# Patient Record
Sex: FEMALE | Race: WHITE | NOT HISPANIC OR LATINO | Employment: UNEMPLOYED | URBAN - NONMETROPOLITAN AREA
[De-identification: names, ages, dates, MRNs, and addresses within clinical notes are randomized per-mention and may not be internally consistent; named-entity substitution may affect disease eponyms.]

---

## 2021-02-03 ENCOUNTER — APPOINTMENT (EMERGENCY)
Dept: CT IMAGING | Facility: HOSPITAL | Age: 51
DRG: 872 | End: 2021-02-03
Payer: COMMERCIAL

## 2021-02-03 ENCOUNTER — HOSPITAL ENCOUNTER (OUTPATIENT)
Facility: HOSPITAL | Age: 51
Setting detail: OBSERVATION
Discharge: LEFT BEFORE DISCHARGE | DRG: 872 | End: 2021-02-04
Attending: EMERGENCY MEDICINE | Admitting: FAMILY MEDICINE
Payer: COMMERCIAL

## 2021-02-03 DIAGNOSIS — Z72.0 TOBACCO ABUSE: ICD-10-CM

## 2021-02-03 DIAGNOSIS — R52 INTRACTABLE PAIN: ICD-10-CM

## 2021-02-03 DIAGNOSIS — R11.0 NAUSEA: ICD-10-CM

## 2021-02-03 DIAGNOSIS — R10.9 LEFT FLANK PAIN: Primary | ICD-10-CM

## 2021-02-03 DIAGNOSIS — D72.829 LEUKOCYTOSIS: ICD-10-CM

## 2021-02-03 DIAGNOSIS — N20.0 NEPHROLITHIASIS: ICD-10-CM

## 2021-02-03 PROBLEM — R65.10 SIRS (SYSTEMIC INFLAMMATORY RESPONSE SYNDROME) (HCC): Status: ACTIVE | Noted: 2021-02-03

## 2021-02-03 PROBLEM — E87.2 INCREASED ANION GAP METABOLIC ACIDOSIS: Status: ACTIVE | Noted: 2021-02-03

## 2021-02-03 PROBLEM — E87.29 INCREASED ANION GAP METABOLIC ACIDOSIS: Status: ACTIVE | Noted: 2021-02-03

## 2021-02-03 PROBLEM — R73.9 HYPERGLYCEMIA: Status: ACTIVE | Noted: 2021-02-03

## 2021-02-03 LAB
ALBUMIN SERPL BCP-MCNC: 3.5 G/DL (ref 3.5–5)
ALP SERPL-CCNC: 116 U/L (ref 46–116)
ALT SERPL W P-5'-P-CCNC: 35 U/L (ref 12–78)
ANION GAP SERPL CALCULATED.3IONS-SCNC: 14 MMOL/L (ref 4–13)
AST SERPL W P-5'-P-CCNC: 18 U/L (ref 5–45)
BACTERIA UR QL AUTO: NORMAL /HPF
BASOPHILS # BLD AUTO: 0.08 THOUSANDS/ΜL (ref 0–0.1)
BASOPHILS NFR BLD AUTO: 0 % (ref 0–1)
BILIRUB SERPL-MCNC: 0.94 MG/DL (ref 0.2–1)
BILIRUB UR QL STRIP: NEGATIVE
BUN SERPL-MCNC: 9 MG/DL (ref 5–25)
CALCIUM SERPL-MCNC: 9 MG/DL (ref 8.3–10.1)
CHLORIDE SERPL-SCNC: 100 MMOL/L (ref 100–108)
CHOLEST SERPL-MCNC: 248 MG/DL (ref 50–200)
CK SERPL-CCNC: 58 U/L (ref 26–192)
CLARITY UR: CLEAR
CO2 SERPL-SCNC: 21 MMOL/L (ref 21–32)
COLOR UR: YELLOW
CREAT SERPL-MCNC: 0.98 MG/DL (ref 0.6–1.3)
EOSINOPHIL # BLD AUTO: 0.01 THOUSAND/ΜL (ref 0–0.61)
EOSINOPHIL NFR BLD AUTO: 0 % (ref 0–6)
ERYTHROCYTE [DISTWIDTH] IN BLOOD BY AUTOMATED COUNT: 12.6 % (ref 11.6–15.1)
EST. AVERAGE GLUCOSE BLD GHB EST-MCNC: 174 MG/DL
EXT PREG TEST URINE: NEGATIVE
EXT. CONTROL ED NAV: NORMAL
GFR SERPL CREATININE-BSD FRML MDRD: 68 ML/MIN/1.73SQ M
GLUCOSE SERPL-MCNC: 240 MG/DL (ref 65–140)
GLUCOSE SERPL-MCNC: 264 MG/DL (ref 65–140)
GLUCOSE SERPL-MCNC: 267 MG/DL (ref 65–140)
GLUCOSE UR STRIP-MCNC: ABNORMAL MG/DL
HBA1C MFR BLD: 7.7 %
HCT VFR BLD AUTO: 47.7 % (ref 34.8–46.1)
HDLC SERPL-MCNC: 34 MG/DL
HGB BLD-MCNC: 16.7 G/DL (ref 11.5–15.4)
HGB UR QL STRIP.AUTO: ABNORMAL
IMM GRANULOCYTES # BLD AUTO: 0.09 THOUSAND/UL (ref 0–0.2)
IMM GRANULOCYTES NFR BLD AUTO: 1 % (ref 0–2)
INR PPP: 0.97 (ref 0.84–1.19)
KETONES UR STRIP-MCNC: NEGATIVE MG/DL
LACTATE SERPL-SCNC: 2 MMOL/L (ref 0.5–2)
LACTATE SERPL-SCNC: 2.2 MMOL/L (ref 0.5–2)
LACTATE SERPL-SCNC: 3.1 MMOL/L (ref 0.5–2)
LDLC SERPL CALC-MCNC: 163 MG/DL (ref 0–100)
LEUKOCYTE ESTERASE UR QL STRIP: ABNORMAL
LIPASE SERPL-CCNC: 35 U/L (ref 73–393)
LYMPHOCYTES # BLD AUTO: 0.86 THOUSANDS/ΜL (ref 0.6–4.47)
LYMPHOCYTES NFR BLD AUTO: 5 % (ref 14–44)
MAGNESIUM SERPL-MCNC: 1.6 MG/DL (ref 1.6–2.6)
MCH RBC QN AUTO: 31.7 PG (ref 26.8–34.3)
MCHC RBC AUTO-ENTMCNC: 35 G/DL (ref 31.4–37.4)
MCV RBC AUTO: 91 FL (ref 82–98)
MONOCYTES # BLD AUTO: 0.51 THOUSAND/ΜL (ref 0.17–1.22)
MONOCYTES NFR BLD AUTO: 3 % (ref 4–12)
NEUTROPHILS # BLD AUTO: 16.28 THOUSANDS/ΜL (ref 1.85–7.62)
NEUTS SEG NFR BLD AUTO: 91 % (ref 43–75)
NITRITE UR QL STRIP: NEGATIVE
NON-SQ EPI CELLS URNS QL MICRO: NORMAL /HPF
NONHDLC SERPL-MCNC: 214 MG/DL
NRBC BLD AUTO-RTO: 0 /100 WBCS
PH UR STRIP.AUTO: 6 [PH]
PLATELET # BLD AUTO: 192 THOUSANDS/UL (ref 149–390)
PMV BLD AUTO: 8.8 FL (ref 8.9–12.7)
POTASSIUM SERPL-SCNC: 3.9 MMOL/L (ref 3.5–5.3)
PROT SERPL-MCNC: 8 G/DL (ref 6.4–8.2)
PROT UR STRIP-MCNC: NEGATIVE MG/DL
PROTHROMBIN TIME: 12.7 SECONDS (ref 11.6–14.5)
RBC # BLD AUTO: 5.26 MILLION/UL (ref 3.81–5.12)
RBC #/AREA URNS AUTO: NORMAL /HPF
SODIUM SERPL-SCNC: 135 MMOL/L (ref 136–145)
SP GR UR STRIP.AUTO: <=1.005 (ref 1–1.03)
TRIGL SERPL-MCNC: 253 MG/DL
UROBILINOGEN UR QL STRIP.AUTO: 0.2 E.U./DL
WBC # BLD AUTO: 17.83 THOUSAND/UL (ref 4.31–10.16)
WBC #/AREA URNS AUTO: NORMAL /HPF

## 2021-02-03 PROCEDURE — 36415 COLL VENOUS BLD VENIPUNCTURE: CPT | Performed by: EMERGENCY MEDICINE

## 2021-02-03 PROCEDURE — 85610 PROTHROMBIN TIME: CPT | Performed by: EMERGENCY MEDICINE

## 2021-02-03 PROCEDURE — 81001 URINALYSIS AUTO W/SCOPE: CPT | Performed by: EMERGENCY MEDICINE

## 2021-02-03 PROCEDURE — 82550 ASSAY OF CK (CPK): CPT | Performed by: EMERGENCY MEDICINE

## 2021-02-03 PROCEDURE — 87086 URINE CULTURE/COLONY COUNT: CPT | Performed by: NURSE PRACTITIONER

## 2021-02-03 PROCEDURE — 84145 PROCALCITONIN (PCT): CPT | Performed by: NURSE PRACTITIONER

## 2021-02-03 PROCEDURE — NC001 PR NO CHARGE

## 2021-02-03 PROCEDURE — 87077 CULTURE AEROBIC IDENTIFY: CPT | Performed by: NURSE PRACTITIONER

## 2021-02-03 PROCEDURE — 74176 CT ABD & PELVIS W/O CONTRAST: CPT

## 2021-02-03 PROCEDURE — 87186 SC STD MICRODIL/AGAR DIL: CPT | Performed by: NURSE PRACTITIONER

## 2021-02-03 PROCEDURE — 83690 ASSAY OF LIPASE: CPT | Performed by: EMERGENCY MEDICINE

## 2021-02-03 PROCEDURE — 96374 THER/PROPH/DIAG INJ IV PUSH: CPT

## 2021-02-03 PROCEDURE — 83735 ASSAY OF MAGNESIUM: CPT | Performed by: EMERGENCY MEDICINE

## 2021-02-03 PROCEDURE — 82360 CALCULUS ASSAY QUANT: CPT | Performed by: FAMILY MEDICINE

## 2021-02-03 PROCEDURE — 99285 EMERGENCY DEPT VISIT HI MDM: CPT

## 2021-02-03 PROCEDURE — G1004 CDSM NDSC: HCPCS

## 2021-02-03 PROCEDURE — 99204 OFFICE O/P NEW MOD 45 MIN: CPT

## 2021-02-03 PROCEDURE — 99220 PR INITIAL OBSERVATION CARE/DAY 70 MINUTES: CPT | Performed by: FAMILY MEDICINE

## 2021-02-03 PROCEDURE — 80053 COMPREHEN METABOLIC PANEL: CPT | Performed by: EMERGENCY MEDICINE

## 2021-02-03 PROCEDURE — 80061 LIPID PANEL: CPT | Performed by: FAMILY MEDICINE

## 2021-02-03 PROCEDURE — 96375 TX/PRO/DX INJ NEW DRUG ADDON: CPT

## 2021-02-03 PROCEDURE — 83605 ASSAY OF LACTIC ACID: CPT | Performed by: FAMILY MEDICINE

## 2021-02-03 PROCEDURE — 85025 COMPLETE CBC W/AUTO DIFF WBC: CPT | Performed by: EMERGENCY MEDICINE

## 2021-02-03 PROCEDURE — 83605 ASSAY OF LACTIC ACID: CPT | Performed by: EMERGENCY MEDICINE

## 2021-02-03 PROCEDURE — 99285 EMERGENCY DEPT VISIT HI MDM: CPT | Performed by: EMERGENCY MEDICINE

## 2021-02-03 PROCEDURE — 82948 REAGENT STRIP/BLOOD GLUCOSE: CPT

## 2021-02-03 PROCEDURE — 83036 HEMOGLOBIN GLYCOSYLATED A1C: CPT | Performed by: FAMILY MEDICINE

## 2021-02-03 PROCEDURE — 81025 URINE PREGNANCY TEST: CPT | Performed by: EMERGENCY MEDICINE

## 2021-02-03 PROCEDURE — 87040 BLOOD CULTURE FOR BACTERIA: CPT | Performed by: NURSE PRACTITIONER

## 2021-02-03 RX ORDER — ONDANSETRON 2 MG/ML
4 INJECTION INTRAMUSCULAR; INTRAVENOUS ONCE
Status: COMPLETED | OUTPATIENT
Start: 2021-02-03 | End: 2021-02-03

## 2021-02-03 RX ORDER — NICOTINE 21 MG/24HR
1 PATCH, TRANSDERMAL 24 HOURS TRANSDERMAL DAILY
Status: DISCONTINUED | OUTPATIENT
Start: 2021-02-03 | End: 2021-02-04 | Stop reason: HOSPADM

## 2021-02-03 RX ORDER — IBUPROFEN 600 MG/1
600 TABLET ORAL EVERY 6 HOURS PRN
COMMUNITY
End: 2021-04-27 | Stop reason: HOSPADM

## 2021-02-03 RX ORDER — ONDANSETRON 2 MG/ML
4 INJECTION INTRAMUSCULAR; INTRAVENOUS EVERY 6 HOURS PRN
Status: DISCONTINUED | OUTPATIENT
Start: 2021-02-03 | End: 2021-02-04 | Stop reason: HOSPADM

## 2021-02-03 RX ORDER — OXYCODONE HYDROCHLORIDE AND ACETAMINOPHEN 5; 325 MG/1; MG/1
1 TABLET ORAL EVERY 4 HOURS PRN
Status: DISCONTINUED | OUTPATIENT
Start: 2021-02-03 | End: 2021-02-04 | Stop reason: HOSPADM

## 2021-02-03 RX ORDER — MORPHINE SULFATE 4 MG/ML
4 INJECTION, SOLUTION INTRAMUSCULAR; INTRAVENOUS ONCE
Status: COMPLETED | OUTPATIENT
Start: 2021-02-03 | End: 2021-02-03

## 2021-02-03 RX ORDER — KETOROLAC TROMETHAMINE 30 MG/ML
15 INJECTION, SOLUTION INTRAMUSCULAR; INTRAVENOUS EVERY 6 HOURS PRN
Status: DISCONTINUED | OUTPATIENT
Start: 2021-02-03 | End: 2021-02-04 | Stop reason: HOSPADM

## 2021-02-03 RX ORDER — TAMSULOSIN HYDROCHLORIDE 0.4 MG/1
0.4 CAPSULE ORAL
Status: DISCONTINUED | OUTPATIENT
Start: 2021-02-04 | End: 2021-02-04 | Stop reason: HOSPADM

## 2021-02-03 RX ORDER — SODIUM CHLORIDE 9 MG/ML
125 INJECTION, SOLUTION INTRAVENOUS CONTINUOUS
Status: DISCONTINUED | OUTPATIENT
Start: 2021-02-03 | End: 2021-02-04 | Stop reason: HOSPADM

## 2021-02-03 RX ORDER — SODIUM CHLORIDE 9 MG/ML
125 INJECTION, SOLUTION INTRAVENOUS CONTINUOUS
Status: DISCONTINUED | OUTPATIENT
Start: 2021-02-03 | End: 2021-02-03

## 2021-02-03 RX ORDER — ACETAMINOPHEN 325 MG/1
650 TABLET ORAL EVERY 6 HOURS PRN
Status: DISCONTINUED | OUTPATIENT
Start: 2021-02-03 | End: 2021-02-04 | Stop reason: HOSPADM

## 2021-02-03 RX ORDER — DOCUSATE SODIUM 100 MG/1
100 CAPSULE, LIQUID FILLED ORAL 2 TIMES DAILY
Status: DISCONTINUED | OUTPATIENT
Start: 2021-02-03 | End: 2021-02-04 | Stop reason: HOSPADM

## 2021-02-03 RX ORDER — SULFAMETHOXAZOLE AND TRIMETHOPRIM 800; 160 MG/1; MG/1
1 TABLET ORAL ONCE
Status: COMPLETED | OUTPATIENT
Start: 2021-02-03 | End: 2021-02-03

## 2021-02-03 RX ORDER — KETOROLAC TROMETHAMINE 30 MG/ML
15 INJECTION, SOLUTION INTRAMUSCULAR; INTRAVENOUS ONCE
Status: COMPLETED | OUTPATIENT
Start: 2021-02-03 | End: 2021-02-03

## 2021-02-03 RX ORDER — TAMSULOSIN HYDROCHLORIDE 0.4 MG/1
0.4 CAPSULE ORAL ONCE
Status: COMPLETED | OUTPATIENT
Start: 2021-02-03 | End: 2021-02-03

## 2021-02-03 RX ORDER — SODIUM CHLORIDE 9 MG/ML
200 INJECTION, SOLUTION INTRAVENOUS CONTINUOUS
Status: DISCONTINUED | OUTPATIENT
Start: 2021-02-03 | End: 2021-02-03

## 2021-02-03 RX ORDER — CIPROFLOXACIN 2 MG/ML
400 INJECTION, SOLUTION INTRAVENOUS EVERY 12 HOURS
Status: DISCONTINUED | OUTPATIENT
Start: 2021-02-03 | End: 2021-02-04 | Stop reason: HOSPADM

## 2021-02-03 RX ADMIN — OXYCODONE HYDROCHLORIDE AND ACETAMINOPHEN 1 TABLET: 5; 325 TABLET ORAL at 17:48

## 2021-02-03 RX ADMIN — ONDANSETRON 4 MG: 2 INJECTION INTRAMUSCULAR; INTRAVENOUS at 14:40

## 2021-02-03 RX ADMIN — SULFAMETHOXAZOLE AND TRIMETHOPRIM 1 TABLET: 800; 160 TABLET ORAL at 11:06

## 2021-02-03 RX ADMIN — KETOROLAC TROMETHAMINE 15 MG: 30 INJECTION, SOLUTION INTRAMUSCULAR at 19:57

## 2021-02-03 RX ADMIN — INSULIN LISPRO 3 UNITS: 100 INJECTION, SOLUTION INTRAVENOUS; SUBCUTANEOUS at 22:38

## 2021-02-03 RX ADMIN — MORPHINE SULFATE 4 MG: 4 INJECTION INTRAVENOUS at 10:42

## 2021-02-03 RX ADMIN — DOCUSATE SODIUM 100 MG: 100 CAPSULE, LIQUID FILLED ORAL at 13:30

## 2021-02-03 RX ADMIN — SODIUM CHLORIDE 125 ML/HR: 0.9 INJECTION, SOLUTION INTRAVENOUS at 17:41

## 2021-02-03 RX ADMIN — KETOROLAC TROMETHAMINE 15 MG: 30 INJECTION, SOLUTION INTRAMUSCULAR at 11:18

## 2021-02-03 RX ADMIN — CIPROFLOXACIN 400 MG: 2 INJECTION, SOLUTION INTRAVENOUS at 13:28

## 2021-02-03 RX ADMIN — ENOXAPARIN SODIUM 40 MG: 40 INJECTION SUBCUTANEOUS at 13:30

## 2021-02-03 RX ADMIN — TAMSULOSIN HYDROCHLORIDE 0.4 MG: 0.4 CAPSULE ORAL at 11:06

## 2021-02-03 RX ADMIN — ONDANSETRON 4 MG: 2 INJECTION INTRAMUSCULAR; INTRAVENOUS at 10:40

## 2021-02-03 RX ADMIN — SODIUM CHLORIDE 125 ML/HR: 0.9 INJECTION, SOLUTION INTRAVENOUS at 11:44

## 2021-02-03 NOTE — ED PROVIDER NOTES
History  Chief Complaint   Patient presents with    Flank Pain     left flank pain that started yesterday  79-year-old female with a past medical history of kidney stones, reflux esophagitis, diaphragmatic hernia status post appendectomy presents with left flank pain associated nausea that started last night  Patient states she had a kidney stone many years ago which felt similar  Patient has taken Tylenol without improvement of symptoms  Flank pain radiates to left low back, left lower quadrant and groin  Patient states that she had urinary urgency yesterday which resolved spontaneously  Patient denies history of pyelonephritis, diverticulitis, AAA or hernia  Patient does not follow with urologist   Review of medical chart shows no recent admissions or hospitalizations  Patient denies fever, chills, headache, neck stiffness, sore throat, cough, sputum production, vomiting, chest pain, shortness of breath, rash, vaginal bleeding or discharge, focal motor or sensory deficits, lower extremity swelling or pain, diarrhea, bloody stools or constipation   is at bedside  Dr Taurus Nowak wore PPE during clinical evaluation due to COVID-19 pandemic including bonnet, eye goggles, face mask, gown and gloves            History provided by:  Patient, medical records and spouse   used: No    Flank Pain  Pain location:  L flank  Pain quality: sharp, shooting and stabbing    Pain radiates to:  L flank and LLQ  Pain severity:  Severe  Onset quality:  Sudden  Duration:  12 hours  Timing:  Constant  Progression:  Worsening  Chronicity:  New  Context: awakening from sleep    Context: not alcohol use, not diet changes, not eating, not laxative use, not medication withdrawal, not previous surgeries, not recent illness, not recent sexual activity, not recent travel, not retching, not sick contacts, not suspicious food intake and not trauma    Relieved by:  Nothing  Worsened by:  Nothing  Ineffective treatments:  Acetaminophen  Associated symptoms: nausea    Associated symptoms: no anorexia, no belching, no chest pain, no chills, no constipation, no cough, no diarrhea, no dysuria, no fatigue, no fever, no flatus, no hematemesis, no hematochezia, no hematuria, no melena, no shortness of breath, no sore throat, no vaginal bleeding, no vaginal discharge and no vomiting    Risk factors: obesity    Risk factors: no alcohol abuse, no aspirin use, not elderly, has not had multiple surgeries, no NSAID use, not pregnant and no recent hospitalization        Prior to Admission Medications   Prescriptions Last Dose Informant Patient Reported? Taking?   ibuprofen (MOTRIN) 600 mg tablet 2/3/2021 at Unknown time Self Yes Yes   Sig: Take 600 mg by mouth every 6 (six) hours as needed for mild pain or headaches      Facility-Administered Medications: None       Past Medical History:   Diagnosis Date    High cholesterol        Past Surgical History:   Procedure Laterality Date    APPENDECTOMY       SECTION      DILATION AND CURETTAGE OF UTERUS         History reviewed  No pertinent family history  I have reviewed and agree with the history as documented  E-Cigarette/Vaping    E-Cigarette Use Never User      E-Cigarette/Vaping Substances     Social History     Tobacco Use    Smoking status: Current Every Day Smoker     Packs/day: 1 00    Smokeless tobacco: Never Used   Substance Use Topics    Alcohol use: Never     Frequency: Never    Drug use: Never       Review of Systems   Constitutional: Negative for chills, diaphoresis, fatigue and fever  HENT: Negative for congestion, drooling, facial swelling, nosebleeds, sneezing, sore throat, trouble swallowing and voice change  Eyes: Negative for photophobia, pain and visual disturbance  Respiratory: Negative for cough, chest tightness, shortness of breath and wheezing  Cardiovascular: Negative for chest pain, palpitations and leg swelling  Gastrointestinal: Positive for abdominal pain and nausea  Negative for anal bleeding, anorexia, blood in stool, constipation, diarrhea, flatus, hematemesis, hematochezia, melena, rectal pain and vomiting  Genitourinary: Positive for flank pain and urgency (Resolved)  Negative for decreased urine volume, difficulty urinating, dysuria, frequency, hematuria, pelvic pain, vaginal bleeding, vaginal discharge and vaginal pain  Musculoskeletal: Negative for arthralgias, back pain, gait problem, joint swelling, myalgias, neck pain and neck stiffness  Skin: Negative for color change, pallor, rash and wound  Allergic/Immunologic: Negative for immunocompromised state  Neurological: Negative for dizziness, tremors, seizures, syncope, facial asymmetry, speech difficulty, weakness, light-headedness, numbness and headaches  Hematological: Negative for adenopathy  Psychiatric/Behavioral: Negative for agitation, confusion, hallucinations and suicidal ideas  The patient is not nervous/anxious  Physical Exam  Physical Exam  Vitals signs and nursing note reviewed  Exam conducted with a chaperone present  Constitutional:       General: She is not in acute distress  Appearance: Normal appearance  She is well-developed and normal weight  She is not ill-appearing, toxic-appearing or diaphoretic  HENT:      Head: Normocephalic and atraumatic  Jaw: There is normal jaw occlusion  Right Ear: Hearing, tympanic membrane, ear canal and external ear normal  There is no impacted cerumen  No mastoid tenderness  No hemotympanum  Left Ear: Hearing, tympanic membrane, ear canal and external ear normal  There is no impacted cerumen  No mastoid tenderness  No hemotympanum  Nose: Nose normal       Right Nostril: No epistaxis  Left Nostril: No epistaxis  Right Sinus: No maxillary sinus tenderness or frontal sinus tenderness        Left Sinus: No maxillary sinus tenderness or frontal sinus tenderness  Mouth/Throat:      Lips: Pink  No lesions  Mouth: Mucous membranes are moist  No lacerations or angioedema  Tongue: No lesions  Tongue does not deviate from midline  Palate: No mass and lesions  Pharynx: Oropharynx is clear  Uvula midline  No pharyngeal swelling, oropharyngeal exudate, posterior oropharyngeal erythema or uvula swelling  Tonsils: No tonsillar exudate or tonsillar abscesses  Eyes:      General: Lids are normal  Vision grossly intact  Gaze aligned appropriately  No visual field deficit or scleral icterus  Right eye: No discharge  Left eye: No discharge  Extraocular Movements: Extraocular movements intact  Right eye: No nystagmus  Left eye: No nystagmus  Conjunctiva/sclera: Conjunctivae normal       Right eye: Right conjunctiva is not injected  Left eye: Left conjunctiva is not injected  Pupils: Pupils are equal, round, and reactive to light  Neck:      Musculoskeletal: Full passive range of motion without pain, normal range of motion and neck supple  No neck rigidity, spinous process tenderness or muscular tenderness  Thyroid: No thyroid mass or thyromegaly  Trachea: Trachea and phonation normal    Cardiovascular:      Rate and Rhythm: Normal rate and regular rhythm  Pulses: Normal pulses  Radial pulses are 2+ on the right side and 2+ on the left side  Dorsalis pedis pulses are 2+ on the right side and 2+ on the left side  Heart sounds: Normal heart sounds, S1 normal and S2 normal    Pulmonary:      Effort: Pulmonary effort is normal  No tachypnea, accessory muscle usage, respiratory distress or retractions  Breath sounds: Normal breath sounds and air entry  No stridor or decreased air movement  No decreased breath sounds, wheezing, rhonchi or rales  Chest:      Chest wall: No tenderness  Abdominal:      General: Abdomen is flat   Bowel sounds are normal  There is no distension  Palpations: Abdomen is soft  Abdomen is not rigid  There is no mass  Tenderness: There is abdominal tenderness in the left lower quadrant  There is left CVA tenderness  There is no right CVA tenderness, guarding or rebound  Negative signs include Francis's sign and McBurney's sign  Hernia: No hernia is present  There is no hernia in the umbilical area, ventral area, left inguinal area or left femoral area  Musculoskeletal: Normal range of motion  General: No swelling, tenderness, deformity or signs of injury  Right lower leg: No edema  Left lower leg: No edema  Lymphadenopathy:      Cervical: No cervical adenopathy  Skin:     General: Skin is warm and dry  Capillary Refill: Capillary refill takes less than 2 seconds  Coloration: Skin is not ashen, cyanotic, jaundiced, mottled, pale or sallow  Findings: No abrasion, abscess, acne, bruising, burn, ecchymosis, erythema, signs of injury, laceration, lesion, petechiae, rash or wound  Rash is not macular or papular  Comments: No overlying skin discoloration, erythema or rash on left CVA, left flank or left lower quadrant  Neurological:      General: No focal deficit present  Mental Status: She is alert and oriented to person, place, and time  Mental status is at baseline  She is not disoriented  GCS: GCS eye subscore is 4  GCS verbal subscore is 5  GCS motor subscore is 6  Cranial Nerves: Cranial nerves are intact  No cranial nerve deficit, dysarthria or facial asymmetry  Sensory: Sensation is intact  No sensory deficit  Motor: Motor function is intact  No weakness, tremor, atrophy, abnormal muscle tone or seizure activity  Coordination: Coordination is intact  Coordination normal       Gait: Gait is intact   Gait normal       Comments: Patient is AAOx4, GCS 15; speaking clearly and appropriately; motor and sensation intact; visual fields intact; cranial nerves II-XII grossly intact; no facial droop, slurred speech or arm drift   Psychiatric:         Attention and Perception: Attention and perception normal  She is attentive  Mood and Affect: Mood and affect normal          Speech: Speech normal          Behavior: Behavior normal  Behavior is cooperative  Thought Content:  Thought content normal          Cognition and Memory: Cognition and memory normal          Judgment: Judgment normal          Vital Signs  ED Triage Vitals   Temperature Pulse Respirations Blood Pressure SpO2   02/03/21 0932 02/03/21 0932 02/03/21 1235 02/03/21 0932 02/03/21 0932   98 9 °F (37 2 °C) (!) 127 18 145/84 97 %      Temp Source Heart Rate Source Patient Position - Orthostatic VS BP Location FiO2 (%)   02/03/21 0932 02/03/21 0932 02/03/21 1235 02/03/21 0932 --   Temporal Monitor Lying Left arm       Pain Score       02/03/21 1042       Worst Possible Pain           Vitals:    02/04/21 0414 02/04/21 0608 02/04/21 0658 02/04/21 0826   BP:   119/55    Pulse: (!) 124 (!) 123 (!) 126 (!) 126   Patient Position - Orthostatic VS:             Visual Acuity      ED Medications  Medications   ondansetron (ZOFRAN) injection 4 mg (4 mg Intravenous Given 2/3/21 1040)   morphine (PF) 4 mg/mL injection 4 mg (4 mg Intravenous Given 2/3/21 1042)   tamsulosin (FLOMAX) capsule 0 4 mg (0 4 mg Oral Given 2/3/21 1106)   sulfamethoxazole-trimethoprim (BACTRIM DS) 800-160 mg per tablet 1 tablet (1 tablet Oral Given 2/3/21 1106)   ketorolac (TORADOL) injection 15 mg (15 mg Intravenous Given 2/3/21 1118)   ibuprofen (MOTRIN) tablet 400 mg (400 mg Oral Given 2/4/21 0454)       Diagnostic Studies  Results Reviewed     Procedure Component Value Units Date/Time    Urine culture [[id number]]  (Abnormal) Collected: 02/03/21 0959    Lab Status: Preliminary result Specimen: Urine, Clean Catch Updated: 02/05/21 0847     Urine Culture >100,000 cfu/ml Klebsiella-Enterobacter  group    Blood culture [[id number]]  (Abnormal) Collected: 02/03/21 2241    Lab Status: Preliminary result Specimen: Blood from Arm, Left Updated: 02/05/21 0159     Gram Stain Result Gram negative rods    Blood culture [[id number]]  (Abnormal) Collected: 02/03/21 2241    Lab Status: Preliminary result Specimen: Blood from Arm, Right Updated: 02/05/21 0158     Gram Stain Result Gram negative rods    Procalcitonin with AM Reflex [[id number]]  (Abnormal) Collected: 02/03/21 2241    Lab Status: Final result Specimen: Blood from Arm, Left Updated: 02/04/21 1418     Procalcitonin 7 05 ng/ml     Procalcitonin Reflex [[id number]]     Lab Status: No result Specimen: Blood     Fingerstick Glucose (POCT) [[id number]]  (Abnormal) Collected: 02/04/21 0700    Lab Status: Final result Updated: 02/04/21 0702     POC Glucose 191 mg/dl     Comprehensive metabolic panel [[id number]]  (Abnormal) Collected: 02/04/21 0457    Lab Status: Final result Specimen: Blood from Arm, Right Updated: 02/04/21 0525     Sodium 136 mmol/L      Potassium 3 6 mmol/L      Chloride 102 mmol/L      CO2 22 mmol/L      ANION GAP 12 mmol/L      BUN 9 mg/dL      Creatinine 0 91 mg/dL      Glucose 221 mg/dL      Glucose, Fasting 221 mg/dL      Calcium 8 4 mg/dL      Corrected Calcium 9 5 mg/dL      AST 14 U/L      ALT 25 U/L      Alkaline Phosphatase 86 U/L      Total Protein 6 6 g/dL      Albumin 2 6 g/dL      Total Bilirubin 0 87 mg/dL      eGFR 75 ml/min/1 73sq m     Narrative:      [place] guidelines for Chronic Kidney Disease (CKD):     Stage 1 with normal or high GFR (GFR > 90 mL/min/1 73 square meters)    Stage 2 Mild CKD (GFR = 60-89 mL/min/1 73 square meters)    Stage 3A Moderate CKD (GFR = 45-59 mL/min/1 73 square meters)    Stage 3B Moderate CKD (GFR = 30-44 mL/min/1 73 square meters)    Stage 4 Severe CKD (GFR = 15-29 mL/min/1 73 square meters)    Stage 5 End Stage CKD (GFR <15 mL/min/1 73 square meters)  Note: GFR calculation is accurate only with a steady state creatinine    CBC and differential [[id number]]  (Abnormal) Collected: 02/04/21 0457    Lab Status: Final result Specimen: Blood from Arm, Right Updated: 02/04/21 0515     WBC 10 30 Thousand/uL      RBC 4 43 Million/uL      Hemoglobin 13 8 g/dL      Hematocrit 40 9 %      MCV 92 fL      MCH 31 2 pg      MCHC 33 7 g/dL      RDW 12 9 %      MPV 9 0 fL      Platelets 994 Thousands/uL      nRBC 0 /100 WBCs      Neutrophils Relative 89 %      Immat GRANS % 0 %      Lymphocytes Relative 4 %      Monocytes Relative 7 %      Eosinophils Relative 0 %      Basophils Relative 0 %      Neutrophils Absolute 9 17 Thousands/µL      Immature Grans Absolute 0 04 Thousand/uL      Lymphocytes Absolute 0 38 Thousands/µL      Monocytes Absolute 0 68 Thousand/µL      Eosinophils Absolute 0 02 Thousand/µL      Basophils Absolute 0 01 Thousands/µL     Stone analysis [[id number]] Collected: 02/03/21 2121    Lab Status: In process Specimen: Other from Calculi Updated: 02/03/21 2126    Fingerstick Glucose (POCT) [[id number]]  (Abnormal) Collected: 02/03/21 2059    Lab Status: Final result Updated: 02/03/21 2105     POC Glucose 267 mg/dl     Lactic acid, plasma [[id number]]  (Normal) Collected: 02/03/21 1702    Lab Status: Final result Specimen: Blood from Arm, Right Updated: 02/03/21 1723     LACTIC ACID 2 0 mmol/L     Narrative:      Result may be elevated if tourniquet was used during collection      Hemoglobin A1C w/ EAG Estimation [[id number]]  (Abnormal) Collected: 02/03/21 1032    Lab Status: Final result Specimen: Blood from Arm, Left Updated: 02/03/21 1656     Hemoglobin A1C 7 7 %       mg/dl     Fingerstick Glucose (POCT) [[id number]]  (Abnormal) Collected: 02/03/21 1521    Lab Status: Final result Updated: 02/03/21 1544     POC Glucose 240 mg/dl     Lactic acid 2 Hours [[id number]]  (Abnormal) Collected: 02/03/21 1317    Lab Status: Final result Specimen: Blood from Arm, Left Updated: 02/03/21 1344     LACTIC ACID 2 2 mmol/L Narrative:      Result may be elevated if tourniquet was used during collection  Lipid panel [[id number]]  (Abnormal) Collected: 02/03/21 1032    Lab Status: Final result Specimen: Blood from Arm, Left Updated: 02/03/21 1323     Cholesterol 248 mg/dL      Triglycerides 253 mg/dL      HDL, Direct 34 mg/dL      LDL Calculated 163 mg/dL      Non-HDL-Chol (CHOL-HDL) 214 mg/dl     CBC and differential [[id number]]  (Abnormal) Collected: 02/03/21 1032    Lab Status: Final result Specimen: Blood from Arm, Left Updated: 02/03/21 1100     WBC 17 83 Thousand/uL      RBC 5 26 Million/uL      Hemoglobin 16 7 g/dL      Hematocrit 47 7 %      MCV 91 fL      MCH 31 7 pg      MCHC 35 0 g/dL      RDW 12 6 %      MPV 8 8 fL      Platelets 111 Thousands/uL      nRBC 0 /100 WBCs      Neutrophils Relative 91 %      Immat GRANS % 1 %      Lymphocytes Relative 5 %      Monocytes Relative 3 %      Eosinophils Relative 0 %      Basophils Relative 0 %      Neutrophils Absolute 16 28 Thousands/µL      Immature Grans Absolute 0 09 Thousand/uL      Lymphocytes Absolute 0 86 Thousands/µL      Monocytes Absolute 0 51 Thousand/µL      Eosinophils Absolute 0 01 Thousand/µL      Basophils Absolute 0 08 Thousands/µL     Narrative: This is an appended report  These results have been appended to a previously verified report  Lactic acid, plasma [[id number]]  (Abnormal) Collected: 02/03/21 1032    Lab Status: Final result Specimen: Blood from Arm, Left Updated: 02/03/21 1059     LACTIC ACID 3 1 mmol/L     Narrative:      Result may be elevated if tourniquet was used during collection      CK (with reflex to MB) [[id number]]  (Normal) Collected: 02/03/21 1032    Lab Status: Final result Specimen: Blood from Arm, Left Updated: 02/03/21 1054     Total CK 58 U/L     CMP [[id number]]  (Abnormal) Collected: 02/03/21 1032    Lab Status: Final result Specimen: Blood from Arm, Left Updated: 02/03/21 1052     Sodium 135 mmol/L      Potassium 3 9 mmol/L Chloride 100 mmol/L      CO2 21 mmol/L      ANION GAP 14 mmol/L      BUN 9 mg/dL      Creatinine 0 98 mg/dL      Glucose 264 mg/dL      Calcium 9 0 mg/dL      AST 18 U/L      ALT 35 U/L      Alkaline Phosphatase 116 U/L      Total Protein 8 0 g/dL      Albumin 3 5 g/dL      Total Bilirubin 0 94 mg/dL      eGFR 68 ml/min/1 73sq m     Narrative:      [place] guidelines for Chronic Kidney Disease (CKD):     Stage 1 with normal or high GFR (GFR > 90 mL/min/1 73 square meters)    Stage 2 Mild CKD (GFR = 60-89 mL/min/1 73 square meters)    Stage 3A Moderate CKD (GFR = 45-59 mL/min/1 73 square meters)    Stage 3B Moderate CKD (GFR = 30-44 mL/min/1 73 square meters)    Stage 4 Severe CKD (GFR = 15-29 mL/min/1 73 square meters)    Stage 5 End Stage CKD (GFR <15 mL/min/1 73 square meters)  Note: GFR calculation is accurate only with a steady state creatinine    Lipase [[id number]]  (Abnormal) Collected: 02/03/21 1032    Lab Status: Final result Specimen: Blood from Arm, Left Updated: 02/03/21 1052     Lipase 35 u/L     Magnesium [[id number]]  (Normal) Collected: 02/03/21 1032    Lab Status: Final result Specimen: Blood from Arm, Left Updated: 02/03/21 1052     Magnesium 1 6 mg/dL     Protime-INR [[id number]]  (Normal) Collected: 02/03/21 1032    Lab Status: Final result Specimen: Blood from Arm, Left Updated: 02/03/21 1048     Protime 12 7 seconds      INR 0 97    Urine Microscopic [[id number]]  (Normal) Collected: 02/03/21 0959    Lab Status: Final result Specimen: Urine, Clean Catch Updated: 02/03/21 1037     RBC, UA None Seen /hpf      WBC, UA 0-5 /hpf      Epithelial Cells Occasional /hpf      Bacteria, UA Occasional /hpf     UA w Reflex to Microscopic w Reflex to Culture [[id number]]  (Abnormal) Collected: 02/03/21 0959    Lab Status: Final result Specimen: Urine, Clean Catch Updated: 02/03/21 1006     Color, UA Yellow     Clarity, UA Clear     Specific Gravity, UA <=1 005     pH, UA 6 0 Leukocytes, UA Elevated glucose may cause decreased leukocyte values  See urine microscopic for [facility] result/     Nitrite, UA Negative     Protein, UA Negative mg/dl      Glucose, UA >=1000 (1%) mg/dl      Ketones, UA Negative mg/dl      Urobilinogen, UA 0 2 E U /dl      Bilirubin, UA Negative     Blood, UA Small    POCT pregnancy, urine [[id number]]  (Normal) Resulted: 02/03/21 1000    Lab Status: Final result Updated: 02/03/21 1000     EXT PREG TEST UR (Ref: Negative) negative     Control Valid                 CT renal stone study abdomen pelvis without contrast   Final Result by Myra Tate DO (02/03 1017)      Mild left hydronephrosis and perinephric stranding secondary to obstructing 0 4 cm stone in the left mid ureter  The study was marked in [facility] for immediate notification  Workstation performed: [id number]                    Procedures  Procedures         ED Course  ED Course as of Feb 05 1310   Wed Feb 03, 2021   1045 CT renal:IMPRESSION:     Mild left hydronephrosis and perinephric stranding secondary to obstructing 0 4 cm stone in the left mid ureter         The study was marked in [facility] for immediate notification  [address] [place] [address], Urology PA-C regarding patient's labs and CT findings  Reassess patient  She states her left flank pain improved for a while at with morphine 4 mg IV but is returning again  200 Dr Naren Antunez, Urology recommends observation status at [address] [place] [address] and patient to receive Flomax, hydration, pain control and repeat labs  If vitals worsen, then patient will need to be transferred to [facility] for stent placement  Texted Dr Libby Arce, hospitalist for [place] observation status  1 Dr Libby Arce accepts patient to [place] [address] observation status for left-sided nephrolithiasis  Updated patient                MDM  Number of Diagnoses or Management Options  Left flank pain:   Leukocytosis:   Nausea:   Nephrolithiasis:      Amount and/or Complexity of Data Reviewed  Clinical lab tests: reviewed and ordered  Tests in the radiology section of CPT®: reviewed and ordered  Tests in the medicine section of CPT®: ordered and reviewed  Review and summarize past medical records: yes  Discuss the patient with other providers: yes (Urology, [facility], Dr Justa Archer)  Independent visualization of images, tracings, or specimens: yes (CT renal)    Risk of Complications, Morbidity, and/or Mortality  Presenting problems: moderate  Diagnostic procedures: moderate  Management options: moderate    Patient Progress  Patient progress: stable      Disposition  Final diagnoses:   Left flank pain   Nephrolithiasis   Leukocytosis   Nausea     Time reflects when diagnosis was documented in both MDM as applicable and the Disposition within this note     Time User Action Codes Description Comment    2/3/2021 11:00 AM Jaskaran Nicks Add [R10 9] Left flank pain     2/3/2021 11:00 AM Jaskaran Nicks Add [N20 0] Nephrolithiasis     2/3/2021 11:00 AM Jaskaran Nicks Add [C01 778] Leukocytosis     2/3/2021 11:00 AM Jaskaran Nicks Add [R11 0] Nausea     2/4/2021  9:58 AM Spiritism, Brenda Add [R52] Intractable pain     2/4/2021  9:58 AM Spiritism, Brenda Add [Z72 0] Tobacco abuse       ED Disposition     ED Disposition Condition Date/Time Comment    Admit Stable Wed Feb 3, 2021 11:26 AM Case was discussed with Dr Justa Archer and the patient's admission status was agreed to be Admission Status: observation status to the service of Dr Justa Chacon     Follow up With Specialties Details Why Contact Info Additional Information    [address] [facility] Family Medicine Call in 1 day(s) Please call the doctor's office in 1-2 days to make an appointment and 1 week for hospital discharge follow-up Zuleika Marlow [address] [facility], [address], [place], [place], [facility]    Xochilt Rollins MD Urology Call in 1 day(s) Please call the doctor's office in 1-2 days to make an in 3-5 days [address] [facility] [address]  [phone]             Discharge Medication List as of 2/4/2021 10:07 AM      START taking these medications    Details   levofloxacin (LEVAQUIN) 750 mg tablet Take 1 tablet (750 mg total) by mouth every 24 hours for 5 days, Starting Thu 2/4/2021, Until Tue 2/9/2021, Normal      nicotine (NICODERM CQ) 21 mg/24 hr TD 24 hr patch Place 1 patch on the skin daily, Starting Fri 2/5/2021, Normal      ondansetron (ZOFRAN) 4 mg tablet Take 1 tablet (4 mg total) by mouth every 8 (eight) hours as needed for nausea or vomiting, Starting Thu 2/4/2021, Normal      oxyCODONE-acetaminophen (PERCOCET) 5-325 mg per tablet Take 1 tablet by mouth every 4 (four) hours as needed for moderate painMax Daily Amount: 6 tablets, Starting Thu 2/4/2021, Normal      tamsulosin (FLOMAX) 0 4 mg Take 1 capsule (0 4 mg total) by mouth daily with dinner for 15 days, Starting Thu 2/4/2021, Until Fri 2/19/2021, Normal         CONTINUE these medications which have NOT CHANGED    Details   ibuprofen (MOTRIN) 600 mg tablet Take 600 mg by mouth every 6 (six) hours as needed for mild pain or headaches, Historical [facility]           Outpatient Discharge Orders   Ambulatory referral to Urology   Standing Status: Future Standing Exp   Date: 02/04/22      Discharge Diet     Activity as tolerated     Call provider for:  persistent nausea or vomiting     Call provider for:  severe uncontrolled pain     Call provider for:  redness, tenderness, or signs of infection (pain, swelling, redness, odor or green/yellow discharge around incision site)     Call provider for: active or persistent bleeding     Call provider for:  difficulty breathing, headache or visual disturbances     Call provider for:  hives     Call provider for:  persistent dizziness or light-headedness     Call provider for:  extreme fatigue       PDMP Review     None          ED Provider  Electronically Signed by    MD Kenn Diaz MD  02/05/21 1788

## 2021-02-03 NOTE — ASSESSMENT & PLAN NOTE
As per patient, she does not have any diabetes  Blood blood sugar is 264  Follow fingerstick blood glucose, follow A1c level, follow lipid panel  If blood sugar remains elevated, consider sliding scale insulin

## 2021-02-03 NOTE — ASSESSMENT & PLAN NOTE
Most nephrolithiasis,  CT scan renal protocol patient does 0 4 cm left-sided stone with mild hydronephrosis and straining  Does have elevated WBC lactic acidosis  Continue IV hydration  Continue pain management  Patient already received Bactrim in ER, will continue IV ciprofloxacin  Monitor labs  Urology consult appreciated

## 2021-02-03 NOTE — PLAN OF CARE
Problem: Potential for Falls  Goal: Patient will remain free of falls  Description: INTERVENTIONS:  - Assess patient frequently for physical needs  -  Identify cognitive and physical deficits and behaviors that affect risk of falls    -  [place] fall precautions as indicated by assessment   - Educate patient/family on patient safety including physical limitations  - Instruct patient to call for assistance with activity based on assessment  - Modify environment to reduce risk of injury  - Consider OT/PT consult to assist with strengthening/mobility  Outcome: Progressing     Problem: PAIN - ADULT  Goal: Verbalizes/displays adequate comfort level or baseline comfort level  Description: Interventions:  - Encourage patient to monitor pain and request assistance  - Assess pain using appropriate pain scale  - Administer analgesics based on type and severity of pain and evaluate response  - Implement non-pharmacological measures as appropriate and evaluate response  - Consider cultural and social influences on pain and pain management  - Notify physician/advanced practitioner if interventions unsuccessful or patient reports new pain  Outcome: Progressing     Problem: INFECTION - ADULT  Goal: Absence or prevention of progression during hospitalization  Description: INTERVENTIONS:  - Assess and monitor for signs and symptoms of infection  - Monitor lab/diagnostic results  - Monitor all insertion sites, i e  indwelling lines, tubes, and drains  - Monitor endotracheal if appropriate and nasal secretions for changes in amount and color  - [place] appropriate cooling/warming therapies per order  - Administer medications as ordered  - Instruct and encourage patient and family to use good hand hygiene technique  - Identify and instruct in appropriate isolation precautions for identified infection/condition  Outcome: Progressing  Goal: Absence of fever/infection during neutropenic period  Description: INTERVENTIONS:  - Monitor WBC    Outcome: Progressing     Problem: SAFETY ADULT  Goal: Patient will remain free of falls  Description: INTERVENTIONS:  - Assess patient frequently for physical needs  -  Identify cognitive and physical deficits and behaviors that affect risk of falls    -  [place] fall precautions as indicated by assessment   - Educate patient/family on patient safety including physical limitations  - Instruct patient to call for assistance with activity based on assessment  - Modify environment to reduce risk of injury  - Consider OT/PT consult to assist with strengthening/mobility  Outcome: Progressing  Goal: Maintain or return to baseline ADL function  Description: INTERVENTIONS:  -  Assess patient's ability to carry out ADLs; assess patient's baseline for ADL function and identify physical deficits which impact ability to perform ADLs (bathing, care of mouth/teeth, toileting, grooming, dressing, etc )  - Assess/evaluate cause of self-care deficits   - Assess range of motion  - Assess patient's mobility; develop plan if impaired  - Assess patient's need for assistive devices and provide as appropriate  - Encourage maximum independence but intervene and supervise when necessary  - Involve family in performance of ADLs  - Assess for home care needs following discharge   - Consider OT consult to assist with ADL evaluation and planning for discharge  - Provide patient education as appropriate  Outcome: Progressing  Goal: Maintain or return mobility status to optimal level  Description: INTERVENTIONS:  - Assess patient's baseline mobility status (ambulation, transfers, stairs, etc )    - Identify cognitive and physical deficits and behaviors that affect mobility  - Identify mobility aids required to assist with transfers and/or ambulation (gait belt, sit-to-stand, lift, walker, cane, etc )  - [place] fall precautions as indicated by assessment  - Record patient progress and toleration of activity level on Mobility SBAR; progress patient to next Phase/Stage  - Instruct patient to call for assistance with activity based on assessment  - Consider rehabilitation consult to assist with strengthening/weightbearing, etc   Outcome: Progressing     Problem: DISCHARGE PLANNING  Goal: Discharge to home or other facility with appropriate resources  Description: INTERVENTIONS:  - Identify barriers to discharge w/patient and caregiver  - Arrange for needed discharge resources and transportation as appropriate  - Identify discharge learning needs (meds, wound care, etc )  - Arrange for interpretive services to assist at discharge as needed  - Refer to Case Management Department for coordinating discharge planning if the patient needs post-hospital services based on physician/advanced practitioner order or complex needs related to functional status, cognitive ability, or social support system  Outcome: Progressing     Problem: Knowledge Deficit  Goal: Patient/family/caregiver demonstrates understanding of disease process, treatment plan, medications, and discharge instructions  Description: Complete learning assessment and assess knowledge base    Interventions:  - Provide teaching at level of understanding  - Provide teaching via preferred learning methods  Outcome: Progressing

## 2021-02-03 NOTE — CONSULTS
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 in some very which was not amenable to initial cystoscopy and stone extraction, she required laser lithotripsy a week later with subsequent stone passage  Reflux esophagitis in past    Morbid obesity, BMI 39 86  Plan:  Conservative non operative urological management at this time, facilitate stone passage  Discussed with the attending hospitalist physician  Clear liquids  Nothing by mouth after midnight  Start IV ciprofloxacin  Strain urine  Flomax 0 4 mg daily to facilitate stone passage  Continue to observe, right now no plan for immediate interventional urological procedure  IV fluids for hydration, normal saline at 200 mL per hour for the next 3 hours, repeat serum lactate and adjust fluids as needed  Repeat CBC, BMP in a m  to evaluate kidney function  Analgesics, antiemetics as ordered  If the patient clinically worsens with increased pain, worsening kidney function or lab studies, or fever develops then the patient will need tertiary transfer to urology service in either [facility] or [facility] for further definitive interventional urology care  History of Present Illness     HPI:  Oksana Boykin is a 52 y o  female who presents with previous history of kidney stones 8 or 9 years ago  She started with severe pain in her left flank yesterday afternoon  She was seen emergency department here today with 10/10 pain  She had nausea  No vomiting  She denies any fever  Discomfort radiated from the left lower abdomen to the low back and groin area  She had some urinary urgency yesterday which resolved spontaneously  She had noticed small amount of blood in her urine  She has a history of kidney stones in some very 8 or 9 years ago  She had a stone on the right side which was very jagged and not able to be retrieved upon initial cystoscopy ureteroscopy by the urologist there  The patient was brought back a week later for laser lithotripsy and subsequent stone passage  The patient now is comfortable after being given IV narcotics for pain  She has been started on aggressive IV fluids, she was noted to have lactic acidosis of 3 1 on admission  Also white count elevated at over 17,000  CT scan of the abdomen pelvis done without IV contrast showed a left ureteral obstructing 4 mm stone with mild hydronephrosis noted  Urological consultation requested at this time  The attending ED physician had contacted the on-call urology PA for the service in [facility] and the CT scan was reported to this provider as reviewed by Dr Marisol Plata   Patient is now admitted under observation care to Medicine to see if she can pass the stone spontaneously and treat with IV antibiotics      Inpatient consult to Urology  Consult performed by: Demond Cornejo PA-C  Consult ordered by: Kellee Lua MD        Review of Systems     Review of Systems   Constitutional: Negative for chills, diaphoresis, fatigue and fever  HEENT: Negative for congestion, drooling, facial swelling, nosebleeds, sneezing, sore throat, trouble swallowing and voice change  Eyes: Negative for photophobia, pain and visual disturbance  Respiratory: Negative for cough, chest tightness, shortness of breath and wheezing  Cardiovascular: Negative for chest pain, palpitations and leg swelling  Gastrointestinal:  Continuous left flank discomfort radiating to the back and left groin    Genitourinary:  As described above in the HPI with urinary frequency and urgency, hematuria  Musculoskeletal: Negative for arthralgias, back pain, gait problem, joint swelling, myalgias, neck pain and neck stiffness  Skin: Negative for color change, pallor, rash and wound  Allergic/Immunologic: Negative for immunocompromised state  Neurological: Negative for dizziness, tremors, seizures, syncope, facial asymmetry, speech difficulty, weakness, light-headedness, numbness and headaches  Hematological: Negative for adenopathy  Psychiatric/Behavioral: Negative for agitation, confusion, hallucinations and suicidal ideas  The patient is not nervous/anxious  OBGYN history,  2, para 1, AB1  Last menstrual period the 1st week in January, she is due any day for her menses      Historical Information   Past Medical History:   Diagnosis Date    High cholesterol      Past Surgical History:   Procedure Laterality Date    APPENDECTOMY       SECTION      DILATION AND CURETTAGE OF UTERUS       Social History   Social History     Substance and Sexual Activity   Alcohol Use Never    Frequency: Never     Social History     Substance and Sexual Activity   Drug Use Never     E-Cigarette/Vaping    E-Cigarette Use Never User      E-Cigarette/Vaping Substances     Social History     Tobacco Use   Smoking Status Current Every Day Smoker    Packs/day: 1 00   Smokeless Tobacco Never Used     Family History: non-contributory    Meds/Allergies   current meds:   Current Facility-Administered Medications   Medication Dose Route Frequency    sodium chloride 0 9 % infusion  125 mL/hr Intravenous Continuous     Allergies   Allergen Reactions    Amoxicillin GI Intolerance    Lipitor [Atorvastatin] Chest Pain       Objective   First Vitals:   Blood Pressure: 145/84 (21)  Pulse: (!) 127 (21)  Temperature: 98 9 °F (37 2 °C) (21)  Temp Source: Temporal (21)  Height: 5' 3" (160 cm) (21 0932)  Weight - Scale: 102 kg (225 lb) (02/03/21 0932)  SpO2: 97 % (02/03/21 0932)    Current Vitals:   Blood Pressure: 145/84 (02/03/21 0932)  Pulse: (!) 127 (02/03/21 0932)  Temperature: 98 9 °F (37 2 °C) (02/03/21 0932)  Temp Source: Temporal (02/03/21 0932)  Height: 5' 3" (160 cm) (02/03/21 0932)  Weight - Scale: 102 kg (225 lb) (02/03/21 0932)  SpO2: 97 % (02/03/21 0932)    No intake or output data in the 24 hours ending 02/03/21 1146    Invasive Devices     Peripheral Intravenous Line            Peripheral IV 02/03/21 Left;Medial Forearm less than 1 day              Patient examined in the emergency department  Her  is present with her  Nontoxic-appearing  No acute distress  Speech is clear  She stated her pain was now a 1 out of a 10 point scale  Speech is clear  Skin warm dry to touch  ENT clear  Chest symmetric  Heart tachycardic  No murmur gallop  Regular rate and rhythm  Lungs clear to auscultation  Back left flank tenderness to percussion  Abdomen obese  Positive bowel sounds heard  Abdomen is soft  Mild tenderness left lower quadrant without guarding or rebound  No masses  No abdominal wall hernias  Moves all 4 extremities well  No calf tenderness or peripheral edema  Neurological exam shows no focal motor sensory neurologic weakness  Mental status appropriate  No tremor  Cranial nerves appear symmetrical   Ambulation not observed  Lab Results:   I have personally reviewed pertinent lab results    , CBC:   Lab Results   Component Value Date    WBC 17 83 (H) 02/03/2021    HGB 16 7 (H) 02/03/2021    HCT 47 7 (H) 02/03/2021    MCV 91 02/03/2021     02/03/2021    MCH 31 7 02/03/2021    MCHC 35 0 02/03/2021    RDW 12 6 02/03/2021    MPV 8 8 (L) 02/03/2021    NRBC 0 02/03/2021   , CMP:   Lab Results   Component Value Date    SODIUM 135 (L) 02/03/2021    K 3 9 02/03/2021     02/03/2021    CO2 21 02/03/2021    BUN 9 02/03/2021    CREATININE 0 98 02/03/2021 CALCIUM 9 0 02/03/2021    AST 18 02/03/2021    ALT 35 02/03/2021    ALKPHOS 116 02/03/2021    EGFR 68 02/03/2021   , Coagulation:   Lab Results   Component Value Date    INR 0 97 02/03/2021   , Urinalysis:   Lab Results   Component Value Date    COLORU Yellow 02/03/2021    CLARITYU Clear 02/03/2021    SPECGRAV <=1 005 02/03/2021    PHUR 6 0 02/03/2021    LEUKOCYTESUR (A) 02/03/2021     Elevated glucose may cause decreased leukocyte values  See urine microscopic for [facility] result/    NITRITE Negative 02/03/2021    GLUCOSEU >=1000 (1%) (A) 02/03/2021    KETONESU Negative 02/03/2021    BILIRUBINUR Negative 02/03/2021    BLOODU Small (A) 02/03/2021     POCT pregnancy, urine  Order: [id number]  Status:  Final result   Visible to patient:  No (inaccessible in [address] [facility])   Next appt:  None  Component 2/3/21 1000   EXT PREG TEST UR (Ref: Negative) negative    Control Valid          Last Resulted: 02/03/21 10:00           Lactic acid, plasma  Order: [id number]  Status:  Final result   Visible to patient:  No (inaccessible in [facility])   Next appt:  None   Ref Range & Units 2/3/21 1032   LACTIC ACID 0 5 - 2 0 mmol/L 3 1High Panic             Imaging: I have personally reviewed pertinent films in PACS     CT ABDOMEN AND PELVIS WITHOUT IV CONTRAST - LOW DOSE RENAL STONE      INDICATION:   Flank pain, kidney stone suspected  leftf lank pain      COMPARISON:  None      TECHNIQUE:  Low dose thin section CT examination of the abdomen and pelvis was performed without intravenous or oral contrast according to a protocol specifically designed to evaluate for urinary tract calculus  Axial, sagittal, and coronal 2D   reformatted images were created from the source data and submitted for interpretation  Evaluation for pathology in the abdomen and pelvis that is unrelated to urinary tract calculi is limited       Radiation dose length product (DLP) for this visit:  737 mGy-cm     This examination, like all CT scans performed in the [facility], was performed utilizing techniques to minimize radiation dose exposure, including the use of iterative   reconstruction and automated exposure control       FINDINGS:     RIGHT KIDNEY AND URETER:  No urinary tract calculi  No hydronephrosis or hydroureter      LEFT KIDNEY AND URETER:  Mild left hydronephrosis and perinephric stranding secondary to a 0 4 cm obstructing stone in the left mid ureter      URINARY BLADDER:   Unremarkable      No significant abnormality in the visualized lung bases      Limited low radiation dose noncontrast CT evaluation demonstrates no clinically significant abnormality of liver, spleen, pancreas, or adrenal glands  No calcified gallstones or gallbladder wall thickening noted  No ascites or bulky lymphadenopathy on this limited noncontrast study  Bowel loops appear unremarkable  Limited evaluation demonstrates no evidence to suggest acute appendicitis  There are multilevel degenerative changes of the spine  No acute fracture or destructive osseous lesion      IMPRESSION:     Mild left hydronephrosis and perinephric stranding secondary to obstructing 0 4 cm stone in the left mid ureter         The study was marked in EPIC for immediate notification         EKG, Pathology, and Other Studies: I have personally reviewed pertinent reports  Counseling / Coordination of Care  Total floor / unit time spent today 40 minutes  Greater than 50% of total time was spent with the patient and / or family counseling and / or coordination of care    A description of the counseling / coordination of care:  Review of diagnostic imaging, laboratory studies, tiger text messaging with the on-call covering urologist Dr Antonio Mansfield, personal examination of the patient by this provider, review of existing orders, and discussion with the attending hospitalist provider all conducted by myself in the urological evaluation of the patient at this time     Nicci Sousa PA-C

## 2021-02-03 NOTE — ASSESSMENT & PLAN NOTE
Secondary to renal stone  Patient is tachycardic, elevated WBC,  Continue IV ciprofloxacin, new IV hydration  Urine shows small blood

## 2021-02-03 NOTE — ASSESSMENT & PLAN NOTE
Most likely secondary lactic acidosis secondary to nephrolithiasis  Continue IV hydration, IV antibiotic,  Monitor labs

## 2021-02-03 NOTE — H&P
H&P- Dale Mckeon 1970, 52 y o  female MRN: [id number]    Unit/Bed#: ED 05 Encounter: [id number]    Primary Care Provider: BRETT Grimes   Date and time admitted to hospital: 2/3/2021  9:27 AM        SIRS (systemic inflammatory response syndrome) ([facility] [address] )  Assessment & Plan  Secondary to renal stone  Patient is tachycardic, elevated WBC,  Continue IV ciprofloxacin, new IV hydration  Urine shows small blood    * Intractable pain  Assessment & Plan  Most nephrolithiasis,  CT scan renal protocol patient does 0 4 cm left-sided stone with mild hydronephrosis and straining  Does have elevated WBC lactic acidosis  Continue IV hydration  Continue pain management  Patient already received Bactrim in ER, will continue IV ciprofloxacin  Monitor labs  Urology consult appreciated    Nephrolithiasis  Assessment & Plan  As per CT scan renal protocol shows:Mild left hydronephrosis and perinephric stranding secondary to obstructing 0 4 cm stone in the left mid ureter  Patient does have previous history of renal stone  Continue pain management, IV hydration, Flomax, IV antibiotic  Urology consult appreciated    If condition get worse, or any signs symptoms of abscess, patient is to be transfer to [facility] or [place] for IR guided procedure    Tobacco abuse  Assessment & Plan  Patient smokes 1 pack per day for last 29 years  Continue Nicoderm patch    Hyperglycemia  Assessment & Plan  As per patient, she does not have any diabetes  Blood blood sugar is 264  Follow fingerstick blood glucose, follow A1c level, follow lipid panel  If blood sugar remains elevated, consider sliding scale insulin    Increased anion gap metabolic acidosis  Assessment & Plan  Most likely secondary lactic acidosis secondary to nephrolithiasis  Continue IV hydration, IV antibiotic,  Monitor labs      VTE Prophylaxis: Enoxaparin (Lovenox)  / sequential compression device   Code Status:  Full code    Discussion with family:  Family member on the bedside    Anticipated Length of Stay:  Patient will be admitted on an Observation basis with an anticipated length of stay of  < 2 midnights  Justification for Hospital Stay:  To monitor above condition    Total Time for Visit, including Counseling / Coordination of Care: 45 minutes  Greater than 50% of this total time spent on direct patient counseling and coordination of care  Chief Complaint:    flank pain    History of Present Illness:    Kolby Trujillo is a 52 y o  female who presents with left flank pain, for last 1 days, started suddenly, sharp in nature, radiates towards her lower back and left groin area  Associated with nausea and dry heaves  Denies any fever, diarrhea, abdominal pain and distension, rash, any history of trauma in the back  Patient smokes cigarettes 1 pack per day for last 29 years  Patient also had history of right-sided kidney stone few back  Denies any problem with urination       Review of Systems:    Review of Systems   Constitutional: Positive for activity change  Negative for appetite change, chills, diaphoresis, fatigue, fever and unexpected weight change  HENT: Negative for sneezing, sore throat, tinnitus and trouble swallowing  Respiratory: Negative for apnea, shortness of breath, wheezing and stridor  Cardiovascular: Negative for chest pain, palpitations and leg swelling  Gastrointestinal: Positive for nausea  Negative for abdominal distention, abdominal pain, constipation, rectal pain and vomiting  Genitourinary: Positive for flank pain  Negative for decreased urine volume, difficulty urinating, dysuria, hematuria, pelvic pain, urgency, vaginal bleeding, vaginal discharge and vaginal pain  Musculoskeletal: Negative for arthralgias, back pain and gait problem  Skin: Negative for color change, pallor and wound  Neurological: Negative for dizziness, tremors, syncope, facial asymmetry and light-headedness     Hematological: Negative for adenopathy  Psychiatric/Behavioral: Negative for agitation, behavioral problems and confusion  All other systems reviewed and are negative  Past Medical and Surgical History:     Past Medical History:   Diagnosis Date    High cholesterol        Past Surgical History:   Procedure Laterality Date    APPENDECTOMY       SECTION      DILATION AND CURETTAGE OF UTERUS         Meds/Allergies:    Prior to Admission medications    Medication Sig Start Date End Date Taking? Authorizing Provider   ibuprofen (MOTRIN) 600 mg tablet Take 600 mg by mouth every 6 (six) hours as needed for mild pain or headaches   Yes Historical Provider, MD     I have reviewed home medications with patient personally  Allergies: Allergies   Allergen Reactions    Amoxicillin GI Intolerance    Lipitor [Atorvastatin] Chest Pain       Social History:     Marital Status: /Civil Union   Occupation:  Unknown  Patient Pre-hospital Living Situation:  At home  Patient Pre-hospital Level of Mobility:  Independent  Patient Pre-hospital Diet Restrictions:  No restriction  Substance Use History:   Social History     Substance and Sexual Activity   Alcohol Use Never    Frequency: Never     Social History     Tobacco Use   Smoking Status Current Every Day Smoker    Packs/day: 1 00   Smokeless Tobacco Never Used     Social History     Substance and Sexual Activity   Drug Use Never       Family History:    non-contributory    Physical Exam:     Vitals:   Blood Pressure: 134/89 (21 1235)  Pulse: 101 (21 1235)  Temperature: 98 9 °F (37 2 °C) (21 09)  Temp Source: Temporal (21)  Respirations: 18 (21 1235)  Height: 5' 3" (160 cm) (21 09)  Weight - Scale: 102 kg (225 lb) (21 09)  SpO2: 94 % (21 1235)    Physical Exam  Vitals signs and nursing note reviewed  Exam conducted with a chaperone present  Constitutional:       Appearance: She is not diaphoretic     HENT: Mouth/Throat:      Mouth: Mucous membranes are moist       Pharynx: No oropharyngeal exudate  Eyes:      General: No scleral icterus  Conjunctiva/sclera: Conjunctivae normal       Pupils: Pupils are equal, round, and reactive to light  Neck:      Musculoskeletal: Normal range of motion  Cardiovascular:      Rate and Rhythm: Normal rate  Heart sounds: No friction rub  No gallop  Pulmonary:      Effort: Pulmonary effort is normal  No respiratory distress  Breath sounds: No stridor  No wheezing, rhonchi or rales  Chest:      Chest wall: No tenderness  Abdominal:      General: Abdomen is flat  Bowel sounds are normal  There is no distension  Palpations: There is no mass  Tenderness: There is no abdominal tenderness  There is no right CVA tenderness, left CVA tenderness or guarding  Hernia: No hernia is present  Musculoskeletal: Normal range of motion  Right lower leg: No edema  Left lower leg: No edema  Lymphadenopathy:      Cervical: No cervical adenopathy  Skin:     General: Skin is warm  Neurological:      General: No focal deficit present  Mental Status: She is alert and oriented to person, place, and time  Cranial Nerves: No cranial nerve deficit  Sensory: No sensory deficit  Motor: No weakness  Coordination: Coordination normal       Gait: Gait normal    Psychiatric:         Mood and Affect: Mood normal            Additional Data:     Lab Results: I have personally reviewed pertinent reports        Results from last 7 days   Lab Units 02/03/21  1032   WBC Thousand/uL 17 83*   HEMOGLOBIN g/dL 16 7*   HEMATOCRIT % 47 7*   PLATELETS Thousands/uL 192   NEUTROS PCT % 91*   LYMPHS PCT % 5*   MONOS PCT % 3*   EOS PCT % 0     Results from last 7 days   Lab Units 02/03/21  1032   SODIUM mmol/L 135*   POTASSIUM mmol/L 3 9   CHLORIDE mmol/L 100   CO2 mmol/L 21   BUN mg/dL 9   CREATININE mg/dL 0 98   ANION GAP mmol/L 14*   CALCIUM mg/dL 9 0 ALBUMIN g/dL 3 5   TOTAL BILIRUBIN mg/dL 0 94   ALK PHOS U/L 116   ALT U/L 35   AST U/L 18   GLUCOSE RANDOM mg/dL 264*     Results from last 7 days   Lab Units 02/03/21  1032   INR  0 97             Results from last 7 days   Lab Units 02/03/21  1032   LACTIC ACID mmol/L 3 1*       Imaging: I have personally reviewed pertinent reports  CT renal stone study abdomen pelvis without contrast   Final Result by Melida Willis DO (02/03 1017)      Mild left hydronephrosis and perinephric stranding secondary to obstructing 0 4 cm stone in the left mid ureter  The study was marked in [facility] for immediate notification  Workstation performed: [id number]             EKG, Pathology, and Other Studies Reviewed on Admission:   · EKG: reviewed    Allscripts / Epic Records Reviewed: Yes     ** Please Note: This note has been constructed using a voice recognition system   **

## 2021-02-04 VITALS
OXYGEN SATURATION: 89 % | WEIGHT: 223.44 LBS | TEMPERATURE: 98.8 F | DIASTOLIC BLOOD PRESSURE: 55 MMHG | BODY MASS INDEX: 39.59 KG/M2 | HEART RATE: 126 BPM | SYSTOLIC BLOOD PRESSURE: 119 MMHG | RESPIRATION RATE: 21 BRPM | HEIGHT: 63 IN

## 2021-02-04 PROBLEM — A41.9 SEPSIS (HCC): Status: ACTIVE | Noted: 2021-02-04

## 2021-02-04 PROBLEM — R31.0 GROSS HEMATURIA: Status: ACTIVE | Noted: 2021-02-04

## 2021-02-04 PROBLEM — R65.10 SIRS (SYSTEMIC INFLAMMATORY RESPONSE SYNDROME) (HCC): Status: RESOLVED | Noted: 2021-02-03 | Resolved: 2021-02-04

## 2021-02-04 PROBLEM — E11.9 TYPE 2 DIABETES MELLITUS WITHOUT COMPLICATION, WITHOUT LONG-TERM CURRENT USE OF INSULIN (HCC): Status: ACTIVE | Noted: 2021-02-03

## 2021-02-04 LAB
ALBUMIN SERPL BCP-MCNC: 2.6 G/DL (ref 3.5–5)
ALP SERPL-CCNC: 86 U/L (ref 46–116)
ALT SERPL W P-5'-P-CCNC: 25 U/L (ref 12–78)
ANION GAP SERPL CALCULATED.3IONS-SCNC: 12 MMOL/L (ref 4–13)
AST SERPL W P-5'-P-CCNC: 14 U/L (ref 5–45)
BASOPHILS # BLD AUTO: 0.01 THOUSANDS/ΜL (ref 0–0.1)
BASOPHILS NFR BLD AUTO: 0 % (ref 0–1)
BILIRUB SERPL-MCNC: 0.87 MG/DL (ref 0.2–1)
BUN SERPL-MCNC: 9 MG/DL (ref 5–25)
CALCIUM ALBUM COR SERPL-MCNC: 9.5 MG/DL (ref 8.3–10.1)
CALCIUM SERPL-MCNC: 8.4 MG/DL (ref 8.3–10.1)
CHLORIDE SERPL-SCNC: 102 MMOL/L (ref 100–108)
CO2 SERPL-SCNC: 22 MMOL/L (ref 21–32)
CREAT SERPL-MCNC: 0.91 MG/DL (ref 0.6–1.3)
EOSINOPHIL # BLD AUTO: 0.02 THOUSAND/ΜL (ref 0–0.61)
EOSINOPHIL NFR BLD AUTO: 0 % (ref 0–6)
ERYTHROCYTE [DISTWIDTH] IN BLOOD BY AUTOMATED COUNT: 12.9 % (ref 11.6–15.1)
GFR SERPL CREATININE-BSD FRML MDRD: 75 ML/MIN/1.73SQ M
GLUCOSE P FAST SERPL-MCNC: 221 MG/DL (ref 65–99)
GLUCOSE SERPL-MCNC: 191 MG/DL (ref 65–140)
GLUCOSE SERPL-MCNC: 221 MG/DL (ref 65–140)
HCT VFR BLD AUTO: 40.9 % (ref 34.8–46.1)
HGB BLD-MCNC: 13.8 G/DL (ref 11.5–15.4)
IMM GRANULOCYTES # BLD AUTO: 0.04 THOUSAND/UL (ref 0–0.2)
IMM GRANULOCYTES NFR BLD AUTO: 0 % (ref 0–2)
LYMPHOCYTES # BLD AUTO: 0.38 THOUSANDS/ΜL (ref 0.6–4.47)
LYMPHOCYTES NFR BLD AUTO: 4 % (ref 14–44)
MCH RBC QN AUTO: 31.2 PG (ref 26.8–34.3)
MCHC RBC AUTO-ENTMCNC: 33.7 G/DL (ref 31.4–37.4)
MCV RBC AUTO: 92 FL (ref 82–98)
MONOCYTES # BLD AUTO: 0.68 THOUSAND/ΜL (ref 0.17–1.22)
MONOCYTES NFR BLD AUTO: 7 % (ref 4–12)
NEUTROPHILS # BLD AUTO: 9.17 THOUSANDS/ΜL (ref 1.85–7.62)
NEUTS SEG NFR BLD AUTO: 89 % (ref 43–75)
NRBC BLD AUTO-RTO: 0 /100 WBCS
PLATELET # BLD AUTO: 111 THOUSANDS/UL (ref 149–390)
PMV BLD AUTO: 9 FL (ref 8.9–12.7)
POTASSIUM SERPL-SCNC: 3.6 MMOL/L (ref 3.5–5.3)
PROCALCITONIN SERPL-MCNC: 7.05 NG/ML
PROT SERPL-MCNC: 6.6 G/DL (ref 6.4–8.2)
RBC # BLD AUTO: 4.43 MILLION/UL (ref 3.81–5.12)
SODIUM SERPL-SCNC: 136 MMOL/L (ref 136–145)
WBC # BLD AUTO: 10.3 THOUSAND/UL (ref 4.31–10.16)

## 2021-02-04 PROCEDURE — 99217 PR OBSERVATION CARE DISCHARGE MANAGEMENT: CPT | Performed by: FAMILY MEDICINE

## 2021-02-04 PROCEDURE — 82948 REAGENT STRIP/BLOOD GLUCOSE: CPT

## 2021-02-04 PROCEDURE — 80053 COMPREHEN METABOLIC PANEL: CPT | Performed by: FAMILY MEDICINE

## 2021-02-04 PROCEDURE — 85025 COMPLETE CBC W/AUTO DIFF WBC: CPT | Performed by: FAMILY MEDICINE

## 2021-02-04 RX ORDER — LEVOFLOXACIN 750 MG/1
750 TABLET ORAL EVERY 24 HOURS
Qty: 5 TABLET | Refills: 0 | Status: SHIPPED | OUTPATIENT
Start: 2021-02-04 | End: 2021-02-05

## 2021-02-04 RX ORDER — ONDANSETRON 4 MG/1
4 TABLET, FILM COATED ORAL EVERY 8 HOURS PRN
Qty: 20 TABLET | Refills: 0 | Status: SHIPPED | OUTPATIENT
Start: 2021-02-04 | End: 2021-04-27 | Stop reason: HOSPADM

## 2021-02-04 RX ORDER — TAMSULOSIN HYDROCHLORIDE 0.4 MG/1
0.4 CAPSULE ORAL
Qty: 15 CAPSULE | Refills: 0 | Status: SHIPPED | OUTPATIENT
Start: 2021-02-04 | End: 2021-04-26

## 2021-02-04 RX ORDER — OXYCODONE HYDROCHLORIDE AND ACETAMINOPHEN 5; 325 MG/1; MG/1
1 TABLET ORAL EVERY 4 HOURS PRN
Qty: 12 TABLET | Refills: 0 | Status: SHIPPED | OUTPATIENT
Start: 2021-02-04 | End: 2021-04-27 | Stop reason: HOSPADM

## 2021-02-04 RX ORDER — NICOTINE 21 MG/24HR
1 PATCH, TRANSDERMAL 24 HOURS TRANSDERMAL DAILY
Qty: 28 PATCH | Refills: 0 | Status: SHIPPED | OUTPATIENT
Start: 2021-02-05 | End: 2021-02-07 | Stop reason: HOSPADM

## 2021-02-04 RX ORDER — IBUPROFEN 400 MG/1
400 TABLET ORAL ONCE
Status: COMPLETED | OUTPATIENT
Start: 2021-02-04 | End: 2021-02-04

## 2021-02-04 RX ADMIN — IBUPROFEN 400 MG: 400 TABLET ORAL at 04:54

## 2021-02-04 RX ADMIN — CIPROFLOXACIN 400 MG: 2 INJECTION, SOLUTION INTRAVENOUS at 01:42

## 2021-02-04 RX ADMIN — SODIUM CHLORIDE 125 ML/HR: 0.9 INJECTION, SOLUTION INTRAVENOUS at 04:15

## 2021-02-04 RX ADMIN — MORPHINE SULFATE 2 MG: 2 INJECTION, SOLUTION INTRAMUSCULAR; INTRAVENOUS at 04:12

## 2021-02-04 NOTE — ASSESSMENT & PLAN NOTE
Most likely secondary to nephrolithiasis  Hemoglobin remained stable  Increase p o   Hydration  Avoid any kind of anticoagulation at least next 7 days

## 2021-02-04 NOTE — DISCHARGE INSTRUCTIONS
Kidney Stones   WHAT YOU NEED TO KNOW:   Kidney stones form in the urinary system when the water and waste in your urine are out of balance  When this happens, certain types of waste crystals separate from the urine  The crystals build up and form kidney stones  You may have more than one kidney stone  DISCHARGE INSTRUCTIONS:   Return to the emergency department if:   · You have vomiting that is not relieved by medicine  Contact your healthcare provider if:   · You have a fever  · You have trouble passing urine  · You see blood in your urine  · You have severe pain  · You have any questions or concerns about your condition or care  Medicines:   · NSAIDs , such as ibuprofen, help decrease swelling, pain, and fever  This medicine is available with or without a doctor's order  NSAIDs can cause stomach bleeding or kidney problems in certain people  If you take blood thinner medicine, always ask your healthcare provider if NSAIDs are safe for you  Always read the medicine label and follow directions  · Prescription pain medicine  may be given  Ask your healthcare provider how to take this medicine safely  Some prescription pain medicines contain acetaminophen  Do not take other medicines that contain acetaminophen without talking to your healthcare provider  Too much acetaminophen may cause liver damage  Prescription pain medicine may cause constipation  Ask your healthcare provider how to prevent or treat constipation  · Medicines  to balance your electrolytes may be needed  · Take your medicine as directed  Contact your healthcare provider if you think your medicine is not helping or if you have side effects  Tell him or her if you are allergic to any medicine  Keep a list of the medicines, vitamins, and herbs you take  Include the amounts, and when and why you take them  Bring the list or the pill bottles to follow-up visits   Carry your medicine list with you in case of an emergency  Follow up with your healthcare provider as directed: You may need to return for more tests  Write down your questions so you remember to ask them during your visits  What you can do to manage kidney stones:   · Drink more liquids  Your healthcare provider may tell you to drink at least 8 to 12 (eight-ounce) cups of liquids each day  This helps flush out the kidney stones when you urinate  Water is the best liquid to drink  · Strain your urine every time you go to the bathroom  Urinate through a strainer or a piece of thin cloth to catch the stones  Take the stones to your healthcare provider so they can be sent to the lab for tests  This will help your healthcare providers plan the best treatment for you  · Eat a variety of healthy foods  Healthy foods include fruits, vegetables, whole-grain breads, low-fat dairy products, beans, and fish  You may need to limit how much sodium (salt) or protein you eat  Ask for information about the best foods for you  · Stay active  Your stones may pass more easily if you stay active  Exercise can also help you manage your weight  Ask about the best activities for you  After you pass the kidney stones: Your healthcare provider may  order a 24-hour urine test  Results from a 24-hour urine test will help your healthcare provider plan ways to prevent more stones from forming  Your healthcare provider will give you more instructions  © Copyright [address] Information is for End User's use only and may not be sold, redistributed or otherwise used for commercial purposes  All illustrations and images included in CareNotes® are the copyrighted property of A D A M , Inc  or [address] [place] [address]  The above information is an  only  It is not intended as medical advice for individual conditions or treatments   Talk to your doctor, nurse or pharmacist before following any medical regimen to see if it is safe and effective for you

## 2021-02-04 NOTE — NURSING NOTE
Pt has temp, tylenol was going to be given however pt refused, states "I don't want that, I get heart palpations  " provider was notified and RN made aware

## 2021-02-04 NOTE — PLAN OF CARE
Problem: Potential for Falls  Goal: Patient will remain free of falls  Description: INTERVENTIONS:  - Assess patient frequently for physical needs  -  Identify cognitive and physical deficits and behaviors that affect risk of falls    -  [place] fall precautions as indicated by assessment   - Educate patient/family on patient safety including physical limitations  - Instruct patient to call for assistance with activity based on assessment  - Modify environment to reduce risk of injury  - Consider OT/PT consult to assist with strengthening/mobility  Outcome: Progressing     Problem: PAIN - ADULT  Goal: Verbalizes/displays adequate comfort level or baseline comfort level  Description: Interventions:  - Encourage patient to monitor pain and request assistance  - Assess pain using appropriate pain scale  - Administer analgesics based on type and severity of pain and evaluate response  - Implement non-pharmacological measures as appropriate and evaluate response  - Consider cultural and social influences on pain and pain management  - Notify physician/advanced practitioner if interventions unsuccessful or patient reports new pain  Outcome: Progressing     Problem: INFECTION - ADULT  Goal: Absence or prevention of progression during hospitalization  Description: INTERVENTIONS:  - Assess and monitor for signs and symptoms of infection  - Monitor lab/diagnostic results  - Monitor all insertion sites, i e  indwelling lines, tubes, and drains  - Monitor endotracheal if appropriate and nasal secretions for changes in amount and color  - [place] appropriate cooling/warming therapies per order  - Administer medications as ordered  - Instruct and encourage patient and family to use good hand hygiene technique  - Identify and instruct in appropriate isolation precautions for identified infection/condition  Outcome: Progressing     Problem: SAFETY ADULT  Goal: Patient will remain free of falls  Description: INTERVENTIONS:  - Assess patient frequently for physical needs  -  Identify cognitive and physical deficits and behaviors that affect risk of falls    -  [place] fall precautions as indicated by assessment   - Educate patient/family on patient safety including physical limitations  - Instruct patient to call for assistance with activity based on assessment  - Modify environment to reduce risk of injury  - Consider OT/PT consult to assist with strengthening/mobility  Outcome: Progressing  Goal: Maintain or return to baseline ADL function  Description: INTERVENTIONS:  -  Assess patient's ability to carry out ADLs; assess patient's baseline for ADL function and identify physical deficits which impact ability to perform ADLs (bathing, care of mouth/teeth, toileting, grooming, dressing, etc )  - Assess/evaluate cause of self-care deficits   - Assess range of motion  - Assess patient's mobility; develop plan if impaired  - Assess patient's need for assistive devices and provide as appropriate  - Encourage maximum independence but intervene and supervise when necessary  - Involve family in performance of ADLs  - Assess for home care needs following discharge   - Consider OT consult to assist with ADL evaluation and planning for discharge  - Provide patient education as appropriate  Outcome: Progressing  Goal: Maintain or return mobility status to optimal level  Description: INTERVENTIONS:  - Assess patient's baseline mobility status (ambulation, transfers, stairs, etc )    - Identify cognitive and physical deficits and behaviors that affect mobility  - Identify mobility aids required to assist with transfers and/or ambulation (gait belt, sit-to-stand, lift, walker, cane, etc )  - [place] fall precautions as indicated by assessment  - Record patient progress and toleration of activity level on Mobility SBAR; progress patient to next Phase/Stage  - Instruct patient to call for assistance with activity based on assessment  - Consider rehabilitation consult to assist with strengthening/weightbearing, etc   Outcome: Progressing     Problem: DISCHARGE PLANNING  Goal: Discharge to home or other facility with appropriate resources  Description: INTERVENTIONS:  - Identify barriers to discharge w/patient and caregiver  - Arrange for needed discharge resources and transportation as appropriate  - Identify discharge learning needs (meds, wound care, etc )  - Arrange for interpretive services to assist at discharge as needed  - Refer to Case Management Department for coordinating discharge planning if the patient needs post-hospital services based on physician/advanced practitioner order or complex needs related to functional status, cognitive ability, or social support system  Outcome: Progressing     Problem: Knowledge Deficit  Goal: Patient/family/caregiver demonstrates understanding of disease process, treatment plan, medications, and discharge instructions  Description: Complete learning assessment and assess knowledge base    Interventions:  - Provide teaching at level of understanding  - Provide teaching via preferred learning methods  Outcome: Progressing

## 2021-02-04 NOTE — PLAN OF CARE
Problem: Potential for Falls  Goal: Patient will remain free of falls  Description: INTERVENTIONS:  - Assess patient frequently for physical needs  -  Identify cognitive and physical deficits and behaviors that affect risk of falls    -  [place] fall precautions as indicated by assessment   - Educate patient/family on patient safety including physical limitations  - Instruct patient to call for assistance with activity based on assessment  - Modify environment to reduce risk of injury  - Consider OT/PT consult to assist with strengthening/mobility  2/4/2021 0824 by Elizabeth Garnica RN  Outcome: Progressing  2/4/2021 0823 by Elizabeth Garnica RN  Outcome: Progressing     Problem: PAIN - ADULT  Goal: Verbalizes/displays adequate comfort level or baseline comfort level  Description: Interventions:  - Encourage patient to monitor pain and request assistance  - Assess pain using appropriate pain scale  - Administer analgesics based on type and severity of pain and evaluate response  - Implement non-pharmacological measures as appropriate and evaluate response  - Consider cultural and social influences on pain and pain management  - Notify physician/advanced practitioner if interventions unsuccessful or patient reports new pain  2/4/2021 0824 by Elizabeth Garnica RN  Outcome: Progressing  2/4/2021 0823 by Elizabeth Garnica RN  Outcome: Progressing     Problem: INFECTION - ADULT  Goal: Absence or prevention of progression during hospitalization  Description: INTERVENTIONS:  - Assess and monitor for signs and symptoms of infection  - Monitor lab/diagnostic results  - Monitor all insertion sites, i e  indwelling lines, tubes, and drains  - Monitor endotracheal if appropriate and nasal secretions for changes in amount and color  - [place] appropriate cooling/warming therapies per order  - Administer medications as ordered  - Instruct and encourage patient and family to use good hand hygiene technique  - Identify and instruct in appropriate isolation precautions for identified infection/condition  2/4/2021 0824 by Jessie Nixon RN  Outcome: Progressing  2/4/2021 0823 by Jessie Nixon RN  Outcome: Progressing  Goal: Absence of fever/infection during neutropenic period  Description: INTERVENTIONS:  - Monitor WBC    2/4/2021 0824 by Jessie Nixon RN  Outcome: Progressing  2/4/2021 0823 by Jessie Nixon RN  Outcome: Progressing     Problem: SAFETY ADULT  Goal: Patient will remain free of falls  Description: INTERVENTIONS:  - Assess patient frequently for physical needs  -  Identify cognitive and physical deficits and behaviors that affect risk of falls    -  [place] fall precautions as indicated by assessment   - Educate patient/family on patient safety including physical limitations  - Instruct patient to call for assistance with activity based on assessment  - Modify environment to reduce risk of injury  - Consider OT/PT consult to assist with strengthening/mobility  2/4/2021 0824 by Jessie Nixon RN  Outcome: Progressing  2/4/2021 0823 by Jessie Nixon RN  Outcome: Progressing  Goal: Maintain or return to baseline ADL function  Description: INTERVENTIONS:  -  Assess patient's ability to carry out ADLs; assess patient's baseline for ADL function and identify physical deficits which impact ability to perform ADLs (bathing, care of mouth/teeth, toileting, grooming, dressing, etc )  - Assess/evaluate cause of self-care deficits   - Assess range of motion  - Assess patient's mobility; develop plan if impaired  - Assess patient's need for assistive devices and provide as appropriate  - Encourage maximum independence but intervene and supervise when necessary  - Involve family in performance of ADLs  - Assess for home care needs following discharge   - Consider OT consult to assist with ADL evaluation and planning for discharge  - Provide patient education as appropriate  2/4/2021 0824 by Jessie Nixon RN  Outcome: Progressing  2/4/2021 0823 by Tae Kat RN  Outcome: Progressing  Goal: Maintain or return mobility status to optimal level  Description: INTERVENTIONS:  - Assess patient's baseline mobility status (ambulation, transfers, stairs, etc )    - Identify cognitive and physical deficits and behaviors that affect mobility  - Identify mobility aids required to assist with transfers and/or ambulation (gait belt, sit-to-stand, lift, walker, cane, etc )  - [place] fall precautions as indicated by assessment  - Record patient progress and toleration of activity level on Mobility SBAR; progress patient to next Phase/Stage  - Instruct patient to call for assistance with activity based on assessment  - Consider rehabilitation consult to assist with strengthening/weightbearing, etc   2/4/2021 0824 by Tae Kat RN  Outcome: Progressing  2/4/2021 0823 by Tae Kat RN  Outcome: Progressing     Problem: DISCHARGE PLANNING  Goal: Discharge to home or other facility with appropriate resources  Description: INTERVENTIONS:  - Identify barriers to discharge w/patient and caregiver  - Arrange for needed discharge resources and transportation as appropriate  - Identify discharge learning needs (meds, wound care, etc )  - Arrange for interpretive services to assist at discharge as needed  - Refer to Case Management Department for coordinating discharge planning if the patient needs post-hospital services based on physician/advanced practitioner order or complex needs related to functional status, cognitive ability, or social support system  2/4/2021 0824 by Tae Kat RN  Outcome: Progressing  2/4/2021 0823 by Tae Kat RN  Outcome: Progressing     Problem: Knowledge Deficit  Goal: Patient/family/caregiver demonstrates understanding of disease process, treatment plan, medications, and discharge instructions  Description: Complete learning assessment and assess knowledge base   Interventions:  - Provide teaching at level of understanding  - Provide teaching via preferred learning methods  2/4/2021 0824 by Mary Curtis RN  Outcome: Progressing  2/4/2021 0823 by Mary Curtis RN  Outcome: Progressing

## 2021-02-04 NOTE — ASSESSMENT & PLAN NOTE
Newly diagnosed  A1c level is 7 7%  Patient is to follow-up with outpatient PCP for further recommendation-since patient has nephrolithiasis with mild hematuria will avoid aspirin Plavix at this time as well as lisinopril  Once condition stable consider to start lisinopril and aspirin outpatient basis    Consider to start oral antihyperglycemic based on patient's preference at this time suggested to follow healthy diet and exercise

## 2021-02-04 NOTE — NURSING NOTE
Pt was given discharge instructions and verbalized understanding of same   Pt signed AMA paper with dr Matti Rangel

## 2021-02-04 NOTE — DISCHARGE SUMMARY
Discharge- Trey Roger 1970, 52 y o  female MRN: [id number]    Unit/Bed#: -01 Encounter: [id number]    Primary Care Provider: BRETT Boone   Date and time admitted to hospital: 2/3/2021  9:27 AM        Intractable pain  Assessment & Plan  Improved  Most nephrolithiasis,  CT scan renal protocol patient does 0 4 cm left-sided stone with mild hydronephrosis and straining  Does have elevated WBC lactic acidosis  Continue IV hydration  Continue pain management  Patient already received Bactrim in ER, will continue IV ciprofloxacin  Monitor labs  Urology consult appreciated    * Sepsis [facility])  Assessment & Plan  Patient was having tachycardia, elevated temperature elevated WBC  Most likely secondary nephrolithiasis-?? Pyelonephritis  Patient started on ciprofloxacin IV  assume patient already passed the stone as flank pain improved  Patient signed AMA-risk, benefits, side-effects and alternatives discussed in details  Pending blood culture, urine culture    SIRS (systemic inflammatory response syndrome) (HCC)-resolved as of 2/4/2021  Assessment & Plan  Secondary to renal stone  Patient is tachycardic, elevated WBC,  Continue IV ciprofloxacin, new IV hydration  Urine shows small blood    Nephrolithiasis  Assessment & Plan  As per CT scan renal protocol shows:Mild left hydronephrosis and perinephric stranding secondary to obstructing 0 4 cm stone in the left mid ureter  Patient does have previous history of renal stone  Continue pain management, IV hydration, Flomax, IV antibiotic  Urology consult appreciated  As per patient, her pain improved, and seems like she passed the stone    Gross hematuria  Assessment & Plan  Most likely secondary to nephrolithiasis  Hemoglobin remained stable  Increase p o   Hydration  Avoid any kind of anticoagulation at least next 7 days    Tobacco abuse  Assessment & Plan  Patient smokes 1 pack per day for last 29 years  Continue Nicoderm patch    Type 2 diabetes mellitus without complication, without long-term current use of insulin (HCC)  Assessment & Plan  Newly diagnosed  A1c level is 7 7%  Patient is to follow-up with outpatient PCP for further recommendation-since patient has nephrolithiasis with mild hematuria will avoid aspirin Plavix at this time as well as lisinopril  Once condition stable consider to start lisinopril and aspirin outpatient basis  Consider to start oral antihyperglycemic based on patient's preference at this time suggested to follow healthy diet and exercise    Increased anion gap metabolic acidosis  Assessment & Plan  Improved with IV hydration        Discharging Physician / Practitioner: Kasia Abdalla MD  PCP: BRETT Aranda  Admission Date:   Admission Orders (From admission, onward)     Ordered        02/03/21 1126  Place in Observation  Once                   Discharge Date: 02/04/21    Resolved Problems  Date Reviewed: 2/3/2021          Resolved    SIRS [facility]systemic inflammatory response syndrome) ([facility] 2/4/2021     Resolved by  Kasia Abdalla MD          Consultations During Hospital Stay:  · Urology    Procedures Performed:   CT renal stone study abdomen pelvis without contrast   Final Result by Matthias Salas DO (02/03 1017)      Mild left hydronephrosis and perinephric stranding secondary to obstructing 0 4 cm stone in the left mid ureter  The study was marked in [facility] for immediate notification               Workstation performed: [id number]           ·     Significant Findings / Test Results:   Lab Results   Component Value Date    WBC 10 30 (H) 02/04/2021    HGB 13 8 02/04/2021    HCT 40 9 02/04/2021    MCV 92 02/04/2021     (L) 02/04/2021     Lab Results   Component Value Date    SODIUM 136 02/04/2021    K 3 6 02/04/2021     02/04/2021    CO2 22 02/04/2021    AGAP 12 02/04/2021    BUN 9 02/04/2021    CREATININE 0 91 02/04/2021    GLUC 221 (H) 02/04/2021    GLUF 221 (H) 02/04/2021    CALCIUM 8 4 02/04/2021    AST 14 02/04/2021    ALT 25 02/04/2021    ALKPHOS 86 02/04/2021    TP 6 6 02/04/2021    TBILI 0 87 02/04/2021    EGFR 75 02/04/2021     Microbiology Results (last 21 days)    Collected Updated Procedure Result Status Patient Facility Result Comment    02/03/2021 2241 02/03/2021 2247 Blood culture [[id number]]   Blood from Arm, Left    In process [address]  Component Value   No component results              02/03/2021 2241 02/03/2021 2247 Blood culture [[id number]]   Blood from Arm, Right    In process [address]  Component Value   No component results              02/03/2021 0959 02/03/2021 2225 Urine culture [[id number]]   Urine, Clean Catch    In process [address]  Component Value   No component results        ·     Incidental Findings:   · As mentioned above     Test Results Pending at Discharge (will require follow up): · Blood culture, urine culture, procalcitonin     Outpatient Tests Requested:  · none    Complications:  none    Reason for Admission:  Left flank pain    Hospital Course:     Ayde Cline is a 52 y o  female patient who originally presented to the hospital on 2/3/2021 due to left flank pain most likely secondary to nephrolithiasis  Patient admitted under the diagnosis sepsis, intractable flank pain secondary to nephrolithiasis  CT scan renal protocol shows 4 mm stone as well as mild hydronephrosis and perinephric straining  Patient started on IV fluid, IV ciprofloxacin, Flomax and pain medications  Urology consulted  This morning, patient reports she might pass the stone, her pain improved  Patient also reports she noticed some blood in the urine, but color is getting lighter  As per chart review, patient was spiking fever  Discussed all the risks, benefits, side-effects and alternatives and complications in details  Patient verbalizes to understand and signed AMA      Patient will be discharged with levofloxacin, pain medication, Zofran  Advised the patient to increase p o  Hydration  ER precaution provided  PCP and Urology information provided advised the patient to contact with doctor's office to get appointment  The patient, initially admitted to the hospital as inpatient, was discharged earlier than expected given the following: signed AMA  Please see above list of diagnoses and related plan for additional information  Condition at Discharge: fair     Discharge Day Visit / Exam:     Subjective:  Seen and evaluated during the round  Patient reports she feels much better and her pain is improving  Also reports some blood in the urine but is lighter in color  Patient adamant to go home and signed AMA  Vitals: Blood Pressure: 119/55 (02/04/21 0658)  Pulse: (!) 126 (02/04/21 0826)  Temperature: 98 8 °F (37 1 °C) (02/04/21 0826)  Temp Source: Oral (02/03/21 1421)  Respirations: 21 (02/04/21 0658)  Height: 5' 3" (160 cm) (02/03/21 0932)  Weight - Scale: 101 kg (223 lb 7 oz) (02/04/21 0600)  SpO2: (!) 89 % (02/04/21 4796)  Exam:   Physical Exam  Vitals signs and nursing note reviewed  Constitutional:       Appearance: She is not diaphoretic  HENT:      Right Ear: There is no impacted cerumen  Nose: No congestion or rhinorrhea  Mouth/Throat:      Mouth: Mucous membranes are moist       Pharynx: Oropharynx is clear  No oropharyngeal exudate  Eyes:      General: No scleral icterus  Conjunctiva/sclera: Conjunctivae normal       Pupils: Pupils are equal, round, and reactive to light  Neck:      Musculoskeletal: Normal range of motion  Cardiovascular:      Rate and Rhythm: Tachycardia present  Heart sounds: No friction rub  No gallop  Pulmonary:      Effort: Pulmonary effort is normal  No respiratory distress  Breath sounds: No stridor  No wheezing, rhonchi or rales  Abdominal:      General: Abdomen is flat   Bowel sounds are normal  There is no distension  Palpations: There is no mass  Tenderness: There is no abdominal tenderness  There is no guarding or rebound  Hernia: No hernia is present  Musculoskeletal: Normal range of motion  Right lower leg: No edema  Lymphadenopathy:      Cervical: No cervical adenopathy  Skin:     General: Skin is warm  Capillary Refill: Capillary refill takes less than 2 seconds  Findings: No lesion  Neurological:      General: No focal deficit present  Mental Status: She is alert and oriented to person, place, and time  Cranial Nerves: No cranial nerve deficit  Motor: No weakness  Gait: Gait normal    Psychiatric:         Mood and Affect: Mood normal        Discussion with Family: with patient    Discharge instructions/Information to patient and family:   See after visit summary for information provided to patient and family  Provisions for Follow-Up Care:  See after visit summary for information related to follow-up care and any pertinent home health orders  Disposition:     Home- Signed AMA    For Discharges to [facility] SNF:   · Not Applicable to this Patient - Not Applicable to this Patient    Planned Readmission:  If condition get worse     Discharge Statement:  I spent >35 minutes discharging the patient  This time was spent on the day of discharge  I had direct contact with the patient on the day of discharge  Greater than 50% of the total time was spent examining patient, answering all patient questions, arranging and discussing plan of care with patient as well as directly providing post-discharge instructions  Additional time then spent on discharge activities  Discharge Medications:  See after visit summary for reconciled discharge medications provided to patient and family        ** Please Note: This note has been constructed using a voice recognition system **

## 2021-02-04 NOTE — ASSESSMENT & PLAN NOTE
Patient was having tachycardia, elevated temperature elevated WBC  Most likely secondary nephrolithiasis-?? Pyelonephritis  Patient started on ciprofloxacin IV  assume patient already passed the stone as flank pain improved  Patient signed AMA-risk, benefits, side-effects and alternatives discussed in details    Pending blood culture, urine culture

## 2021-02-04 NOTE — UTILIZATION REVIEW
Initial Clinical Review    Admission: Date/Time/Statement:   Admission Orders (From admission, onward)     Ordered        02/03/21 1126  Place in Observation  Once                   Orders Placed This Encounter   Procedures    Place in Observation     Standing Status:   Standing     Number of Occurrences:   1     Order Specific Question:   Level of Care     Answer:   Med Surg [16]     ED Arrival Information     Expected Arrival Acuity Means of Arrival Escorted By Service Admission Type    - 2/3/2021 09:21 Urgent Walk-In Self Hospitalist Urgent    Arrival Complaint    Flank Pain        Chief Complaint   Patient presents with    Flank Pain     left flank pain that started yesterday  Assessment/Plan: 52year old female to the ED from home with complaints of left sided flank pain which started a day prior  Admitted under observation for intractable pain, nephrolithiasis, SIRS  H/O kidney stones, reflux esophagitis, diaphragmatic hernia status post appendectomy presents with left flank pain   She has had nausea, left flank pain radiating to left lower quadrant and groin  The day prior, she has urinary urgency, but that has since resolved  WBCs , lactic acid elevated  CT shows: Mild left hydronephrosis and perinephric stranding secondary to obstructing 0 4 cm stone in the left mid ureter  Started on IV fluids, IV abx  Given IV zofran, toradol in the ED  Urology consult  2/3 Urology consult: Obstructing 4 mm left ureteral stone with mild hydronephrosis and left perinephric stranding noted on CT of abdomen pelvis without IV contrast   LEft pyelonephritis  Normal kidney function  Continue IV hydration  and IV abx  Repeat Lactic acid  Repeat CBC, BMP, Renal function  Conservative stone management at this time         ED Triage Vitals   Temperature Pulse Respirations Blood Pressure SpO2   02/03/21 0932 02/03/21 0932 02/03/21 1235 02/03/21 0932 02/03/21 0932   98 9 °F (37 2 °C) (!) 127 18 145/84 97 %      Temp Source Heart Rate Source Patient Position - Orthostatic VS BP Location FiO2 (%)   02/03/21 0932 02/03/21 0932 02/03/21 1235 02/03/21 0932 --   Temporal Monitor Lying Left arm       Pain Score       02/03/21 1042       Worst Possible Pain          Wt Readings from Last 1 Encounters:   02/04/21 101 kg (223 lb 7 oz)     Additional Vital Signs:  Date/Time  Temp  Pulse  Resp  BP  MAP (mmHg)  SpO2  O2 Device  Patient Position - Orthostatic VS   02/04/21 06:58:52  100 8 °F (38 2 °C)Abnormal   126Abnormal   21  119/55  76  89 %Abnormal   --  --   02/04/21 06:08:48  100 3 °F (37 9 °C)  123Abnormal   --  --  --  89 %Abnormal   --  --   02/04/21 0529  --  --  --  --  --  --  None (Room air)  --   02/04/21 04:14:59  101 7 °F (38 7 °C)Abnormal   124Abnormal   --  --  --  86 %Abnormal   --  --   02/03/21 22:43:12  100 °F (37 8 °C)  106Abnormal   18  129/76  94  92 %  --  --   02/03/21 21:00:04  98 6 °F (37 °C)  104  18  --  --  92 %  --  --   02/03/21 1900  100 2 °F (37 9 °C)  --  --  --  --  --  --  --   02/03/21 14:21:54  100 5 °F (38 1 °C)  108Abnormal   17  128/75  93  93 %  None (Room air)  Lying   02/03/21 1408  --  105  --  135/76  --  94 %  None (Room air)  Lying   02/03/21 1235  --  101  18  134/89  106  94 %  None (Room air)  Lying   02/03/21 0932  98 9 °F (37 2 °C)                     Pertinent Labs/Diagnostic Test Results:   2/3 CT A/P: Mild left hydronephrosis and perinephric stranding secondary to obstructing 0 4 cm stone in the left mid ureter           Results from last 7 days   Lab Units 02/04/21  0457 02/03/21  1032   WBC Thousand/uL 10 30* 17 83*   HEMOGLOBIN g/dL 13 8 16 7*   HEMATOCRIT % 40 9 47 7*   PLATELETS Thousands/uL 111* 192   NEUTROS ABS Thousands/µL 9 17* 16 28*         Results from last 7 days   Lab Units 02/04/21  0457 02/03/21  1032   SODIUM mmol/L 136 135*   POTASSIUM mmol/L 3 6 3 9   CHLORIDE mmol/L 102 100   CO2 mmol/L 22 21   ANION GAP mmol/L 12 14*   BUN mg/dL 9 9   CREATININE mg/dL 0 91 0 98 EGFR ml/min/1 73sq m 75 68   CALCIUM mg/dL 8 4 9 0   MAGNESIUM mg/dL  --  1 6     Results from last 7 days   Lab Units 02/04/21  0457 02/03/21  1032   AST U/L 14 18   ALT U/L 25 35   ALK PHOS U/L 86 116   TOTAL PROTEIN g/dL 6 6 8 0   ALBUMIN g/dL 2 6* 3 5   TOTAL BILIRUBIN mg/dL 0 87 0 94     Results from last 7 days   Lab Units 02/04/21  0700 02/03/21  2059 02/03/21  1521   POC GLUCOSE mg/dl 191* 267* 240*     Results from last 7 days   Lab Units 02/04/21  0457 02/03/21  1032   GLUCOSE RANDOM mg/dL 221* 264*     Results from last 7 days   Lab Units 02/03/21  1032   HEMOGLOBIN A1C % 7 7*   EAG mg/dl 174       Results from last 7 days   Lab Units 02/03/21  1032   CK TOTAL U/L 58     Results from last 7 days   Lab Units 02/03/21  1032   PROTIME seconds 12 7   INR  0 97     Results from last 7 days   Lab Units 02/03/21  1702 02/03/21  1317 02/03/21  1032   LACTIC ACID mmol/L 2 0 2 2* 3 1*     Results from last 7 days   Lab Units 02/03/21  1032   LIPASE u/L 35*       Results from last 7 days   Lab Units 02/03/21  0959   CLARITY UA  Clear   COLOR UA  Yellow   SPEC GRAV UA  <=1 005   PH UA  6 0   GLUCOSE UA mg/dl >=1000 (1%)*   KETONES UA mg/dl Negative   BLOOD UA  Small*   PROTEIN UA mg/dl Negative   NITRITE UA  Negative   BILIRUBIN UA  Negative   UROBILINOGEN UA E U /dl 0 2   LEUKOCYTES UA  Elevated glucose may cause decreased leukocyte values   See urine microscopic for [facility] result/*   WBC UA /hpf 0-5   RBC UA /hpf None Seen   BACTERIA UA /hpf Occasional   EPITHELIAL CELLS WET PREP /hpf Occasional       ED Treatment:   Medication Administration from 02/03/2021 0918 to 02/03/2021 1416       Date/Time Order Dose Route Action     02/03/2021 1040 ondansetron (ZOFRAN) injection 4 mg 4 mg Intravenous Given     02/03/2021 1042 morphine (PF) 4 mg/mL injection 4 mg 4 mg Intravenous Given     02/03/2021 1106 tamsulosin (FLOMAX) capsule 0 4 mg 0 4 mg Oral Given     02/03/2021 1106 sulfamethoxazole-trimethoprim (BACTRIM DS) 800-160 mg per tablet 1 tablet 1 tablet Oral Given     02/03/2021 1118 ketorolac (TORADOL) injection 15 mg 15 mg Intravenous Given     02/03/2021 1144 sodium chloride 0 9 % infusion 125 mL/hr Intravenous New Bag     02/03/2021 1330 docusate sodium (COLACE) capsule 100 mg 100 mg Oral Given     02/03/2021 1330 enoxaparin (LOVENOX) subcutaneous injection 40 mg 40 mg Subcutaneous Given     02/03/2021 1328 ciprofloxacin (CIPRO) IVPB (premix in 5% dextrose) 400 mg 200 mL 400 mg Intravenous New Bag        Past Medical History:   Diagnosis Date    High cholesterol        Admitting Diagnosis: Nephrolithiasis [N20 0]  Leukocytosis [D72 829]  Nausea [R11 0]  Flank pain [R10 9]  Left flank pain [R10 9]  Age/Sex: 52 y o  female  Admission Orders:  UP and OOB  SCDS  Blood culture x 2, urine culture    Scheduled Medications:  ciprofloxacin, 400 mg, Intravenous, Q12H  docusate sodium, 100 mg, Oral, BID  enoxaparin, 40 mg, Subcutaneous, Daily  insulin lispro, 1-6 Units, Subcutaneous, TID AC  insulin lispro, 1-6 Units, Subcutaneous, HS  nicotine, 1 patch, Transdermal, Daily  tamsulosin, 0 4 mg, Oral, Daily With Dinner      Continuous IV Infusions:  sodium chloride, 125 mL/hr, Intravenous, Continuous      PRN Meds:  acetaminophen, 650 mg, Oral, Q6H PRN  ketorolac, 15 mg, Intravenous, Q6H PRN  morphine injection, 2 mg, Intravenous, Q4H PRN  ondansetron, 4 mg, Intravenous, Q6H PRN  oxyCODONE-acetaminophen, 1 tablet, Oral, Q4H PRN        IP CONSULT TO UROLOGY  IP CONSULT TO CASE MANAGEMENT    Network Utilization Review Department  ATTENTION: Please call with any questions or concerns to [phone] and carefully listen to the prompts so that you are directed to the right person  All voicemails are confidential   Trudy Candelario all requests for admission clinical reviews, approved or denied determinations and any other requests to dedicated fax number below belonging to the campus where the patient is receiving treatment   List of dedicated fax numbers for the Facilities:  [address] [place] [address] DENIALS (Administrative/Medical Necessity) [phone]   [address]  (Maternity/NICU/Pediatrics) [address] [facility] [facility] [address] [facility] [address] 1941 (  Chely Keita "Johanne" 103) [id number] [address] Calin Mejias [address] [place] [address] [phone]

## 2021-02-04 NOTE — ASSESSMENT & PLAN NOTE
As per CT scan renal protocol shows:Mild left hydronephrosis and perinephric stranding secondary to obstructing 0 4 cm stone in the left mid ureter  Patient does have previous history of renal stone  Continue pain management, IV hydration, Flomax, IV antibiotic  Urology consult appreciated    As per patient, her pain improved, and seems like she passed the stone

## 2021-02-04 NOTE — ASSESSMENT & PLAN NOTE
Improved  Most nephrolithiasis,  CT scan renal protocol patient does 0 4 cm left-sided stone with mild hydronephrosis and straining  Does have elevated WBC lactic acidosis  Continue IV hydration  Continue pain management  Patient already received Bactrim in ER, will continue IV ciprofloxacin  Monitor labs  Urology consult appreciated

## 2021-02-05 ENCOUNTER — HOSPITAL ENCOUNTER (INPATIENT)
Facility: HOSPITAL | Age: 51
LOS: 2 days | Discharge: LEFT BEFORE DISCHARGE | DRG: 872 | End: 2021-02-07
Attending: EMERGENCY MEDICINE | Admitting: FAMILY MEDICINE
Payer: COMMERCIAL

## 2021-02-05 ENCOUNTER — APPOINTMENT (INPATIENT)
Dept: NON INVASIVE DIAGNOSTICS | Facility: HOSPITAL | Age: 51
DRG: 872 | End: 2021-02-05
Payer: COMMERCIAL

## 2021-02-05 DIAGNOSIS — A41.50 GRAM NEGATIVE SEPSIS (HCC): Primary | ICD-10-CM

## 2021-02-05 DIAGNOSIS — N20.0 NEPHROLITHIASIS: ICD-10-CM

## 2021-02-05 DIAGNOSIS — R78.81 BACTEREMIA: ICD-10-CM

## 2021-02-05 PROBLEM — A41.9 SEPSIS (HCC): Status: RESOLVED | Noted: 2021-02-04 | Resolved: 2021-02-05

## 2021-02-05 LAB
ALBUMIN SERPL BCP-MCNC: 2.6 G/DL (ref 3.5–5)
ALP SERPL-CCNC: 107 U/L (ref 46–116)
ALT SERPL W P-5'-P-CCNC: 28 U/L (ref 12–78)
ANION GAP SERPL CALCULATED.3IONS-SCNC: 10 MMOL/L (ref 4–13)
APTT PPP: 31 SECONDS (ref 23–37)
AST SERPL W P-5'-P-CCNC: 14 U/L (ref 5–45)
BASOPHILS # BLD AUTO: 0.02 THOUSANDS/ΜL (ref 0–0.1)
BASOPHILS NFR BLD AUTO: 0 % (ref 0–1)
BILIRUB SERPL-MCNC: 0.67 MG/DL (ref 0.2–1)
BUN SERPL-MCNC: 9 MG/DL (ref 5–25)
CALCIUM ALBUM COR SERPL-MCNC: 9.9 MG/DL (ref 8.3–10.1)
CALCIUM SERPL-MCNC: 8.8 MG/DL (ref 8.3–10.1)
CHLORIDE SERPL-SCNC: 100 MMOL/L (ref 100–108)
CO2 SERPL-SCNC: 24 MMOL/L (ref 21–32)
CREAT SERPL-MCNC: 0.83 MG/DL (ref 0.6–1.3)
EOSINOPHIL # BLD AUTO: 0.08 THOUSAND/ΜL (ref 0–0.61)
EOSINOPHIL NFR BLD AUTO: 1 % (ref 0–6)
ERYTHROCYTE [DISTWIDTH] IN BLOOD BY AUTOMATED COUNT: 12.7 % (ref 11.6–15.1)
GFR SERPL CREATININE-BSD FRML MDRD: 84 ML/MIN/1.73SQ M
GLUCOSE SERPL-MCNC: 195 MG/DL (ref 65–140)
GLUCOSE SERPL-MCNC: 211 MG/DL (ref 65–140)
GLUCOSE SERPL-MCNC: 236 MG/DL (ref 65–140)
HCT VFR BLD AUTO: 40 % (ref 34.8–46.1)
HGB BLD-MCNC: 14 G/DL (ref 11.5–15.4)
IMM GRANULOCYTES # BLD AUTO: 0.08 THOUSAND/UL (ref 0–0.2)
IMM GRANULOCYTES NFR BLD AUTO: 1 % (ref 0–2)
INR PPP: 1.1 (ref 0.84–1.19)
LACTATE SERPL-SCNC: 1 MMOL/L (ref 0.5–2)
LIPASE SERPL-CCNC: 43 U/L (ref 73–393)
LYMPHOCYTES # BLD AUTO: 0.97 THOUSANDS/ΜL (ref 0.6–4.47)
LYMPHOCYTES NFR BLD AUTO: 10 % (ref 14–44)
MCH RBC QN AUTO: 31.7 PG (ref 26.8–34.3)
MCHC RBC AUTO-ENTMCNC: 35 G/DL (ref 31.4–37.4)
MCV RBC AUTO: 91 FL (ref 82–98)
MONOCYTES # BLD AUTO: 0.57 THOUSAND/ΜL (ref 0.17–1.22)
MONOCYTES NFR BLD AUTO: 6 % (ref 4–12)
NEUTROPHILS # BLD AUTO: 8.01 THOUSANDS/ΜL (ref 1.85–7.62)
NEUTS SEG NFR BLD AUTO: 82 % (ref 43–75)
NRBC BLD AUTO-RTO: 0 /100 WBCS
PLATELET # BLD AUTO: 125 THOUSANDS/UL (ref 149–390)
PMV BLD AUTO: 9.4 FL (ref 8.9–12.7)
POTASSIUM SERPL-SCNC: 3.1 MMOL/L (ref 3.5–5.3)
PROCALCITONIN SERPL-MCNC: 3.3 NG/ML
PROT SERPL-MCNC: 7.2 G/DL (ref 6.4–8.2)
PROTHROMBIN TIME: 14 SECONDS (ref 11.6–14.5)
RBC # BLD AUTO: 4.41 MILLION/UL (ref 3.81–5.12)
SODIUM SERPL-SCNC: 134 MMOL/L (ref 136–145)
WBC # BLD AUTO: 9.73 THOUSAND/UL (ref 4.31–10.16)

## 2021-02-05 PROCEDURE — 99284 EMERGENCY DEPT VISIT MOD MDM: CPT | Performed by: EMERGENCY MEDICINE

## 2021-02-05 PROCEDURE — 93306 TTE W/DOPPLER COMPLETE: CPT

## 2021-02-05 PROCEDURE — 84145 PROCALCITONIN (PCT): CPT | Performed by: FAMILY MEDICINE

## 2021-02-05 PROCEDURE — 99285 EMERGENCY DEPT VISIT HI MDM: CPT

## 2021-02-05 PROCEDURE — 85025 COMPLETE CBC W/AUTO DIFF WBC: CPT | Performed by: EMERGENCY MEDICINE

## 2021-02-05 PROCEDURE — 83605 ASSAY OF LACTIC ACID: CPT | Performed by: EMERGENCY MEDICINE

## 2021-02-05 PROCEDURE — 85610 PROTHROMBIN TIME: CPT | Performed by: EMERGENCY MEDICINE

## 2021-02-05 PROCEDURE — 36415 COLL VENOUS BLD VENIPUNCTURE: CPT | Performed by: EMERGENCY MEDICINE

## 2021-02-05 PROCEDURE — 80053 COMPREHEN METABOLIC PANEL: CPT | Performed by: EMERGENCY MEDICINE

## 2021-02-05 PROCEDURE — 85730 THROMBOPLASTIN TIME PARTIAL: CPT | Performed by: EMERGENCY MEDICINE

## 2021-02-05 PROCEDURE — 83690 ASSAY OF LIPASE: CPT | Performed by: EMERGENCY MEDICINE

## 2021-02-05 PROCEDURE — 93306 TTE W/DOPPLER COMPLETE: CPT | Performed by: INTERNAL MEDICINE

## 2021-02-05 PROCEDURE — 82948 REAGENT STRIP/BLOOD GLUCOSE: CPT

## 2021-02-05 PROCEDURE — 99223 1ST HOSP IP/OBS HIGH 75: CPT | Performed by: FAMILY MEDICINE

## 2021-02-05 PROCEDURE — 87040 BLOOD CULTURE FOR BACTERIA: CPT | Performed by: EMERGENCY MEDICINE

## 2021-02-05 RX ORDER — OXYCODONE HYDROCHLORIDE AND ACETAMINOPHEN 5; 325 MG/1; MG/1
1 TABLET ORAL EVERY 4 HOURS PRN
Status: DISCONTINUED | OUTPATIENT
Start: 2021-02-05 | End: 2021-02-07 | Stop reason: HOSPADM

## 2021-02-05 RX ORDER — LEVOFLOXACIN 5 MG/ML
750 INJECTION, SOLUTION INTRAVENOUS EVERY 24 HOURS
Status: DISCONTINUED | OUTPATIENT
Start: 2021-02-06 | End: 2021-02-07 | Stop reason: HOSPADM

## 2021-02-05 RX ORDER — POTASSIUM CHLORIDE 20 MEQ/1
40 TABLET, EXTENDED RELEASE ORAL ONCE
Status: COMPLETED | OUTPATIENT
Start: 2021-02-05 | End: 2021-02-05

## 2021-02-05 RX ORDER — MAGNESIUM HYDROXIDE/ALUMINUM HYDROXICE/SIMETHICONE 120; 1200; 1200 MG/30ML; MG/30ML; MG/30ML
30 SUSPENSION ORAL EVERY 6 HOURS PRN
Status: DISCONTINUED | OUTPATIENT
Start: 2021-02-05 | End: 2021-02-07 | Stop reason: HOSPADM

## 2021-02-05 RX ORDER — SODIUM CHLORIDE 9 MG/ML
100 INJECTION, SOLUTION INTRAVENOUS CONTINUOUS
Status: DISCONTINUED | OUTPATIENT
Start: 2021-02-05 | End: 2021-02-07 | Stop reason: HOSPADM

## 2021-02-05 RX ORDER — ACETAMINOPHEN 325 MG/1
650 TABLET ORAL EVERY 6 HOURS PRN
Status: DISCONTINUED | OUTPATIENT
Start: 2021-02-05 | End: 2021-02-07 | Stop reason: HOSPADM

## 2021-02-05 RX ORDER — TAMSULOSIN HYDROCHLORIDE 0.4 MG/1
0.4 CAPSULE ORAL
Status: DISCONTINUED | OUTPATIENT
Start: 2021-02-05 | End: 2021-02-07 | Stop reason: HOSPADM

## 2021-02-05 RX ORDER — LEVOFLOXACIN 5 MG/ML
500 INJECTION, SOLUTION INTRAVENOUS ONCE
Status: COMPLETED | OUTPATIENT
Start: 2021-02-05 | End: 2021-02-05

## 2021-02-05 RX ORDER — LORAZEPAM 0.5 MG/1
0.5 TABLET ORAL EVERY 6 HOURS PRN
Status: DISCONTINUED | OUTPATIENT
Start: 2021-02-05 | End: 2021-02-07 | Stop reason: HOSPADM

## 2021-02-05 RX ADMIN — SODIUM CHLORIDE 100 ML/HR: 0.9 INJECTION, SOLUTION INTRAVENOUS at 14:32

## 2021-02-05 RX ADMIN — OXYCODONE HYDROCHLORIDE AND ACETAMINOPHEN 1 TABLET: 5; 325 TABLET ORAL at 15:37

## 2021-02-05 RX ADMIN — ACETAMINOPHEN 650 MG: 325 TABLET ORAL at 19:25

## 2021-02-05 RX ADMIN — ENOXAPARIN SODIUM 40 MG: 40 INJECTION SUBCUTANEOUS at 15:37

## 2021-02-05 RX ADMIN — TAMSULOSIN HYDROCHLORIDE 0.4 MG: 0.4 CAPSULE ORAL at 15:37

## 2021-02-05 RX ADMIN — SODIUM CHLORIDE 100 ML/HR: 0.9 INJECTION, SOLUTION INTRAVENOUS at 15:37

## 2021-02-05 RX ADMIN — SODIUM CHLORIDE 2000 ML: 0.9 INJECTION, SOLUTION INTRAVENOUS at 12:52

## 2021-02-05 RX ADMIN — INSULIN LISPRO 2 UNITS: 100 INJECTION, SOLUTION INTRAVENOUS; SUBCUTANEOUS at 15:39

## 2021-02-05 RX ADMIN — INSULIN LISPRO 2 UNITS: 100 INJECTION, SOLUTION INTRAVENOUS; SUBCUTANEOUS at 20:51

## 2021-02-05 RX ADMIN — LEVOFLOXACIN 500 MG: 5 INJECTION, SOLUTION INTRAVENOUS at 12:27

## 2021-02-05 RX ADMIN — POTASSIUM CHLORIDE 40 MEQ: 1500 TABLET, EXTENDED RELEASE ORAL at 20:51

## 2021-02-05 NOTE — PLAN OF CARE
Problem: METABOLIC, FLUID AND ELECTROLYTES - ADULT  Goal: Electrolytes maintained within normal limits  Description: INTERVENTIONS:  - Monitor labs and assess patient for signs and symptoms of electrolyte imbalances  - Administer electrolyte replacement as ordered  - Monitor response to electrolyte replacements, including repeat lab results as appropriate  - Instruct patient on fluid and nutrition as appropriate  Outcome: Progressing  Goal: Fluid balance maintained  Description: INTERVENTIONS:  - Monitor labs   - Monitor I/O and WT  - Instruct patient on fluid and nutrition as appropriate  - Assess for signs & symptoms of volume excess or deficit  Outcome: Progressing     Problem: PAIN - ADULT  Goal: Verbalizes/displays adequate comfort level or baseline comfort level  Description: Interventions:  - Encourage patient to monitor pain and request assistance  - Assess pain using appropriate pain scale  - Administer analgesics based on type and severity of pain and evaluate response  - Implement non-pharmacological measures as appropriate and evaluate response  - Consider cultural and social influences on pain and pain management  - Notify physician/advanced practitioner if interventions unsuccessful or patient reports new pain  Outcome: Progressing     Problem: INFECTION - ADULT  Goal: Absence or prevention of progression during hospitalization  Description: INTERVENTIONS:  - Assess and monitor for signs and symptoms of infection  - Monitor lab/diagnostic results  - Monitor all insertion sites, i e  indwelling lines, tubes, and drains  - Monitor endotracheal if appropriate and nasal secretions for changes in amount and color  - [place] appropriate cooling/warming therapies per order  - Administer medications as ordered  - Instruct and encourage patient and family to use good hand hygiene technique  - Identify and instruct in appropriate isolation precautions for identified infection/condition  Outcome: Progressing     Problem: SAFETY ADULT  Goal: Patient will remain free of falls  Description: INTERVENTIONS:  - Assess patient frequently for physical needs  -  Identify cognitive and physical deficits and behaviors that affect risk of falls    -  [place] fall precautions as indicated by assessment   - Educate patient/family on patient safety including physical limitations  - Instruct patient to call for assistance with activity based on assessment  - Modify environment to reduce risk of injury  - Consider OT/PT consult to assist with strengthening/mobility  Outcome: Progressing  Goal: Maintain or return to baseline ADL function  Description: INTERVENTIONS:  -  Assess patient's ability to carry out ADLs; assess patient's baseline for ADL function and identify physical deficits which impact ability to perform ADLs (bathing, care of mouth/teeth, toileting, grooming, dressing, etc )  - Assess/evaluate cause of self-care deficits   - Assess range of motion  - Assess patient's mobility; develop plan if impaired  - Assess patient's need for assistive devices and provide as appropriate  - Encourage maximum independence but intervene and supervise when necessary  - Involve family in performance of ADLs  - Assess for home care needs following discharge   - Consider OT consult to assist with ADL evaluation and planning for discharge  - Provide patient education as appropriate  Outcome: Progressing  Goal: Maintain or return mobility status to optimal level  Description: INTERVENTIONS:  - Assess patient's baseline mobility status (ambulation, transfers, stairs, etc )    - Identify cognitive and physical deficits and behaviors that affect mobility  - Identify mobility aids required to assist with transfers and/or ambulation (gait belt, sit-to-stand, lift, walker, cane, etc )  - [place] fall precautions as indicated by assessment  - Record patient progress and toleration of activity level on Mobility SBAR; progress patient to next Phase/Stage  - Instruct patient to call for assistance with activity based on assessment  - Consider rehabilitation consult to assist with strengthening/weightbearing, etc   Outcome: Progressing     Problem: DISCHARGE PLANNING  Goal: Discharge to home or other facility with appropriate resources  Description: INTERVENTIONS:  - Identify barriers to discharge w/patient and caregiver  - Arrange for needed discharge resources and transportation as appropriate  - Identify discharge learning needs (meds, wound care, etc )  - Arrange for interpretive services to assist at discharge as needed  - Refer to Case Management Department for coordinating discharge planning if the patient needs post-hospital services based on physician/advanced practitioner order or complex needs related to functional status, cognitive ability, or social support system  Outcome: Progressing     Problem: Knowledge Deficit  Goal: Patient/family/caregiver demonstrates understanding of disease process, treatment plan, medications, and discharge instructions  Description: Complete learning assessment and assess knowledge base  Interventions:  - Provide teaching at level of understanding  - Provide teaching via preferred learning methods  Outcome: Progressing     Problem: Potential for Falls  Goal: Patient will remain free of falls  Description: INTERVENTIONS:  - Assess patient frequently for physical needs  -  Identify cognitive and physical deficits and behaviors that affect risk of falls    -  [place] fall precautions as indicated by assessment   - Educate patient/family on patient safety including physical limitations  - Instruct patient to call for assistance with activity based on assessment  - Modify environment to reduce risk of injury  - Consider OT/PT consult to assist with strengthening/mobility  Outcome: Progressing

## 2021-02-05 NOTE — ASSESSMENT & PLAN NOTE
Lab Results   Component Value Date    HGBA1C 7 7 (H) 02/03/2021       Recent Labs     02/03/21  1521 02/03/21 2059 02/04/21  0700   POCGLU 240* 267* 191*       Blood Sugar Average: Last 72 hrs:   diabetic diet  Continue sliding scale insulin

## 2021-02-05 NOTE — H&P
H&P- Dale Mckeon 1970, 52 y o  female MRN: [id number]    Unit/Bed#: ED 11 Encounter: [id number]    Primary Care Provider: BRETT Grimes   Date and time admitted to hospital: 2/5/2021 12:02 PM        * Bacteremia  Assessment & Plan  Blood culture shows Gram-negative rods, urine culture shows Klebsiella most likely the source of infection  Patient recently admitted in the hospital due to nephrolithiasis with mild hydronephrosis with perinephric straight  Initially patient was receiving ceftriaxone, that patient has AMA and prescribed levofloxacin  Patient callback for blood culture report and came to the hospital   Continue IV levofloxacin  Treated fever curve  Follow labs, follow repeat blood culture    Sepsis (HCC)-resolved as of 2/5/2021  Assessment & Plan  Secondary to bacteremia as outlined below  Patient is tachypneic,    Nephrolithiasis  Assessment & Plan  Patient recently admitted in this hospital inside [facility]  Continue IV hydration, pain management    Type 2 diabetes mellitus without complication, without long-term current use of insulin [facility])  Assessment & Plan  Lab Results   Component Value Date    HGBA1C 7 7 (H) 02/03/2021       Recent Labs     02/03/21  1521 02/03/21  2059 02/04/21  0700   POCGLU 240* 267* 191*       Blood Sugar Average: Last 72 hrs:   diabetic diet  Continue sliding scale insulin          VTE Prophylaxis: Enoxaparin (Lovenox)  / sequential compression device   Code Status:  Full code    Discussion with family:  Yes with patient    Anticipated Length of Stay:  Patient will be admitted on an Inpatient basis with an anticipated length of stay of  > 2 midnights  Justification for Hospital Stay:  To monitor above condition    Total Time for Visit, including Counseling / Coordination of Care: 45 minutes  Greater than 50% of this total time spent on direct patient counseling and coordination of care      Chief Complaint:   bacteremia    History of Present Illness:    Cecilia Shaw is a 52 y o  female who presents with bacteremia  Patient recently admitted in this hospital, signed AMA yesterday due to nephrolithiasis, perinephric strain, suspected pyelonephritis with fever  Patient initially receiving IV ciprofloxacin, and be admitted med as well as with the fluid  Patient that signed AMA  This morning, patient's blood culture came back Gram-negative rods  Contracted with patient and advised to come back to hospital for IV antibiotic  As per patient she was spiking fever overnight, but her pain is improving, denies any nausea, vomiting, diarrhea  Review of Systems:    Review of Systems   Constitutional: Positive for activity change  Negative for appetite change, chills, diaphoresis, fatigue, fever and unexpected weight change  HENT: Negative for congestion, trouble swallowing and voice change  Respiratory: Negative for apnea, cough, choking, chest tightness, shortness of breath and wheezing  Cardiovascular: Negative for chest pain, palpitations and leg swelling  Gastrointestinal: Negative for abdominal distention, abdominal pain, anal bleeding, blood in stool, diarrhea and nausea  Genitourinary: Negative for difficulty urinating, flank pain, frequency and genital sores  Musculoskeletal: Negative for arthralgias, back pain and gait problem  Skin: Negative for color change, pallor, rash and wound  Neurological: Negative for dizziness, facial asymmetry and headaches  Hematological: Negative for adenopathy  Psychiatric/Behavioral: Negative for agitation and behavioral problems  All other systems reviewed and are negative        Past Medical and Surgical History:     Past Medical History:   Diagnosis Date    High cholesterol        Past Surgical History:   Procedure Laterality Date    APPENDECTOMY       SECTION      DILATION AND CURETTAGE OF UTERUS         Meds/Allergies:    Prior to Admission medications    Medication Sig Start Date End Date Taking? Authorizing Provider   ibuprofen (MOTRIN) 600 mg tablet Take 600 mg by mouth every 6 (six) hours as needed for mild pain or headaches   Yes Historical Provider, MD   ondansetron (ZOFRAN) 4 mg tablet Take 1 tablet (4 mg total) by mouth every 8 (eight) hours as needed for nausea or vomiting 2/4/21  Yes Katey Young MD   oxyCODONE-acetaminophen (PERCOCET) 5-325 mg per tablet Take 1 tablet by mouth every 4 (four) hours as needed for moderate painMax Daily Amount: 6 tablets 2/4/21  Yes Katey Young MD   tamsulosin (FLOMAX) 0 4 mg Take 1 capsule (0 4 mg total) by mouth daily with dinner for 15 days 2/4/21 2/19/21 Yes Katey Young MD   levofloxacin (LEVAQUIN) 750 mg tablet Take 1 tablet (750 mg total) by mouth every 24 hours for 5 days 2/4/21 2/5/21 Yes Katey Young MD   nicotine (NICODERM CQ) 21 mg/24 hr TD 24 hr patch Place 1 patch on the skin daily  Patient not taking: Reported on 2/5/2021 2/5/21   Katey Young MD     I have reviewed home medications with patient personally  Allergies:    Allergies   Allergen Reactions    Amoxicillin GI Intolerance    Lipitor [Atorvastatin] Chest Pain       Social History:     Marital Status: /Civil Union   Occupation:  Unknown  Patient Pre-hospital Living Situation:  At home  Patient Pre-hospital Level of Mobility:  Independent  Patient Pre-hospital Diet Restrictions:  Cardiac/diabetic diet  Substance Use History:   Social History     Substance and Sexual Activity   Alcohol Use Never    Frequency: Never     Social History     Tobacco Use   Smoking Status Current Every Day Smoker    Packs/day: 1 00   Smokeless Tobacco Never Used     Social History     Substance and Sexual Activity   Drug Use Never       Family History:    non-contributory    Physical Exam:     Vitals:   Blood Pressure: 137/72 (02/05/21 1300)  Pulse: (!) 113 (02/05/21 1300)  Temperature: 98 6 °F (37 °C) (02/05/21 1206)  Temp Source: Temporal (02/05/21 1206)  Respirations: 18 (02/05/21 1228)  Height: 5' 3" (160 cm) (02/05/21 1206)  Weight - Scale: 102 kg (225 lb) (02/05/21 1206)  SpO2: 95 % (02/05/21 1300)    Physical Exam  Vitals signs and nursing note reviewed  Exam conducted with a chaperone present  Constitutional:       Appearance: She is not diaphoretic  HENT:      Nose: No congestion  Mouth/Throat:      Mouth: Mucous membranes are moist       Pharynx: No oropharyngeal exudate  Eyes:      General: No scleral icterus  Conjunctiva/sclera: Conjunctivae normal       Pupils: Pupils are equal, round, and reactive to light  Neck:      Musculoskeletal: Normal range of motion  Cardiovascular:      Rate and Rhythm: Normal rate  Pulses: Normal pulses  Heart sounds: No gallop  Pulmonary:      Effort: Pulmonary effort is normal  No respiratory distress  Breath sounds: No stridor  No wheezing or rhonchi  Abdominal:      General: Abdomen is flat  Bowel sounds are normal  There is no distension  Palpations: There is no mass  Tenderness: There is no abdominal tenderness  Hernia: No hernia is present  Musculoskeletal: Normal range of motion  Right lower leg: No edema  Lymphadenopathy:      Cervical: No cervical adenopathy  Skin:     General: Skin is warm  Capillary Refill: Capillary refill takes less than 2 seconds  Coloration: Skin is not pale  Findings: No bruising or lesion  Neurological:      General: No focal deficit present  Mental Status: She is alert and oriented to person, place, and time  Cranial Nerves: No cranial nerve deficit  Motor: No weakness  Psychiatric:         Mood and Affect: Mood normal            Additional Data:     Lab Results: I have personally reviewed pertinent reports        Results from last 7 days   Lab Units 02/05/21  1214   WBC Thousand/uL 9 73   HEMOGLOBIN g/dL 14 0   HEMATOCRIT % 40 0   PLATELETS Thousands/uL 125*   NEUTROS PCT % 82*   LYMPHS PCT % 10*   MONOS PCT % 6   EOS PCT % 1     Results from last 7 days   Lab Units 02/05/21  1214   SODIUM mmol/L 134*   POTASSIUM mmol/L 3 1*   CHLORIDE mmol/L 100   CO2 mmol/L 24   BUN mg/dL 9   CREATININE mg/dL 0 83   ANION GAP mmol/L 10   CALCIUM mg/dL 8 8   ALBUMIN g/dL 2 6*   TOTAL BILIRUBIN mg/dL 0 67   ALK PHOS U/L 107   ALT U/L 28   AST U/L 14   GLUCOSE RANDOM mg/dL 236*     Results from last 7 days   Lab Units 02/05/21  1214   INR  1 10     Results from last 7 days   Lab Units 02/04/21  0700 02/03/21  2059 02/03/21  1521   POC GLUCOSE mg/dl 191* 267* 240*     Results from last 7 days   Lab Units 02/03/21  1032   HEMOGLOBIN A1C % 7 7*     Results from last 7 days   Lab Units 02/05/21  1214 02/03/21  2241 02/03/21  1702 02/03/21  1317 02/03/21  1032   LACTIC ACID mmol/L 1 0  --  2 0 2 2* 3 1*   PROCALCITONIN ng/ml  --  7 05*  --   --   --        Imaging: I have personally reviewed pertinent reports  No orders to display       EKG, Pathology, and Other Studies Reviewed on Admission:   · EKG: none    Allscripts / Epic Records Reviewed: Yes     ** Please Note: This note has been constructed using a voice recognition system   **

## 2021-02-05 NOTE — PLAN OF CARE
Problem: METABOLIC, FLUID AND ELECTROLYTES - ADULT  Goal: Electrolytes maintained within normal limits  Description: INTERVENTIONS:  - Monitor labs and assess patient for signs and symptoms of electrolyte imbalances  - Administer electrolyte replacement as ordered  - Monitor response to electrolyte replacements, including repeat lab results as appropriate  - Instruct patient on fluid and nutrition as appropriate  Outcome: Not Progressing  Goal: Fluid balance maintained  Description: INTERVENTIONS:  - Monitor labs   - Monitor I/O and WT  - Instruct patient on fluid and nutrition as appropriate  - Assess for signs & symptoms of volume excess or deficit  Outcome: Not Progressing     Problem: PAIN - ADULT  Goal: Verbalizes/displays adequate comfort level or baseline comfort level  Description: Interventions:  - Encourage patient to monitor pain and request assistance  - Assess pain using appropriate pain scale  - Administer analgesics based on type and severity of pain and evaluate response  - Implement non-pharmacological measures as appropriate and evaluate response  - Consider cultural and social influences on pain and pain management  - Notify physician/advanced practitioner if interventions unsuccessful or patient reports new pain  Outcome: Not Progressing     Problem: INFECTION - ADULT  Goal: Absence or prevention of progression during hospitalization  Description: INTERVENTIONS:  - Assess and monitor for signs and symptoms of infection  - Monitor lab/diagnostic results  - Monitor all insertion sites, i e  indwelling lines, tubes, and drains  - Monitor endotracheal if appropriate and nasal secretions for changes in amount and color  - [place] appropriate cooling/warming therapies per order  - Administer medications as ordered  - Instruct and encourage patient and family to use good hand hygiene technique  - Identify and instruct in appropriate isolation precautions for identified infection/condition  Outcome: Not Progressing     Problem: SAFETY ADULT  Goal: Patient will remain free of falls  Description: INTERVENTIONS:  - Assess patient frequently for physical needs  -  Identify cognitive and physical deficits and behaviors that affect risk of falls    -  [place] fall precautions as indicated by assessment   - Educate patient/family on patient safety including physical limitations  - Instruct patient to call for assistance with activity based on assessment  - Modify environment to reduce risk of injury  - Consider OT/PT consult to assist with strengthening/mobility  Outcome: Not Progressing  Goal: Maintain or return to baseline ADL function  Description: INTERVENTIONS:  -  Assess patient's ability to carry out ADLs; assess patient's baseline for ADL function and identify physical deficits which impact ability to perform ADLs (bathing, care of mouth/teeth, toileting, grooming, dressing, etc )  - Assess/evaluate cause of self-care deficits   - Assess range of motion  - Assess patient's mobility; develop plan if impaired  - Assess patient's need for assistive devices and provide as appropriate  - Encourage maximum independence but intervene and supervise when necessary  - Involve family in performance of ADLs  - Assess for home care needs following discharge   - Consider OT consult to assist with ADL evaluation and planning for discharge  - Provide patient education as appropriate  Outcome: Not Progressing  Goal: Maintain or return mobility status to optimal level  Description: INTERVENTIONS:  - Assess patient's baseline mobility status (ambulation, transfers, stairs, etc )    - Identify cognitive and physical deficits and behaviors that affect mobility  - Identify mobility aids required to assist with transfers and/or ambulation (gait belt, sit-to-stand, lift, walker, cane, etc )  - [place] fall precautions as indicated by assessment  - Record patient progress and toleration of activity level on Mobility SBAR; progress patient to next Phase/Stage  - Instruct patient to call for assistance with activity based on assessment  - Consider rehabilitation consult to assist with strengthening/weightbearing, etc   Outcome: Not Progressing     Problem: DISCHARGE PLANNING  Goal: Discharge to home or other facility with appropriate resources  Description: INTERVENTIONS:  - Identify barriers to discharge w/patient and caregiver  - Arrange for needed discharge resources and transportation as appropriate  - Identify discharge learning needs (meds, wound care, etc )  - Arrange for interpretive services to assist at discharge as needed  - Refer to Case Management Department for coordinating discharge planning if the patient needs post-hospital services based on physician/advanced practitioner order or complex needs related to functional status, cognitive ability, or social support system  Outcome: Not Progressing     Problem: Knowledge Deficit  Goal: Patient/family/caregiver demonstrates understanding of disease process, treatment plan, medications, and discharge instructions  Description: Complete learning assessment and assess knowledge base    Interventions:  - Provide teaching at level of understanding  - Provide teaching via preferred learning methods  Outcome: Not Progressing

## 2021-02-05 NOTE — ASSESSMENT & PLAN NOTE
Blood culture shows Gram-negative rods, urine culture shows Klebsiella most likely the source of infection  Patient recently admitted in the hospital due to nephrolithiasis with mild hydronephrosis with perinephric straight  Initially patient was receiving ceftriaxone, that patient has AMA and prescribed levofloxacin  Patient callback for blood culture report and came to the hospital   Continue IV levofloxacin  Treated fever curve  Follow labs, follow repeat blood culture

## 2021-02-05 NOTE — ED PROVIDER NOTES
History  Chief Complaint   Patient presents with    Abdominal Pain     Pt reports LLQ pain, was admitted for kidney stones 2/2/21, pt signed out AMA, Admission doc called pt today about positive blodo cultures, told to come back to be readmitted     Patient was admitted to this facility for left ureteral stone and UTI  Sign out AMA last night  Blood cultures were positive for Gram-negative rods  Patient was called and is back for IV antibiotics  Had fevers last night  Complains of abdominal bloating  Some nausea but no vomiting  History provided by:  Patient   used: No    Abdominal Pain  Pain location:  LLQ  Pain quality: aching    Pain radiates to:  L flank  Pain severity:  Mild  Onset quality:  Gradual  Timing:  Constant  Progression:  Unchanged  Chronicity:  New  Context: not diet changes, not previous surgeries and not sick contacts    Relieved by:  Nothing  Worsened by:  Nothing  Ineffective treatments:  None tried  Associated symptoms: fever    Associated symptoms: no anorexia, no chest pain, no chills, no cough, no diarrhea, no dysuria, no fatigue, no nausea, no shortness of breath, no sore throat and no vaginal bleeding        Prior to Admission Medications   Prescriptions Last Dose Informant Patient Reported?  Taking?   ibuprofen (MOTRIN) 600 mg tablet 2/5/2021 at Unknown time Self Yes Yes   Sig: Take 600 mg by mouth every 6 (six) hours as needed for mild pain or headaches   levofloxacin (LEVAQUIN) 750 mg tablet 2/4/2021 at Unknown time  No Yes   Sig: Take 1 tablet (750 mg total) by mouth every 24 hours for 5 days   nicotine (NICODERM CQ) 21 mg/24 hr TD 24 hr patch Not Taking at Unknown time  No No   Sig: Place 1 patch on the skin daily   Patient not taking: Reported on 2/5/2021   ondansetron (ZOFRAN) 4 mg tablet Past Week at Unknown time  No Yes   Sig: Take 1 tablet (4 mg total) by mouth every 8 (eight) hours as needed for nausea or vomiting   oxyCODONE-acetaminophen (PERCOCET) 5-325 mg per tablet Past Week at Unknown time  No Yes   Sig: Take 1 tablet by mouth every 4 (four) hours as needed for moderate painMax Daily Amount: 6 tablets   tamsulosin (FLOMAX) 0 4 mg Past Week at Unknown time  No Yes   Sig: Take 1 capsule (0 4 mg total) by mouth daily with dinner for 15 days      Facility-Administered Medications: None       Past Medical History:   Diagnosis Date    High cholesterol        Past Surgical History:   Procedure Laterality Date    APPENDECTOMY       SECTION      DILATION AND CURETTAGE OF UTERUS         History reviewed  No pertinent family history  I have reviewed and agree with the history as documented  E-Cigarette/Vaping    E-Cigarette Use Never User      E-Cigarette/Vaping Substances     Social History     Tobacco Use    Smoking status: Current Every Day Smoker     Packs/day:  00    Smokeless tobacco: Never Used   Substance Use Topics    Alcohol use: Never     Frequency: Never    Drug use: Never       Review of Systems   Constitutional: Positive for fever  Negative for chills, diaphoresis and fatigue  HENT: Negative for congestion, ear discharge, ear pain and sore throat  Eyes: Negative for pain, discharge and itching  Respiratory: Negative for cough, shortness of breath and wheezing  Cardiovascular: Negative for chest pain, palpitations and leg swelling  Gastrointestinal: Positive for abdominal pain  Negative for anorexia, diarrhea and nausea  Endocrine: Negative for polydipsia and polyphagia  Genitourinary: Negative for dysuria, frequency and vaginal bleeding  Musculoskeletal: Negative for back pain and myalgias  Skin: Negative for color change and rash  Neurological: Negative for dizziness, light-headedness and headaches  Hematological: Does not bruise/bleed easily  Psychiatric/Behavioral: Negative for agitation, behavioral problems and confusion  All other systems reviewed and are negative        Physical Exam  Physical Exam  Vitals signs and nursing note reviewed  Constitutional:       Appearance: She is well-developed  HENT:      Head: Normocephalic and atraumatic  Eyes:      Pupils: Pupils are equal, round, and reactive to light  Neck:      Musculoskeletal: Normal range of motion and neck supple  Trachea: No tracheal deviation  Cardiovascular:      Rate and Rhythm: Normal rate and regular rhythm  Heart sounds: Normal heart sounds  Pulmonary:      Effort: Pulmonary effort is normal       Breath sounds: Normal breath sounds  Abdominal:      General: Bowel sounds are normal  There is no distension  Palpations: Abdomen is soft  Tenderness: There is abdominal tenderness in the left lower quadrant  Musculoskeletal:         General: No tenderness or deformity  Skin:     General: Skin is warm and dry  Capillary Refill: Capillary refill takes less than 2 seconds  Neurological:      Mental Status: She is alert and oriented to person, place, and time  Cranial Nerves: No cranial nerve deficit           Vital Signs  ED Triage Vitals [02/05/21 1206]   Temperature Pulse Respirations Blood Pressure SpO2   98 6 °F (37 °C) (!) 120 19 163/89 94 %      Temp Source Heart Rate Source Patient Position - Orthostatic VS BP Location FiO2 (%)   Temporal Monitor Sitting Left arm --      Pain Score       4           Vitals:    02/05/21 1206 02/05/21 1228 02/05/21 1230 02/05/21 1245   BP: 163/89 (!) 131/104 (!) 131/104 135/72   Pulse: (!) 120 (!) 117 (!) 116 (!) 114   Patient Position - Orthostatic VS: Sitting            Visual Acuity      ED Medications  Medications   levofloxacin (LEVAQUIN) IVPB (premix in dextrose) 500 mg 100 mL (500 mg Intravenous New Bag 2/5/21 1227)   sodium chloride 0 9 % bolus 2,000 mL (2,000 mL Intravenous New Bag 2/5/21 1252)       Diagnostic Studies  Results Reviewed     Procedure Component Value Units Date/Time    Lactic acid [[id number]]  (Normal) Collected: 02/05/21 1214    Lab Status: Final result Specimen: Blood from Arm, Left Updated: 02/05/21 1244     LACTIC ACID 1 0 mmol/L     Narrative:      Result may be elevated if tourniquet was used during collection      Comprehensive metabolic panel [[id number]]  (Abnormal) Collected: 02/05/21 1214    Lab Status: Final result Specimen: Blood from Arm, Left Updated: 02/05/21 1239     Sodium 134 mmol/L      Potassium 3 1 mmol/L      Chloride 100 mmol/L      CO2 24 mmol/L      ANION GAP 10 mmol/L      BUN 9 mg/dL      Creatinine 0 83 mg/dL      Glucose 236 mg/dL      Calcium 8 8 mg/dL      Corrected Calcium 9 9 mg/dL      AST 14 U/L      ALT 28 U/L      Alkaline Phosphatase 107 U/L      Total Protein 7 2 g/dL      Albumin 2 6 g/dL      Total Bilirubin 0 67 mg/dL      eGFR 84 ml/min/1 73sq m     Narrative:      [place] guidelines for Chronic Kidney Disease (CKD):     Stage 1 with normal or high GFR (GFR > 90 mL/min/1 73 square meters)    Stage 2 Mild CKD (GFR = 60-89 mL/min/1 73 square meters)    Stage 3A Moderate CKD (GFR = 45-59 mL/min/1 73 square meters)    Stage 3B Moderate CKD (GFR = 30-44 mL/min/1 73 square meters)    Stage 4 Severe CKD (GFR = 15-29 mL/min/1 73 square meters)    Stage 5 End Stage CKD (GFR <15 mL/min/1 73 square meters)  Note: GFR calculation is accurate only with a steady state creatinine    Lipase [[id number]]  (Abnormal) Collected: 02/05/21 1214    Lab Status: Final result Specimen: Blood from Arm, Left Updated: 02/05/21 1239     Lipase 43 u/L     Protime-INR [[id number]]  (Normal) Collected: 02/05/21 1214    Lab Status: Final result Specimen: Blood from Arm, Left Updated: 02/05/21 1236     Protime 14 0 seconds      INR 1 10    APTT [[id number]]  (Normal) Collected: 02/05/21 1214    Lab Status: Final result Specimen: Blood from Arm, Left Updated: 02/05/21 1236     PTT 31 seconds     CBC and differential [[id number]]  (Abnormal) Collected: 02/05/21 1214    Lab Status: Final result Specimen: Blood from Arm, Left Updated: 02/05/21 1226     WBC 9 73 Thousand/uL      RBC 4 41 Million/uL      Hemoglobin 14 0 g/dL      Hematocrit 40 0 %      MCV 91 fL      MCH 31 7 pg      MCHC 35 0 g/dL      RDW 12 7 %      MPV 9 4 fL      Platelets 876 Thousands/uL      nRBC 0 /100 WBCs      Neutrophils Relative 82 %      Immat GRANS % 1 %      Lymphocytes Relative 10 %      Monocytes Relative 6 %      Eosinophils Relative 1 %      Basophils Relative 0 %      Neutrophils Absolute 8 01 Thousands/µL      Immature Grans Absolute 0 08 Thousand/uL      Lymphocytes Absolute 0 97 Thousands/µL      Monocytes Absolute 0 57 Thousand/µL      Eosinophils Absolute 0 08 Thousand/µL      Basophils Absolute 0 02 Thousands/µL     Blood culture #1 [[id number]] Collected: 02/05/21 1214    Lab Status: In process Specimen: Blood from Arm, Left Updated: 02/05/21 1221    Blood culture #2 [[id number]] Collected: 02/05/21 1214    Lab Status: In process Specimen: Blood from Arm, Right Updated: 02/05/21 1220                 No orders to display              Procedures  Procedures         ED Course  ED Course as of Feb 05 1259   Fri Feb 05, 2021   1237 Patient is morbidly obese with a BMI greater than 30  Will use the ideal body weight for the fluid bolus  SBIRT 20yo+      Most Recent Value   SBIRT (24 yo +)   In order to provide better care to our patients, we are screening all of our patients for alcohol and drug use  Would it be okay to ask you these screening questions?   No Filed at: 02/05/2021 1221                    MDM    Disposition  Final diagnoses:   Gram negative sepsis [facility])     Time reflects when diagnosis was documented in both MDM as applicable and the Disposition within this note     Time User Action Codes Description Comment    2/5/2021 12:12 PM Yesenia Lucio Junior Add [A41 50] Gram negative sepsis [facility])       ED Disposition     ED Disposition Condition Date/Time Comment    Admit Stable Fri Feb 5, 2021 12:13 PM Case was discussed with Dr Karina Nobles and the patient's admission status was agreed to be to the service of Dr Karina Faustin    None         Patient's Medications   Discharge Prescriptions    No medications on file     No discharge procedures on file      PDMP Review     None          ED Provider  Electronically Signed by           Verena Walter MD  02/05/21 [address] [place] [address] Jennifer Colin MD  02/05/21 [address] [place] [address] Jennifer Colin MD  02/05/21 [facility] Jennifer Colin MD  02/05/21 1672

## 2021-02-05 NOTE — RESPIRATORY THERAPY NOTE
RT Protocol Note  Lexa Wilson 52 y o  female MRN: [id number]  Unit/Bed#: -01 Encounter: [id number]    Assessment    Principal Problem:    Bacteremia  Active Problems:    Nephrolithiasis    Type 2 diabetes mellitus without complication, without long-term current use of insulin (HCC)      Home Pulmonary Medications:    Home Devices/Therapy: (none)    Past Medical History:   Diagnosis Date    High cholesterol      Social History     Socioeconomic History    Marital status: /Civil Union     Spouse name: None    Number of children: None    Years of education: None    Highest education level: None   Occupational History    None   Social Needs    Financial resource strain: None    Food insecurity     Worry: None     Inability: None    Transportation needs     Medical: None     Non-medical: None   Tobacco Use    Smoking status: Current Every Day Smoker     Packs/day: 1 00    Smokeless tobacco: Never Used   Substance and Sexual Activity    Alcohol use: Never     Frequency: Never    Drug use: Never    Sexual activity: None   Lifestyle    Physical activity     Days per week: None     Minutes per session: None    Stress: None   Relationships    Social connections     Talks on phone: None     Gets together: None     Attends Religion service: None     Active member of club or organization: None     Attends meetings of clubs or organizations: None     Relationship status: None    Intimate partner violence     Fear of current or ex partner: None     Emotionally abused: None     Physically abused: None     Forced sexual activity: None   Other Topics Concern    None   Social History Narrative    None       Subjective    Subjective Data: pt states she had no difficulty breathing        Objective    Physical Exam:   Assessment Type: Assess only  General Appearance: Alert  Respiratory Pattern: Normal  Chest Assessment: Chest expansion symmetrical  Bilateral Breath Sounds: Diminished  Cough: Strong, Non-productive  O2 Device: RA    Vitals:  Blood pressure 155/87, pulse (!) 119, temperature 99 5 °F (37 5 °C), resp  rate 20, height 5' 3" (1 6 m), weight 102 kg (225 lb), SpO2 91 %  Imaging and other studies: I have personally reviewed pertinent reports  O2 Device: RA     Plan             Resp Comments:  1PPD smoker no other pulmonary history  pt does not use any home resp therapy  pt states she usually coughs up some phlegm in the mornings   DC protocol

## 2021-02-05 NOTE — ASSESSMENT & PLAN NOTE
Patient recently admitted in this hospital inside [facility]  Continue IV hydration, pain management

## 2021-02-06 LAB
ALBUMIN SERPL BCP-MCNC: 2.2 G/DL (ref 3.5–5)
ALP SERPL-CCNC: 94 U/L (ref 46–116)
ALT SERPL W P-5'-P-CCNC: 26 U/L (ref 12–78)
ANION GAP SERPL CALCULATED.3IONS-SCNC: 10 MMOL/L (ref 4–13)
AST SERPL W P-5'-P-CCNC: 12 U/L (ref 5–45)
BACTERIA UR CULT: ABNORMAL
BACTERIA UR CULT: ABNORMAL
BASOPHILS # BLD AUTO: 0.02 THOUSANDS/ΜL (ref 0–0.1)
BASOPHILS NFR BLD AUTO: 0 % (ref 0–1)
BILIRUB SERPL-MCNC: 0.66 MG/DL (ref 0.2–1)
BUN SERPL-MCNC: 8 MG/DL (ref 5–25)
CALCIUM ALBUM COR SERPL-MCNC: 9.7 MG/DL (ref 8.3–10.1)
CALCIUM SERPL-MCNC: 8.3 MG/DL (ref 8.3–10.1)
CHLORIDE SERPL-SCNC: 101 MMOL/L (ref 100–108)
CO2 SERPL-SCNC: 24 MMOL/L (ref 21–32)
CREAT SERPL-MCNC: 0.66 MG/DL (ref 0.6–1.3)
EOSINOPHIL # BLD AUTO: 0.08 THOUSAND/ΜL (ref 0–0.61)
EOSINOPHIL NFR BLD AUTO: 1 % (ref 0–6)
ERYTHROCYTE [DISTWIDTH] IN BLOOD BY AUTOMATED COUNT: 12.9 % (ref 11.6–15.1)
GFR SERPL CREATININE-BSD FRML MDRD: 105 ML/MIN/1.73SQ M
GLUCOSE SERPL-MCNC: 154 MG/DL (ref 65–140)
GLUCOSE SERPL-MCNC: 172 MG/DL (ref 65–140)
GLUCOSE SERPL-MCNC: 177 MG/DL (ref 65–140)
GLUCOSE SERPL-MCNC: 180 MG/DL (ref 65–140)
GLUCOSE SERPL-MCNC: 200 MG/DL (ref 65–140)
HCT VFR BLD AUTO: 38.3 % (ref 34.8–46.1)
HGB BLD-MCNC: 13.3 G/DL (ref 11.5–15.4)
IMM GRANULOCYTES # BLD AUTO: 0.1 THOUSAND/UL (ref 0–0.2)
IMM GRANULOCYTES NFR BLD AUTO: 1 % (ref 0–2)
LYMPHOCYTES # BLD AUTO: 1.36 THOUSANDS/ΜL (ref 0.6–4.47)
LYMPHOCYTES NFR BLD AUTO: 16 % (ref 14–44)
MCH RBC QN AUTO: 32 PG (ref 26.8–34.3)
MCHC RBC AUTO-ENTMCNC: 34.7 G/DL (ref 31.4–37.4)
MCV RBC AUTO: 92 FL (ref 82–98)
MONOCYTES # BLD AUTO: 0.68 THOUSAND/ΜL (ref 0.17–1.22)
MONOCYTES NFR BLD AUTO: 8 % (ref 4–12)
NEUTROPHILS # BLD AUTO: 6.11 THOUSANDS/ΜL (ref 1.85–7.62)
NEUTS SEG NFR BLD AUTO: 74 % (ref 43–75)
NRBC BLD AUTO-RTO: 0 /100 WBCS
PLATELET # BLD AUTO: 113 THOUSANDS/UL (ref 149–390)
PMV BLD AUTO: 9.3 FL (ref 8.9–12.7)
POTASSIUM SERPL-SCNC: 3.3 MMOL/L (ref 3.5–5.3)
PROCALCITONIN SERPL-MCNC: 2.4 NG/ML
PROT SERPL-MCNC: 6.7 G/DL (ref 6.4–8.2)
RBC # BLD AUTO: 4.15 MILLION/UL (ref 3.81–5.12)
SODIUM SERPL-SCNC: 135 MMOL/L (ref 136–145)
WBC # BLD AUTO: 8.35 THOUSAND/UL (ref 4.31–10.16)

## 2021-02-06 PROCEDURE — 80053 COMPREHEN METABOLIC PANEL: CPT | Performed by: FAMILY MEDICINE

## 2021-02-06 PROCEDURE — 99232 SBSQ HOSP IP/OBS MODERATE 35: CPT | Performed by: FAMILY MEDICINE

## 2021-02-06 PROCEDURE — 85025 COMPLETE CBC W/AUTO DIFF WBC: CPT | Performed by: FAMILY MEDICINE

## 2021-02-06 PROCEDURE — 82948 REAGENT STRIP/BLOOD GLUCOSE: CPT

## 2021-02-06 PROCEDURE — 84145 PROCALCITONIN (PCT): CPT | Performed by: FAMILY MEDICINE

## 2021-02-06 RX ORDER — POTASSIUM CHLORIDE 20 MEQ/1
40 TABLET, EXTENDED RELEASE ORAL DAILY
Status: COMPLETED | OUTPATIENT
Start: 2021-02-06 | End: 2021-02-07

## 2021-02-06 RX ADMIN — OXYCODONE HYDROCHLORIDE AND ACETAMINOPHEN 1 TABLET: 5; 325 TABLET ORAL at 12:40

## 2021-02-06 RX ADMIN — POTASSIUM CHLORIDE 40 MEQ: 1500 TABLET, EXTENDED RELEASE ORAL at 10:01

## 2021-02-06 RX ADMIN — INSULIN LISPRO 2 UNITS: 100 INJECTION, SOLUTION INTRAVENOUS; SUBCUTANEOUS at 12:19

## 2021-02-06 RX ADMIN — ACETAMINOPHEN 650 MG: 325 TABLET ORAL at 14:14

## 2021-02-06 RX ADMIN — SODIUM CHLORIDE 100 ML/HR: 0.9 INJECTION, SOLUTION INTRAVENOUS at 20:06

## 2021-02-06 RX ADMIN — INSULIN LISPRO 1 UNITS: 100 INJECTION, SOLUTION INTRAVENOUS; SUBCUTANEOUS at 16:47

## 2021-02-06 RX ADMIN — SODIUM CHLORIDE 100 ML/HR: 0.9 INJECTION, SOLUTION INTRAVENOUS at 10:06

## 2021-02-06 RX ADMIN — ACETAMINOPHEN 650 MG: 325 TABLET ORAL at 20:06

## 2021-02-06 RX ADMIN — INSULIN LISPRO 1 UNITS: 100 INJECTION, SOLUTION INTRAVENOUS; SUBCUTANEOUS at 08:12

## 2021-02-06 RX ADMIN — ENOXAPARIN SODIUM 40 MG: 40 INJECTION SUBCUTANEOUS at 08:12

## 2021-02-06 RX ADMIN — INSULIN LISPRO 1 UNITS: 100 INJECTION, SOLUTION INTRAVENOUS; SUBCUTANEOUS at 20:34

## 2021-02-06 RX ADMIN — SODIUM CHLORIDE 100 ML/HR: 0.9 INJECTION, SOLUTION INTRAVENOUS at 00:58

## 2021-02-06 RX ADMIN — ACETAMINOPHEN 650 MG: 325 TABLET ORAL at 05:56

## 2021-02-06 RX ADMIN — LEVOFLOXACIN 750 MG: 5 INJECTION, SOLUTION INTRAVENOUS at 12:57

## 2021-02-06 RX ADMIN — TAMSULOSIN HYDROCHLORIDE 0.4 MG: 0.4 CAPSULE ORAL at 16:47

## 2021-02-06 NOTE — ASSESSMENT & PLAN NOTE
Lab Results   Component Value Date    HGBA1C 7 7 (H) 02/03/2021       Recent Labs     02/05/21  1526 02/05/21 2034 02/06/21  0724 02/06/21  1049   POCGLU 211* 195* 172* 200*       Blood Sugar Average: Last 72 hrs:  (P) 194 5 diabetic diet  Continue sliding scale insulin

## 2021-02-06 NOTE — ASSESSMENT & PLAN NOTE
Blood culture shows Gram-negative rods, urine culture shows Klebsiella most likely the source of infection  Patient recently admitted in the hospital due to nephrolithiasis with mild hydronephrosis with perinephric straight  Initially patient was receiving ceftriaxone, that patient has AMA and prescribed levofloxacin  Patient callback for blood culture report and came to the hospital   Continue IV levofloxacin-sensitive as per urine culture  Treated fever curve  Follow labs, follow repeat blood culture

## 2021-02-06 NOTE — ASSESSMENT & PLAN NOTE
Patient recently admitted in this hospital inside [facility]  Continue IV hydration, pain management

## 2021-02-06 NOTE — PROGRESS NOTES
Progress Note - Ernst Juarez 1970, 52 y o  female MRN: [id number]    Unit/Bed#: -01 Encounter: [id number]    Primary Care Provider: BRETT Sandoval   Date and time admitted to hospital: 2/5/2021 12:02 PM        * Bacteremia  Assessment & Plan  Blood culture shows Gram-negative rods, urine culture shows Klebsiella most likely the source of infection  Patient recently admitted in the hospital due to nephrolithiasis with mild hydronephrosis with perinephric straight  Initially patient was receiving ceftriaxone, that patient has AMA and prescribed levofloxacin  Patient callback for blood culture report and came to the hospital   Continue IV levofloxacin-sensitive as per urine culture  Treated fever curve  Follow labs, follow repeat blood culture    Nephrolithiasis  Assessment & Plan  Patient recently admitted in this hospital inside [facility]  Continue IV hydration, pain management    Type 2 diabetes mellitus without complication, without long-term current use of insulin [facility])  Assessment & Plan  Lab Results   Component Value Date    HGBA1C 7 7 (H) 02/03/2021       Recent Labs     02/05/21  1526 02/05/21  2034 02/06/21  0724 02/06/21  1049   POCGLU 211* 195* 172* 200*       Blood Sugar Average: Last 72 hrs:  (P) 194 5 diabetic diet  Continue sliding scale insulin        VTE Pharmacologic Prophylaxis:   Pharmacologic: Enoxaparin (Lovenox)  Mechanical VTE Prophylaxis in Place: Yes    Patient Centered Rounds: I have performed bedside rounds with nursing staff today  Education and Discussions with Family / Patient:  Yes with patient    Time Spent for Care: 20 minutes  More than 50% of total time spent on counseling and coordination of care as described above  Current Length of Stay: 1 day(s)    Current Patient Status: Inpatient   Certification Statement: The patient will continue to require additional inpatient hospital stay due to Bacteremia    Discharge Plan / Estimated Discharge Date:   To be determined      Code Status: Level 1 - Full Code      Subjective:   Seen and evaluated during the round  Patient is emotionally upset and wants to go home  Denies any significant complaint other that flank pain  Objective:     Vitals:   Temp (24hrs), Av 7 °F (37 6 °C), Min:99 2 °F (37 3 °C), Max:100 4 °F (38 °C)    Temp:  [99 2 °F (37 3 °C)-100 4 °F (38 °C)] 100 1 °F (37 8 °C)  HR:  [] 93  Resp:  [14-20] 20  BP: (150-158)/(86-96) 158/96  SpO2:  [91 %-97 %] 95 %  Body mass index is 39 86 kg/m²  Input and Output Summary (last 24 hours): Intake/Output Summary (Last 24 hours) at 2021 1356  Last data filed at 2021 1300  Gross per 24 hour   Intake 720 ml   Output --   Net 720 ml       Physical Exam:     Physical Exam  Vitals signs and nursing note reviewed  Constitutional:       Appearance: She is not diaphoretic  HENT:      Head: Normocephalic  Nose: Nose normal  No congestion  Mouth/Throat:      Mouth: Mucous membranes are moist       Pharynx: No oropharyngeal exudate  Eyes:      General: No scleral icterus  Pupils: Pupils are equal, round, and reactive to light  Neck:      Musculoskeletal: Normal range of motion  Cardiovascular:      Rate and Rhythm: Normal rate  Heart sounds: No gallop  Pulmonary:      Effort: Pulmonary effort is normal  No respiratory distress  Breath sounds: No stridor  No wheezing, rhonchi or rales  Chest:      Chest wall: No tenderness  Abdominal:      General: Abdomen is flat  Bowel sounds are normal  There is no distension  Palpations: There is no mass  Tenderness: There is no abdominal tenderness  There is no guarding  Hernia: No hernia is present  Musculoskeletal: Normal range of motion  Right lower leg: No edema  Lymphadenopathy:      Cervical: No cervical adenopathy  Skin:     General: Skin is warm  Neurological:      General: No focal deficit present        Mental Status: She is alert and oriented to person, place, and time  Cranial Nerves: No cranial nerve deficit  Sensory: No sensory deficit  Motor: No weakness  Coordination: Coordination normal       Gait: Gait normal       Deep Tendon Reflexes: Reflexes normal          Additional Data:     Labs:    Results from last 7 days   Lab Units 02/06/21  0534   WBC Thousand/uL 8 35   HEMOGLOBIN g/dL 13 3   HEMATOCRIT % 38 3   PLATELETS Thousands/uL 113*   NEUTROS PCT % 74   LYMPHS PCT % 16   MONOS PCT % 8   EOS PCT % 1     Results from last 7 days   Lab Units 02/06/21  0534   POTASSIUM mmol/L 3 3*   CHLORIDE mmol/L 101   CO2 mmol/L 24   BUN mg/dL 8   CREATININE mg/dL 0 66   CALCIUM mg/dL 8 3   ALK PHOS U/L 94   ALT U/L 26   AST U/L 12     Results from last 7 days   Lab Units 02/05/21  1214   INR  1 10       * I Have Reviewed All Lab Data Listed Above  * Additional Pertinent Lab Tests Reviewed: All Labs Within Last 24 Hours Reviewed      Recent Cultures (last 7 days):     Results from last 7 days   Lab Units 02/05/21  1214 02/03/21  2241 02/03/21  0959   BLOOD CULTURE  Received in Microbiology Lab  Culture in Progress  Received in Microbiology Lab  Culture in Progress   Klebsiella pneumoniae*  Klebsiella pneumoniae*  --    GRAM STAIN RESULT   --  Gram negative rods*  Gram negative rods*  --    URINE CULTURE   --   --  >100,000 cfu/ml Klebsiella pneumoniae*  <10,000 cfu/ml        Last 24 Hours Medication List:   Current Facility-Administered Medications   Medication Dose Route Frequency Provider Last Rate    acetaminophen  650 mg Oral Q6H PRN Annabell Dakins Islam, MD      aluminum-magnesium hydroxide-simethicone  30 mL Oral Q6H PRN Annabell Dakins Islam, MD      enoxaparin  40 mg Subcutaneous Q24H [facility] Brenda Yoo MD      insulin lispro  1-6 Units Subcutaneous TID AC Brenda Yoo MD      insulin lispro  1-6 Units Subcutaneous HS Mishel Sanchez MD      levofloxacin  750 mg Intravenous Q24H Mishel Sanchez  mg (02/06/21 1257)    LORazepam  0 5 mg Oral Q6H PRN Jett Vegas MD      oxyCODONE-acetaminophen  1 tablet Oral Q4H PRN Jett Vegas MD      potassium chloride  40 mEq Oral Daily Jett Vegas MD      sodium chloride  100 mL/hr Intravenous Continuous Jett Vegas  mL/hr (02/06/21 1006)    tamsulosin  0 4 mg Oral Daily With Marlowe Boxer, MD          Today, Patient Was Seen By: Jett Vegas MD    ** Please Note: Dragon 360 Dictation voice to text software may have been used in the creation of this document   **

## 2021-02-06 NOTE — UTILIZATION REVIEW
Unable to submit via [employer]  This Dann [employer] transaction is temporarily unavailable      Initial Clinical Review    Admission: Date/Time/Statement:   Admission Orders (From admission, onward)     Ordered        02/05/21 1214  Inpatient Admission  Once                   Orders Placed This Encounter   Procedures    Inpatient Admission     Standing Status:   Standing     Number of Occurrences:   1     Order Specific Question:   Level of Care     Answer:   Med Surg [16]     Order Specific Question:   Estimated length of stay     Answer:   More than 2 Midnights     Order Specific Question:   Certification     Answer:   I certify that inpatient services are medically necessary for this patient for a duration of greater than two midnights  See H&P and MD Progress Notes for additional information about the patient's course of treatment  ED Arrival Information     Expected Arrival Acuity Means of Arrival Escorted By Service Admission Type    - 2/5/2021 11:58 Urgent Walk-In Self Hospitalist Urgent    Arrival Complaint    Abnormal Labs        Chief Complaint   Patient presents with    Abdominal Pain     Pt reports LLQ pain, was admitted for kidney stones 2/2/21, pt signed out AMA, Admission doc called pt today about positive blodo cultures, told to come back to be readmitted     Assessment/Plan:   56y Female to ED presents with bacteremia  Recent admit to hospital 2/3/21 and signed AMA yesterday 2/4 due nephrolithiasis, perinephric strain, suspected pyelonephritis with fever  Given IV antibiotics and IVFs  Noted + bld cultures - Gram-negative rods  Pt asked to return to hospital for IV antibiotics  Admit Inpatient level of care for Bacteremia and Nephrolithiasis  Blood culture shows Gram-negative rods, urine culture shows Klebsiella most likely the source of infection  Patient recently admitted in the hospital due to nephrolithiasis with mild hydronephrosis with perinephric straight  Continue Iv antibiotics   Trend fever curve  F/u labs and f/u repeat bld culture  Pain control  2/6 Progress notes; Continue Iv antibiotics and IVFs  F/u repeat bld cultures  Pain control      ED Triage Vitals [02/05/21 1206]   Temperature Pulse Respirations Blood Pressure SpO2   98 6 °F (37 °C) (!) 120 19 163/89 94 %      Temp Source Heart Rate Source Patient Position - Orthostatic VS BP Location FiO2 (%)   Temporal Monitor Sitting Left arm --      Pain Score       4          Wt Readings from Last 1 Encounters:   02/05/21 102 kg (225 lb)     Additional Vital Signs:   02/06/21 07:24:34  100 1 °F (37 8 °C)  93  20  158/96  117  95 %  --  --   02/06/21 05:34:54  99 5 °F (37 5 °C)  103  17  --  --  94 %  --  --   02/05/21 23:22:31  99 6 °F (37 6 °C)  --  14  150/86  107  --  --  --   02/05/21 19:21:03  100 4 °F (38 °C)  108Abnormal   --  152/87  109  93 %  --  --   02/05/21 1751  --  119Abnormal   --  --  --  91 %  --  --   02/05/21 1500  --  --  --  --  --  97 %  None (Room air)  --   02/05/21 14:38:11  99 2 °F (37 3 °C)  116Abnormal   20  155/87  110  97 %  None (Room air)  Lying   02/05/21 14:18:28  99 5 °F (37 5 °C)  119Abnormal   20  152/87  109  97 %  --  --   02/05/21 1345  --  113Abnormal   --  --  --  96 %  --  --   02/05/21 1330  --  112Abnormal   --  178/84Abnormal   120  97 %         Pertinent Labs/Diagnostic Test Results:       Results from last 7 days   Lab Units 02/06/21  0534 02/05/21  1214 02/04/21  0457 02/03/21  1032   WBC Thousand/uL 8 35 9 73 10 30* 17 83*   HEMOGLOBIN g/dL 13 3 14 0 13 8 16 7*   HEMATOCRIT % 38 3 40 0 40 9 47 7*   PLATELETS Thousands/uL 113* 125* 111* 192   NEUTROS ABS Thousands/µL 6 11 8 01* 9 17* 16 28*         Results from last 7 days   Lab Units 02/06/21  0534 02/05/21  1214 02/04/21  0457 02/03/21  1032   SODIUM mmol/L 135* 134* 136 135*   POTASSIUM mmol/L 3 3* 3 1* 3 6 3 9   CHLORIDE mmol/L 101 100 102 100   CO2 mmol/L 24 24 22 21   ANION GAP mmol/L 10 10 12 14*   BUN mg/dL 8 9 9 9   CREATININE mg/dL 0 66 0 83 0  91 0 98   EGFR ml/min/1 73sq m 105 84 75 68   CALCIUM mg/dL 8 3 8 8 8 4 9 0   MAGNESIUM mg/dL  --   --   --  1 6     Results from last 7 days   Lab Units 02/06/21  0534 02/05/21  1214 02/04/21  0457 02/03/21  1032   AST U/L 12 14 14 18   ALT U/L 26 28 25 35   ALK PHOS U/L 94 107 86 116   TOTAL PROTEIN g/dL 6 7 7 2 6 6 8 0   ALBUMIN g/dL 2 2* 2 6* 2 6* 3 5   TOTAL BILIRUBIN mg/dL 0 66 0 67 0 87 0 94     Results from last 7 days   Lab Units 02/06/21  1049 02/06/21  0724 02/05/21  2034 02/05/21  1526 02/04/21  0700 02/03/21  2059 02/03/21  1521   POC GLUCOSE mg/dl 200* 172* 195* 211* 191* 267* 240*     Results from last 7 days   Lab Units 02/06/21  0534 02/05/21  1214 02/04/21  0457 02/03/21  1032   GLUCOSE RANDOM mg/dL 177* 236* 221* 264*         Results from last 7 days   Lab Units 02/03/21  1032   HEMOGLOBIN A1C % 7 7*   EAG mg/dl 174       Results from last 7 days   Lab Units 02/03/21  1032   CK TOTAL U/L 58             Results from last 7 days   Lab Units 02/05/21  1214 02/03/21  1032   PROTIME seconds 14 0 12 7   INR  1 10 0 97   PTT seconds 31  --          Results from last 7 days   Lab Units 02/05/21  1530 02/03/21  2241   PROCALCITONIN ng/ml 3 30* 7 05*     Results from last 7 days   Lab Units 02/05/21  1214 02/03/21  1702 02/03/21  1317 02/03/21  1032   LACTIC ACID mmol/L 1 0 2 0 2 2* 3 1*       Results from last 7 days   Lab Units 02/05/21  1214 02/03/21  1032   LIPASE u/L 43* 35*     Results from last 7 days   Lab Units 02/03/21  0959   CLARITY UA  Clear   COLOR UA  Yellow   SPEC GRAV UA  <=1 005   PH UA  6 0   GLUCOSE UA mg/dl >=1000 (1%)*   KETONES UA mg/dl Negative   BLOOD UA  Small*   PROTEIN UA mg/dl Negative   NITRITE UA  Negative   BILIRUBIN UA  Negative   UROBILINOGEN UA E U /dl 0 2   LEUKOCYTES UA  Elevated glucose may cause decreased leukocyte values   See urine microscopic for [facility] result/*   WBC UA /hpf 0-5   RBC UA /hpf None Seen   BACTERIA UA /hpf Occasional   EPITHELIAL CELLS WET PREP /hpf Occasional       Results from last 7 days   Lab Units 02/05/21  1214 02/03/21  2241 02/03/21  0959   BLOOD CULTURE  Received in Microbiology Lab  Culture in Progress  Received in Microbiology Lab  Culture in Progress   Klebsiella pneumoniae*  Klebsiella pneumoniae*  --    GRAM STAIN RESULT   --  Gram negative rods*  Gram negative rods*  --    URINE CULTURE   --   --  >100,000 cfu/ml Klebsiella pneumoniae*  <10,000 cfu/ml      ED Treatment:   Medication Administration from 02/05/2021 1158 to 02/05/2021 1417       Date/Time Order Dose Route Action     02/05/2021 1227 levofloxacin (LEVAQUIN) IVPB (premix in dextrose) 500 mg 100 mL 500 mg Intravenous New Bag     02/05/2021 1252 sodium chloride 0 9 % bolus 2,000 mL 2,000 mL Intravenous New Bag        Past Medical History:   Diagnosis Date    High cholesterol      Present on Admission:   [facility]   Nephrolithiasis   Type 2 diabetes mellitus without complication, without long-term current use of insulin (HCC)   Bacteremia      Admitting Diagnosis: Gram negative [facility] ([facility] ) [A41 50]  Weakness [R53 1]  Age/Sex: 52 y o  female     Admission Orders:  Scheduled Medications:  enoxaparin, 40 mg, Subcutaneous, Q24H [address]  insulin lispro, 1-6 Units, Subcutaneous, TID AC  insulin lispro, 1-6 Units, Subcutaneous, HS  levofloxacin, 750 mg, Intravenous, Q24H  potassium chloride, 40 mEq, Oral, Daily  tamsulosin, 0 4 mg, Oral, Daily With Dinner      Continuous IV Infusions:  sodium chloride, 100 mL/hr, Intravenous, Continuous      PRN Meds:  acetaminophen, 650 mg, Oral, Q6H PRN 2/5 x1, 2/6 x2  aluminum-magnesium hydroxide-simethicone, 30 mL, Oral, Q6H PRN  LORazepam, 0 5 mg, Oral, Q6H PRN  oxyCODONE-acetaminophen, 1 tablet, Oral, Q4H PRN 2/5 x1, 2/6 x1      Echo  Bld culture x2    Network Utilization Review Department  ATTENTION: Please call with any questions or concerns to [phone] and carefully listen to the prompts so that you are directed to the right person  All voicemails are confidential   Trudy Candelario all requests for admission clinical reviews, approved or denied determinations and any other requests to dedicated fax number below belonging to the campus where the patient is receiving treatment   List of dedicated fax numbers for the Facilities:  [address] DENIALS (Administrative/Medical Necessity) [phone]   [address] (Maternity/NICU/Pediatrics) [phone]   [address] 40 [address] [facility] [address] Clark Ray 2617 (  [facility]) [address] [place] [address] Fadumo Calin Dejesus [address] [facility] [address] [place] [address] [phone]

## 2021-02-07 VITALS
BODY MASS INDEX: 39.87 KG/M2 | SYSTOLIC BLOOD PRESSURE: 168 MMHG | HEART RATE: 98 BPM | DIASTOLIC BLOOD PRESSURE: 91 MMHG | OXYGEN SATURATION: 95 % | HEIGHT: 63 IN | TEMPERATURE: 98.1 F | RESPIRATION RATE: 16 BRPM | WEIGHT: 225 LBS

## 2021-02-07 PROBLEM — R50.9 FEVER: Status: ACTIVE | Noted: 2021-02-07

## 2021-02-07 LAB
ALBUMIN SERPL BCP-MCNC: 2 G/DL (ref 3.5–5)
ALP SERPL-CCNC: 89 U/L (ref 46–116)
ALT SERPL W P-5'-P-CCNC: 27 U/L (ref 12–78)
ANION GAP SERPL CALCULATED.3IONS-SCNC: 11 MMOL/L (ref 4–13)
AST SERPL W P-5'-P-CCNC: 13 U/L (ref 5–45)
BACTERIA BLD CULT: ABNORMAL
BACTERIA BLD CULT: ABNORMAL
BASOPHILS # BLD AUTO: 0.03 THOUSANDS/ΜL (ref 0–0.1)
BASOPHILS NFR BLD AUTO: 0 % (ref 0–1)
BILIRUB SERPL-MCNC: 0.65 MG/DL (ref 0.2–1)
BUN SERPL-MCNC: 7 MG/DL (ref 5–25)
CALCIUM ALBUM COR SERPL-MCNC: 9.6 MG/DL (ref 8.3–10.1)
CALCIUM SERPL-MCNC: 8 MG/DL (ref 8.3–10.1)
CHLORIDE SERPL-SCNC: 102 MMOL/L (ref 100–108)
CO2 SERPL-SCNC: 24 MMOL/L (ref 21–32)
CREAT SERPL-MCNC: 0.61 MG/DL (ref 0.6–1.3)
EOSINOPHIL # BLD AUTO: 0.09 THOUSAND/ΜL (ref 0–0.61)
EOSINOPHIL NFR BLD AUTO: 1 % (ref 0–6)
ERYTHROCYTE [DISTWIDTH] IN BLOOD BY AUTOMATED COUNT: 12.9 % (ref 11.6–15.1)
GFR SERPL CREATININE-BSD FRML MDRD: 108 ML/MIN/1.73SQ M
GLUCOSE SERPL-MCNC: 134 MG/DL (ref 65–140)
GLUCOSE SERPL-MCNC: 155 MG/DL (ref 65–140)
GLUCOSE SERPL-MCNC: 175 MG/DL (ref 65–140)
GRAM STN SPEC: ABNORMAL
GRAM STN SPEC: ABNORMAL
HCT VFR BLD AUTO: 36.4 % (ref 34.8–46.1)
HGB BLD-MCNC: 12.4 G/DL (ref 11.5–15.4)
IMM GRANULOCYTES # BLD AUTO: 0.16 THOUSAND/UL (ref 0–0.2)
IMM GRANULOCYTES NFR BLD AUTO: 2 % (ref 0–2)
LYMPHOCYTES # BLD AUTO: 1.45 THOUSANDS/ΜL (ref 0.6–4.47)
LYMPHOCYTES NFR BLD AUTO: 18 % (ref 14–44)
MCH RBC QN AUTO: 31.2 PG (ref 26.8–34.3)
MCHC RBC AUTO-ENTMCNC: 34.1 G/DL (ref 31.4–37.4)
MCV RBC AUTO: 92 FL (ref 82–98)
MONOCYTES # BLD AUTO: 0.79 THOUSAND/ΜL (ref 0.17–1.22)
MONOCYTES NFR BLD AUTO: 10 % (ref 4–12)
NEUTROPHILS # BLD AUTO: 5.55 THOUSANDS/ΜL (ref 1.85–7.62)
NEUTS SEG NFR BLD AUTO: 69 % (ref 43–75)
NRBC BLD AUTO-RTO: 0 /100 WBCS
PLATELET # BLD AUTO: 114 THOUSANDS/UL (ref 149–390)
PMV BLD AUTO: 9.4 FL (ref 8.9–12.7)
POTASSIUM SERPL-SCNC: 3.2 MMOL/L (ref 3.5–5.3)
PROT SERPL-MCNC: 6.3 G/DL (ref 6.4–8.2)
RBC # BLD AUTO: 3.97 MILLION/UL (ref 3.81–5.12)
SODIUM SERPL-SCNC: 137 MMOL/L (ref 136–145)
WBC # BLD AUTO: 8.07 THOUSAND/UL (ref 4.31–10.16)

## 2021-02-07 PROCEDURE — 80053 COMPREHEN METABOLIC PANEL: CPT | Performed by: FAMILY MEDICINE

## 2021-02-07 PROCEDURE — 85025 COMPLETE CBC W/AUTO DIFF WBC: CPT | Performed by: FAMILY MEDICINE

## 2021-02-07 PROCEDURE — 99239 HOSP IP/OBS DSCHRG MGMT >30: CPT | Performed by: FAMILY MEDICINE

## 2021-02-07 PROCEDURE — 82948 REAGENT STRIP/BLOOD GLUCOSE: CPT

## 2021-02-07 RX ORDER — LABETALOL 20 MG/4 ML (5 MG/ML) INTRAVENOUS SYRINGE
10 EVERY 6 HOURS PRN
Status: DISCONTINUED | OUTPATIENT
Start: 2021-02-07 | End: 2021-02-07 | Stop reason: HOSPADM

## 2021-02-07 RX ORDER — IBUPROFEN 400 MG/1
400 TABLET ORAL ONCE
Status: COMPLETED | OUTPATIENT
Start: 2021-02-07 | End: 2021-02-07

## 2021-02-07 RX ORDER — LEVOFLOXACIN 500 MG/1
500 TABLET, FILM COATED ORAL EVERY 24 HOURS
Qty: 3 TABLET | Refills: 0 | Status: SHIPPED | OUTPATIENT
Start: 2021-02-07 | End: 2021-02-10

## 2021-02-07 RX ADMIN — SODIUM CHLORIDE 100 ML/HR: 0.9 INJECTION, SOLUTION INTRAVENOUS at 05:01

## 2021-02-07 RX ADMIN — LEVOFLOXACIN 750 MG: 5 INJECTION, SOLUTION INTRAVENOUS at 12:03

## 2021-02-07 RX ADMIN — INSULIN LISPRO 1 UNITS: 100 INJECTION, SOLUTION INTRAVENOUS; SUBCUTANEOUS at 07:58

## 2021-02-07 RX ADMIN — ENOXAPARIN SODIUM 40 MG: 40 INJECTION SUBCUTANEOUS at 07:59

## 2021-02-07 RX ADMIN — LABETALOL 20 MG/4 ML (5 MG/ML) INTRAVENOUS SYRINGE 10 MG: at 05:06

## 2021-02-07 RX ADMIN — POTASSIUM CHLORIDE 40 MEQ: 1500 TABLET, EXTENDED RELEASE ORAL at 07:58

## 2021-02-07 RX ADMIN — IBUPROFEN 400 MG: 400 TABLET ORAL at 05:30

## 2021-02-07 NOTE — NURSING NOTE
Patient requesting to leave  Explained that her insurance may not cover the hospital stay since she is leaving AMA  Patient verbalized understanding and says she is leaving no matter what  Patient was convinced to stay until she received her next dose of IV antibiotics  Dr Ford Pouch aware and discussed AMA with patient  She signed the form  Meds retrieved from pharmacy and will be given to patient upon discharge

## 2021-02-07 NOTE — ASSESSMENT & PLAN NOTE
Unknown etiology  Patient had history of bacteremia with Gram-negative rods, Klebsiella-most likely the source of infection was the renal  Patient still spiking low-grade fever, last elevated temperature was 100 4° at 5:00 a m  On 02/07/21  Patient received 3 dose of IV levofloxacin, and 1 dose of p o  Levofloxacin at home -patient advised to complete in total 5 days of antibiotic treatment  Patient does have remaining levofloxacin at home  After knowing the risks, benefits, side-effects and alternatives, patient signed AMA  Witnessed by corresponding nurse

## 2021-02-07 NOTE — DISCHARGE INSTRUCTIONS
Kidney Infection   WHAT YOU NEED TO KNOW:   A kidney infection, or pyelonephritis, is a bacterial infection  The infection usually starts in your bladder or urethra and moves into your kidney  One or both kidneys may be infected  DISCHARGE INSTRUCTIONS:   Return to the emergency department if:   · You have a fever and chills       · You cannot stop vomiting      · You have severe pain in your abdomen, lower back, or sides      Contact your healthcare provider if:   · You continue to have a fever after you take antibiotics for 3 days      · You have pain when you urinate, even after treatment      · Your signs and symptoms return      · You have questions or concerns about your condition or care      Medicines: You may need any of the following:  · Antibiotics treat your bacterial infection       · Acetaminophen decreases pain and fever  It is available without a doctor's order  Ask how much to take and how often to take it  Follow directions  Read the labels of all other medicines you are using to see if they also contain acetaminophen, or ask your doctor or pharmacist  Acetaminophen can cause liver damage if not taken correctly  Do not use more than 4 grams (4,000 milligrams) total of acetaminophen in one day       · NSAIDs , such as ibuprofen, help decrease swelling, pain, and fever  This medicine is available with or without a doctor's order  NSAIDs can cause stomach bleeding or kidney problems in certain people  If you take blood thinner medicine, always ask if NSAIDs are safe for you  Always read the medicine label and follow directions  Do not give these medicines to children under 10months of age without direction from your child's healthcare provider       · Prescription pain medicine may be given  Ask how to take this medicine safely      · Take your medicine as directed  Contact your healthcare provider if you think your medicine is not helping or if you have side effects   Tell him of her if you are allergic to any medicine  Keep a list of the medicines, vitamins, and herbs you take  Include the amounts, and when and why you take them  Bring the list or the pill bottles to follow-up visits  Carry your medicine list with you in case of an emergency      Drink liquids as directed: You may need to drink extra liquids to help flush your kidneys and urinary system  Water is the best liquid to drink  Ask your healthcare provider how much liquid to drink each day and which liquids are best for you  Urinate as soon as you feel the urge: This will help flush bacteria from your urinary system  Do not wait or hold your urine for too long  Clean your genital area every day with soap and water: Wipe from front to back after you urinate or have a bowel movement  Wear cotton underwear  Fabrics such as nylon and polyester can stay damp  This can increase your risk for infection  Urinate within 15 minutes after you have sex  Follow up with your healthcare provider as directed: Write down your questions so you remember to ask them during your visits  © Copyright [address] Information is for End User's use only and may not be sold, redistributed or otherwise used for commercial purposes  All illustrations and images included in CareNotes® are the copyrighted property of [employer]  or [facility]   The above information is an  only  It is not intended as medical advice for individual conditions or treatments   Talk to your doctor, nurse or pharmacist before following any medical regimen to see if it is safe and effective for you

## 2021-02-07 NOTE — ASSESSMENT & PLAN NOTE
Blood culture shows Gram-negative rods, urine culture shows Klebsiella most likely the source of infection  Patient recently admitted in the hospital due to nephrolithiasis with mild hydronephrosis with perinephric straight  Initially patient was receiving ceftriaxone, that patient has AMA and prescribed levofloxacin  Patient callback for blood culture report and came to the hospital   Continue IV levofloxacin-sensitive as per urine culture  Patient received 3 dose of IV levofloxacin already to 1 dose p o  At home  Patient does have remaining levofloxacin feel at home-patient agrees to complete the antibiotic course in total 5 days  Repeat blood culture shows no growth  Patient is still spiking fever, temperature this morning at 5:00 a m  Was 100 4-unknown source  Patient is adamant and signed AMA again after knowing the risks, benefits, side-effects and alternatives  Patient was offer that this provider can discuss with her family member  Patient reports he already discuss this issue with her

## 2021-02-07 NOTE — ASSESSMENT & PLAN NOTE
Patient recently admitted in this hospital inside [facility]  Patient again signed [facility]

## 2021-02-07 NOTE — DISCHARGE SUMMARY
Discharge- Indu Harris 1970, 52 y o  female MRN: [id number]    Unit/Bed#: -01 Encounter: [id number]    Primary Care Provider: BRETT Bernal   Date and time admitted to hospital: 2/5/2021 12:02 PM        * Bacteremia  Assessment & Plan  Blood culture shows Gram-negative rods, urine culture shows Klebsiella most likely the source of infection  Patient recently admitted in the hospital due to nephrolithiasis with mild hydronephrosis with perinephric straight  Initially patient was receiving ceftriaxone, that patient has AMA and prescribed levofloxacin  Patient callback for blood culture report and came to the hospital   Continue IV levofloxacin-sensitive as per urine culture  Patient received 3 dose of IV levofloxacin already to 1 dose p o  At home  Patient does have remaining levofloxacin feel at home-patient agrees to complete the antibiotic course in total 5 days  Repeat blood culture shows no growth  Patient is still spiking fever, temperature this morning at 5:00 a m  Was 100 4-unknown source  Patient is adamant and signed AMA again after knowing the risks, benefits, side-effects and alternatives  Patient was offer that this provider can discuss with her family member  Patient reports he already discuss this issue with her   Nephrolithiasis  Assessment & Plan  Patient recently admitted in this hospital inside [facility]  Patient again signed AMA    Fever  Assessment & Plan  Unknown etiology  Patient had history of bacteremia with Gram-negative rods, Klebsiella-most likely the source of infection was the renal  Patient still spiking low-grade fever, last elevated temperature was 100 4° at 5:00 a m  On 02/07/21  Patient received 3 dose of IV levofloxacin, and 1 dose of p o  Levofloxacin at home -patient advised to complete in total 5 days of antibiotic treatment  Patient does have remaining levofloxacin at home    After knowing the risks, benefits, side-effects and alternatives, patient signed AMA  Witnessed by corresponding nurse  Type 2 diabetes mellitus without complication, without long-term current use of insulin [facility])  Assessment & Plan  Lab Results   Component Value Date    HGBA1C 7 7 (H) 02/03/2021       Recent Labs     02/06/21  1537 02/06/21  2024 02/07/21  0716 02/07/21  1121   POCGLU 154* 180* 155* 134       Blood Sugar Average: Last 72 hrs:  (P) 175 125 diabetic diet  Continue sliding scale insulin  Patient signed [facility]          Discharging Physician / Practitioner: Mishel Sanchez MD  PCP: BRETT Marinelli  Admission Date:   Admission Orders (From admission, onward)     Ordered        02/05/21 1214  Inpatient Admission  Once                   Discharge Date: 02/07/21    Resolved Problems  Date Reviewed: 2/5/2021          Resolved    Sepsis ([facility] ) 2/5/2021     Resolved by  Mishel Sanchez MD          Consultations During Hospital Stay:  · none    Procedures Performed:   No orders to display   ·   Echo complete with contrast if indicated:    LEFT VENTRICLE:  Systolic function was normal  Ejection fraction was estimated in the range of 60 % to 65 %  There were no regional wall motion abnormalities      MITRAL VALVE:  There was trace regurgitation      TRICUSPID VALVE:  There was trace regurgitation      COMPARISONS:  No prior study for comparison      Significant Findings / Test Results:   Lab Results   Component Value Date    WBC 8 07 02/07/2021    HGB 12 4 02/07/2021    HCT 36 4 02/07/2021    MCV 92 02/07/2021     (L) 02/07/2021   ·   Lab Results   Component Value Date    SODIUM 137 02/07/2021    K 3 2 (L) 02/07/2021     02/07/2021    CO2 24 02/07/2021    AGAP 11 02/07/2021    BUN 7 02/07/2021    CREATININE 0 61 02/07/2021    GLUC 175 (H) 02/07/2021    GLUF 221 (H) 02/04/2021    CALCIUM 8 0 (L) 02/07/2021    AST 13 02/07/2021    ALT 27 02/07/2021    ALKPHOS 89 02/07/2021    TP 6 3 (L) 02/07/2021    TBILI 0 65 02/07/2021    EGFR 108 02/07/2021   ·   ·     Incidental Findings:   · none     Test Results Pending at Discharge (will require follow up):   · none     Outpatient Tests Requested:  · none    Complications:  None    Reason for Admission:  Bacteremia    Hospital Course:     Sarina Ma is a 52 y o  female patient who originally presented to the hospital on 2/5/2021 due to bacteremia  Patient recently was admitted into the hospital on the diagnosis of nephrolithiasis and possible pyelonephritis  Patient received initially IV antibiotic with ciprofloxacin and bed patient signed AMA at that time levofloxacin was prescribed  Later on, patient's blood culture came back gram negative rods with Klebsiella  Which was sensitive to levofloxacin  Patient received 3 dose of IV levofloxacin  Repeat blood culture shows no growth in 24 hours  But patient is still spiking fever, last temperature was 100 4° on 02/07/2021 at 5:00 a m  Phyllis Mo Discussed about risk, benefits, side-effects and alternatives  Request the patient to stay in the hospital for next 24 hours for monitor  After discussing everything, patient signed AMA  This provider also offer the patient to discuss her condition with her family member  Patient reports she already informed her  and they are aware of this  The patient, initially admitted to the hospital as inpatient, was discharged earlier than expected given the following: signed AMA  Please see above list of diagnoses and related plan for additional information  Condition at Discharge: stable     Discharge Day Visit / Exam:     Subjective:  Seen and evaluated during the round  Patient denies any significant complaint      Patient is adamant to go home and signed AMA    Vitals: Blood Pressure: 168/91 (02/07/21 0712)  Pulse: 98 (02/07/21 0712)  Temperature: 98 1 °F (36 7 °C) (02/07/21 1117)  Temp Source: Oral (02/07/21 1117)  Respirations: 16 (02/07/21 0712)  Height: 5' 3" (160 cm) (02/05/21 1206)  Weight - Scale: 102 kg (225 lb) (02/05/21 1206)  SpO2: 95 % (02/07/21 8019)  Exam:   Physical Exam  Vitals signs and nursing note reviewed  Exam conducted with a chaperone present  Constitutional:       Appearance: She is not diaphoretic  HENT:      Head: Normocephalic and atraumatic  Nose: No congestion  Mouth/Throat:      Mouth: Mucous membranes are moist       Pharynx: No oropharyngeal exudate  Eyes:      General: No scleral icterus  Conjunctiva/sclera: Conjunctivae normal       Pupils: Pupils are equal, round, and reactive to light  Neck:      Musculoskeletal: Normal range of motion  Cardiovascular:      Rate and Rhythm: Normal rate  Rhythm irregular  Heart sounds: No murmur  No friction rub  No gallop  Pulmonary:      Effort: Pulmonary effort is normal  No respiratory distress  Breath sounds: No stridor  No wheezing, rhonchi or rales  Chest:      Chest wall: No tenderness  Abdominal:      General: Abdomen is flat  Bowel sounds are normal  There is no distension  Palpations: There is no mass  Tenderness: There is no abdominal tenderness  There is no right CVA tenderness, left CVA tenderness, guarding or rebound  Hernia: No hernia is present  Lymphadenopathy:      Cervical: No cervical adenopathy  Skin:     Capillary Refill: Capillary refill takes less than 2 seconds  Neurological:      General: No focal deficit present  Mental Status: She is alert and oriented to person, place, and time  Cranial Nerves: No cranial nerve deficit  Sensory: No sensory deficit  Motor: No weakness  Coordination: Coordination normal       Gait: Gait normal       Deep Tendon Reflexes: Reflexes normal        Discussion with Family: no-as per patient she already discussed with her   Discharge instructions/Information to patient and family:   See after visit summary for information provided to patient and family        Provisions for Follow-Up Care:  See after visit summary for information related to follow-up care and any pertinent home health orders  Disposition:     Home- signed AMA    For Discharges to [facility] SNF:   · Not Applicable to this Patient - Not Applicable to this Patient    Planned Readmission:  If condition get worse     Discharge Statement:  I spent >35 minutes discharging the patient  This time was spent on the day of discharge  I had direct contact with the patient on the day of discharge  Greater than 50% of the total time was spent examining patient, answering all patient questions, arranging and discussing plan of care with patient as well as directly providing post-discharge instructions  Additional time then spent on discharge activities  Discharge Medications:  See after visit summary for reconciled discharge medications provided to patient and family        ** Please Note: This note has been constructed using a voice recognition system **

## 2021-02-07 NOTE — PLAN OF CARE
Problem: METABOLIC, FLUID AND ELECTROLYTES - ADULT  Goal: Electrolytes maintained within normal limits  Description: INTERVENTIONS:  - Monitor labs and assess patient for signs and symptoms of electrolyte imbalances  - Administer electrolyte replacement as ordered  - Monitor response to electrolyte replacements, including repeat lab results as appropriate  - Instruct patient on fluid and nutrition as appropriate  2/7/2021 1213 by Rula Mesa RN  Outcome: Progressing  2/7/2021 1213 by Rula Mesa RN  Outcome: Progressing  Goal: Fluid balance maintained  Description: INTERVENTIONS:  - Monitor labs   - Monitor I/O and WT  - Instruct patient on fluid and nutrition as appropriate  - Assess for signs & symptoms of volume excess or deficit  2/7/2021 1213 by Rula Mesa RN  Outcome: Progressing  2/7/2021 1213 by Rula Mesa RN  Outcome: Progressing     Problem: PAIN - ADULT  Goal: Verbalizes/displays adequate comfort level or baseline comfort level  Description: Interventions:  - Encourage patient to monitor pain and request assistance  - Assess pain using appropriate pain scale  - Administer analgesics based on type and severity of pain and evaluate response  - Implement non-pharmacological measures as appropriate and evaluate response  - Consider cultural and social influences on pain and pain management  - Notify physician/advanced practitioner if interventions unsuccessful or patient reports new pain  2/7/2021 1213 by Rula Mesa RN  Outcome: Progressing  2/7/2021 1213 by Rula Mesa RN  Outcome: Progressing     Problem: INFECTION - ADULT  Goal: Absence or prevention of progression during hospitalization  Description: INTERVENTIONS:  - Assess and monitor for signs and symptoms of infection  - Monitor lab/diagnostic results  - Monitor all insertion sites, i e  indwelling lines, tubes, and drains  - Monitor endotracheal if appropriate and nasal secretions for changes in amount and color  - [place] appropriate cooling/warming therapies per order  - Administer medications as ordered  - Instruct and encourage patient and family to use good hand hygiene technique  - Identify and instruct in appropriate isolation precautions for identified infection/condition  2/7/2021 1213 by Violet Bradford RN  Outcome: Progressing  2/7/2021 1213 by Violet Bradford RN  Outcome: Progressing     Problem: SAFETY ADULT  Goal: Patient will remain free of falls  Description: INTERVENTIONS:  - Assess patient frequently for physical needs  -  Identify cognitive and physical deficits and behaviors that affect risk of falls    -  [place] fall precautions as indicated by assessment   - Educate patient/family on patient safety including physical limitations  - Instruct patient to call for assistance with activity based on assessment  - Modify environment to reduce risk of injury  - Consider OT/PT consult to assist with strengthening/mobility  2/7/2021 1213 by Violet Bradford RN  Outcome: Progressing  2/7/2021 1213 by Violet Bradford RN  Outcome: Progressing  Goal: Maintain or return to baseline ADL function  Description: INTERVENTIONS:  -  Assess patient's ability to carry out ADLs; assess patient's baseline for ADL function and identify physical deficits which impact ability to perform ADLs (bathing, care of mouth/teeth, toileting, grooming, dressing, etc )  - Assess/evaluate cause of self-care deficits   - Assess range of motion  - Assess patient's mobility; develop plan if impaired  - Assess patient's need for assistive devices and provide as appropriate  - Encourage maximum independence but intervene and supervise when necessary  - Involve family in performance of ADLs  - Assess for home care needs following discharge   - Consider OT consult to assist with ADL evaluation and planning for discharge  - Provide patient education as appropriate  2/7/2021 1213 by Violet Bradford RN  Outcome: Progressing  2/7/2021 1213 by Reta Raphael RN  Outcome: Progressing  Goal: Maintain or return mobility status to optimal level  Description: INTERVENTIONS:  - Assess patient's baseline mobility status (ambulation, transfers, stairs, etc )    - Identify cognitive and physical deficits and behaviors that affect mobility  - Identify mobility aids required to assist with transfers and/or ambulation (gait belt, sit-to-stand, lift, walker, cane, etc )  - [place] fall precautions as indicated by assessment  - Record patient progress and toleration of activity level on Mobility SBAR; progress patient to next Phase/Stage  - Instruct patient to call for assistance with activity based on assessment  - Consider rehabilitation consult to assist with strengthening/weightbearing, etc   2/7/2021 1213 by Reta Raphael RN  Outcome: Progressing  2/7/2021 1213 by Reta Raphael RN  Outcome: Progressing     Problem: DISCHARGE PLANNING  Goal: Discharge to home or other facility with appropriate resources  Description: INTERVENTIONS:  - Identify barriers to discharge w/patient and caregiver  - Arrange for needed discharge resources and transportation as appropriate  - Identify discharge learning needs (meds, wound care, etc )  - Arrange for interpretive services to assist at discharge as needed  - Refer to Case Management Department for coordinating discharge planning if the patient needs post-hospital services based on physician/advanced practitioner order or complex needs related to functional status, cognitive ability, or social support system  2/7/2021 1213 by Reta Raphael RN  Outcome: Progressing  2/7/2021 1213 by Reta Raphael RN  Outcome: Progressing     Problem: Knowledge Deficit  Goal: Patient/family/caregiver demonstrates understanding of disease process, treatment plan, medications, and discharge instructions  Description: Complete learning assessment and assess knowledge base    Interventions:  - Provide teaching at level of understanding  - Provide teaching via preferred learning methods  2/7/2021 1213 by Mounika Wright RN  Outcome: Progressing  2/7/2021 1213 by Mounika Wright RN  Outcome: Progressing     Problem: Potential for Falls  Goal: Patient will remain free of falls  Description: INTERVENTIONS:  - Assess patient frequently for physical needs  -  Identify cognitive and physical deficits and behaviors that affect risk of falls    -  [place] fall precautions as indicated by assessment   - Educate patient/family on patient safety including physical limitations  - Instruct patient to call for assistance with activity based on assessment  - Modify environment to reduce risk of injury  - Consider OT/PT consult to assist with strengthening/mobility  2/7/2021 1213 by Mounika Wright RN  Outcome: Progressing  2/7/2021 1213 by Mounika Wright RN  Outcome: Progressing

## 2021-02-07 NOTE — NURSING NOTE
Patient removed her own IV  She was walking down the [place] ready to leave on her own when she was stopped by a PCA  AVS gone over with patient who was in a hurry and not paying attention to the instructions  She did verbalize that she understood her discharge instructions  She left without her ibuprofen that was held in the pharmacy  PCA walked her to the ED where she is going to drive herself home  All belongings accounted for including her cell phone

## 2021-02-08 NOTE — UTILIZATION REVIEW
Notification of Discharge  This is a Notification of Discharge from our facility [address]  Please be advised that this patient has been discharge from our facility  Below you will find the admission and discharge date and time including the patients disposition  PRESENTATION DATE: 2/5/2021 12:02 PM  OBS ADMISSION DATE:   IP ADMISSION DATE: 2/5/21 1214   DISCHARGE DATE: 2/7/2021  2:51 PM  DISPOSITION: Left against medical advice or discontinued care Left against medical advice or discontinued care   Admission Orders listed below:  Admission Orders (From admission, onward)     Ordered        02/05/21 1214  Inpatient Admission  Once                   Please contact the UR Department if additional information is required to close this patient's authorization/case  [address] [employer] Utilization Review Department  Main: [phone] x carefully listen to the prompts  All voicemails are confidential   [email or web address]  Send all requests for admission clinical reviews, approved or denied determinations and any other requests to dedicated fax number below belonging to the campus where the patient is receiving treatment   List of dedicated fax numbers:  [address] DENIALS (Administrative/Medical Necessity) [phone]   [address] (Maternity/NICU/Pediatrics) [phone]   [facility] [phone]   Sumi Lorenz [phone]   [facility] [phone]   [facility] [address] [facility] [phone]   [facility]  [phone]   [address] [facility] [address]  [address] [facility] [address] [facility] [phone]

## 2021-02-09 NOTE — NURSING NOTE
Pt called on 2/19/21 at , pt left bottle of ibuprofen  Pt requested that they be discarded will not be picking them up

## 2021-02-10 LAB
BACTERIA BLD CULT: NORMAL
BACTERIA BLD CULT: NORMAL
CALCIUM OXALATE DIHYDRATE MFR STONE IR: 20 %
COLOR STONE: NORMAL
COM MFR STONE: 80 %
COMMENT-STONE3: NORMAL
COMPOSITION: NORMAL
LABORATORY COMMENT REPORT: NORMAL
PHOTO: NORMAL
SIZE STONE: NORMAL MM
SPEC SOURCE SUBJ: NORMAL
STONE ANALYSIS-IMP: NORMAL
WT STONE: 40 MG

## 2021-04-26 ENCOUNTER — APPOINTMENT (EMERGENCY)
Dept: RADIOLOGY | Facility: HOSPITAL | Age: 51
End: 2021-04-26
Payer: COMMERCIAL

## 2021-04-26 ENCOUNTER — HOSPITAL ENCOUNTER (OUTPATIENT)
Facility: HOSPITAL | Age: 51
Setting detail: OBSERVATION
Discharge: HOME | End: 2021-04-27
Attending: EMERGENCY MEDICINE | Admitting: FAMILY MEDICINE
Payer: COMMERCIAL

## 2021-04-26 ENCOUNTER — APPOINTMENT (EMERGENCY)
Dept: CT IMAGING | Facility: HOSPITAL | Age: 51
End: 2021-04-26
Payer: COMMERCIAL

## 2021-04-26 DIAGNOSIS — I10 HYPERTENSION, UNSPECIFIED TYPE: ICD-10-CM

## 2021-04-26 DIAGNOSIS — R07.9 CHEST PAIN: Primary | ICD-10-CM

## 2021-04-26 DIAGNOSIS — E11.65 TYPE 2 DIABETES MELLITUS WITH HYPERGLYCEMIA, WITHOUT LONG-TERM CURRENT USE OF INSULIN (HCC): Chronic | ICD-10-CM

## 2021-04-26 DIAGNOSIS — R79.89 ABNORMAL SERUM THYROID STIMULATING HORMONE (TSH) LEVEL: ICD-10-CM

## 2021-04-26 DIAGNOSIS — R07.2 PRECORDIAL CHEST PAIN: ICD-10-CM

## 2021-04-26 LAB
ALBUMIN SERPL BCP-MCNC: 3.8 G/DL (ref 3.5–5)
ALP SERPL-CCNC: 104 U/L (ref 46–116)
ALT SERPL W P-5'-P-CCNC: 20 U/L (ref 12–78)
AMORPH PHOS CRY URNS QL MICRO: NORMAL /HPF
ANION GAP SERPL CALCULATED.3IONS-SCNC: 7 MMOL/L (ref 4–13)
AST SERPL W P-5'-P-CCNC: 11 U/L (ref 5–45)
BACTERIA UR QL AUTO: NORMAL /HPF
BASOPHILS # BLD AUTO: 0.06 THOUSANDS/ΜL (ref 0–0.1)
BASOPHILS NFR BLD AUTO: 1 % (ref 0–1)
BILIRUB SERPL-MCNC: 0.38 MG/DL (ref 0.2–1)
BILIRUB UR QL STRIP: NEGATIVE
BUN SERPL-MCNC: 12 MG/DL (ref 5–25)
CALCIUM SERPL-MCNC: 9.4 MG/DL (ref 8.3–10.1)
CHLORIDE SERPL-SCNC: 101 MMOL/L (ref 100–108)
CK SERPL-CCNC: 120 U/L (ref 26–192)
CLARITY UR: CLEAR
CO2 SERPL-SCNC: 30 MMOL/L (ref 21–32)
COLOR UR: YELLOW
CREAT SERPL-MCNC: 0.79 MG/DL (ref 0.6–1.3)
D DIMER PPP FEU-MCNC: 0.38 UG/ML FEU
EOSINOPHIL # BLD AUTO: 0.17 THOUSAND/ΜL (ref 0–0.61)
EOSINOPHIL NFR BLD AUTO: 2 % (ref 0–6)
ERYTHROCYTE [DISTWIDTH] IN BLOOD BY AUTOMATED COUNT: 12.5 % (ref 11.6–15.1)
GFR SERPL CREATININE-BSD FRML MDRD: 89 ML/MIN/1.73SQ M
GLUCOSE SERPL-MCNC: 195 MG/DL (ref 65–140)
GLUCOSE UR STRIP-MCNC: ABNORMAL MG/DL
HCG SERPL QL: NEGATIVE
HCT VFR BLD AUTO: 50 % (ref 34.8–46.1)
HGB BLD-MCNC: 17.1 G/DL (ref 11.5–15.4)
HGB UR QL STRIP.AUTO: ABNORMAL
IMM GRANULOCYTES # BLD AUTO: 0.07 THOUSAND/UL (ref 0–0.2)
IMM GRANULOCYTES NFR BLD AUTO: 1 % (ref 0–2)
INR PPP: 0.95 (ref 0.84–1.19)
KETONES UR STRIP-MCNC: NEGATIVE MG/DL
LEUKOCYTE ESTERASE UR QL STRIP: NEGATIVE
LIPASE SERPL-CCNC: 140 U/L (ref 73–393)
LYMPHOCYTES # BLD AUTO: 3.68 THOUSANDS/ΜL (ref 0.6–4.47)
LYMPHOCYTES NFR BLD AUTO: 35 % (ref 14–44)
MAGNESIUM SERPL-MCNC: 2.1 MG/DL (ref 1.6–2.6)
MCH RBC QN AUTO: 31.1 PG (ref 26.8–34.3)
MCHC RBC AUTO-ENTMCNC: 34.2 G/DL (ref 31.4–37.4)
MCV RBC AUTO: 91 FL (ref 82–98)
MONOCYTES # BLD AUTO: 0.6 THOUSAND/ΜL (ref 0.17–1.22)
MONOCYTES NFR BLD AUTO: 6 % (ref 4–12)
NEUTROPHILS # BLD AUTO: 5.83 THOUSANDS/ΜL (ref 1.85–7.62)
NEUTS SEG NFR BLD AUTO: 55 % (ref 43–75)
NITRITE UR QL STRIP: NEGATIVE
NON-SQ EPI CELLS URNS QL MICRO: NORMAL /HPF
NRBC BLD AUTO-RTO: 0 /100 WBCS
PH UR STRIP.AUTO: 7 [PH]
PLATELET # BLD AUTO: 228 THOUSANDS/UL (ref 149–390)
PMV BLD AUTO: 8.7 FL (ref 8.9–12.7)
POTASSIUM SERPL-SCNC: 3.6 MMOL/L (ref 3.5–5.3)
PROT SERPL-MCNC: 8.2 G/DL (ref 6.4–8.2)
PROT UR STRIP-MCNC: NEGATIVE MG/DL
PROTHROMBIN TIME: 12.5 SECONDS (ref 11.6–14.5)
RBC # BLD AUTO: 5.49 MILLION/UL (ref 3.81–5.12)
RBC #/AREA URNS AUTO: NORMAL /HPF
SODIUM SERPL-SCNC: 138 MMOL/L (ref 136–145)
SP GR UR STRIP.AUTO: 1.01 (ref 1–1.03)
TROPONIN I SERPL-MCNC: <0.02 NG/ML
TSH SERPL DL<=0.05 MIU/L-ACNC: 8.32 UIU/ML (ref 0.36–3.74)
UROBILINOGEN UR QL STRIP.AUTO: 0.2 E.U./DL
WBC # BLD AUTO: 10.41 THOUSAND/UL (ref 4.31–10.16)
WBC #/AREA URNS AUTO: NORMAL /HPF

## 2021-04-26 PROCEDURE — 85025 COMPLETE CBC W/AUTO DIFF WBC: CPT | Performed by: EMERGENCY MEDICINE

## 2021-04-26 PROCEDURE — 84443 ASSAY THYROID STIM HORMONE: CPT | Performed by: EMERGENCY MEDICINE

## 2021-04-26 PROCEDURE — 84703 CHORIONIC GONADOTROPIN ASSAY: CPT | Performed by: EMERGENCY MEDICINE

## 2021-04-26 PROCEDURE — 83690 ASSAY OF LIPASE: CPT | Performed by: EMERGENCY MEDICINE

## 2021-04-26 PROCEDURE — 99285 EMERGENCY DEPT VISIT HI MDM: CPT | Performed by: EMERGENCY MEDICINE

## 2021-04-26 PROCEDURE — 80307 DRUG TEST PRSMV CHEM ANLYZR: CPT | Performed by: FAMILY MEDICINE

## 2021-04-26 PROCEDURE — 93005 ELECTROCARDIOGRAM TRACING: CPT

## 2021-04-26 PROCEDURE — 70450 CT HEAD/BRAIN W/O DYE: CPT

## 2021-04-26 PROCEDURE — 85379 FIBRIN DEGRADATION QUANT: CPT | Performed by: EMERGENCY MEDICINE

## 2021-04-26 PROCEDURE — 71045 X-RAY EXAM CHEST 1 VIEW: CPT

## 2021-04-26 PROCEDURE — G1004 CDSM NDSC: HCPCS

## 2021-04-26 PROCEDURE — 81001 URINALYSIS AUTO W/SCOPE: CPT | Performed by: EMERGENCY MEDICINE

## 2021-04-26 PROCEDURE — 96374 THER/PROPH/DIAG INJ IV PUSH: CPT

## 2021-04-26 PROCEDURE — 83735 ASSAY OF MAGNESIUM: CPT | Performed by: EMERGENCY MEDICINE

## 2021-04-26 PROCEDURE — 36415 COLL VENOUS BLD VENIPUNCTURE: CPT | Performed by: EMERGENCY MEDICINE

## 2021-04-26 PROCEDURE — 80053 COMPREHEN METABOLIC PANEL: CPT | Performed by: EMERGENCY MEDICINE

## 2021-04-26 PROCEDURE — 82550 ASSAY OF CK (CPK): CPT | Performed by: EMERGENCY MEDICINE

## 2021-04-26 PROCEDURE — 85610 PROTHROMBIN TIME: CPT | Performed by: EMERGENCY MEDICINE

## 2021-04-26 PROCEDURE — 84484 ASSAY OF TROPONIN QUANT: CPT | Performed by: EMERGENCY MEDICINE

## 2021-04-26 PROCEDURE — 96361 HYDRATE IV INFUSION ADD-ON: CPT

## 2021-04-26 PROCEDURE — 99285 EMERGENCY DEPT VISIT HI MDM: CPT

## 2021-04-26 RX ORDER — LABETALOL 20 MG/4 ML (5 MG/ML) INTRAVENOUS SYRINGE
10 ONCE
Status: COMPLETED | OUTPATIENT
Start: 2021-04-26 | End: 2021-04-27

## 2021-04-26 RX ORDER — ASPIRIN 81 MG/1
324 TABLET, CHEWABLE ORAL ONCE
Status: COMPLETED | OUTPATIENT
Start: 2021-04-26 | End: 2021-04-26

## 2021-04-26 RX ORDER — SODIUM CHLORIDE 9 MG/ML
3 INJECTION INTRAVENOUS
Status: DISCONTINUED | OUTPATIENT
Start: 2021-04-26 | End: 2021-04-27 | Stop reason: HOSPADM

## 2021-04-26 RX ORDER — LABETALOL 20 MG/4 ML (5 MG/ML) INTRAVENOUS SYRINGE
10 ONCE
Status: COMPLETED | OUTPATIENT
Start: 2021-04-26 | End: 2021-04-26

## 2021-04-26 RX ORDER — NITROGLYCERIN 0.4 MG/1
0.4 TABLET SUBLINGUAL ONCE
Status: COMPLETED | OUTPATIENT
Start: 2021-04-26 | End: 2021-04-26

## 2021-04-26 RX ADMIN — LABETALOL 20 MG/4 ML (5 MG/ML) INTRAVENOUS SYRINGE 10 MG: at 23:04

## 2021-04-26 RX ADMIN — SODIUM CHLORIDE 1000 ML: 0.9 INJECTION, SOLUTION INTRAVENOUS at 23:01

## 2021-04-26 RX ADMIN — NITROGLYCERIN 0.4 MG: 0.4 TABLET SUBLINGUAL at 23:34

## 2021-04-26 RX ADMIN — ASPIRIN 81 MG CHEWABLE TABLET 324 MG: 81 TABLET CHEWABLE at 23:02

## 2021-04-26 NOTE — Clinical Note
Case was discussed with  and the patient's admission status was agreed to be Admission Status: observation status to the service of

## 2021-04-27 ENCOUNTER — APPOINTMENT (OUTPATIENT)
Dept: ULTRASOUND IMAGING | Facility: HOSPITAL | Age: 51
End: 2021-04-27
Payer: COMMERCIAL

## 2021-04-27 VITALS
HEIGHT: 63 IN | SYSTOLIC BLOOD PRESSURE: 146 MMHG | BODY MASS INDEX: 40.82 KG/M2 | DIASTOLIC BLOOD PRESSURE: 81 MMHG | OXYGEN SATURATION: 93 % | HEART RATE: 77 BPM | TEMPERATURE: 98.2 F | RESPIRATION RATE: 18 BRPM | WEIGHT: 230.4 LBS

## 2021-04-27 PROBLEM — I16.0 HYPERTENSIVE URGENCY: Status: ACTIVE | Noted: 2021-04-27

## 2021-04-27 PROBLEM — Z91.11 NONCOMPLIANCE WITH TREATMENT PLAN: Chronic | Status: ACTIVE | Noted: 2021-04-27

## 2021-04-27 PROBLEM — R79.89 ELEVATED TSH: Status: ACTIVE | Noted: 2021-04-27

## 2021-04-27 PROBLEM — R07.2 PRECORDIAL CHEST PAIN: Status: ACTIVE | Noted: 2021-04-27

## 2021-04-27 PROBLEM — R10.11 RUQ ABDOMINAL PAIN: Status: ACTIVE | Noted: 2021-04-27

## 2021-04-27 PROBLEM — E11.69 DYSLIPIDEMIA ASSOCIATED WITH TYPE 2 DIABETES MELLITUS (HCC): Chronic | Status: ACTIVE | Noted: 2021-04-27

## 2021-04-27 PROBLEM — E78.5 DYSLIPIDEMIA ASSOCIATED WITH TYPE 2 DIABETES MELLITUS (HCC): Chronic | Status: ACTIVE | Noted: 2021-04-27

## 2021-04-27 PROBLEM — Z91.199 NONCOMPLIANCE WITH TREATMENT PLAN: Chronic | Status: ACTIVE | Noted: 2021-04-27

## 2021-04-27 PROBLEM — K76.0 HEPATIC STEATOSIS: Status: ACTIVE | Noted: 2021-04-27

## 2021-04-27 PROBLEM — E66.01 MORBID OBESITY WITH BMI OF 40.0-44.9, ADULT (HCC): Chronic | Status: ACTIVE | Noted: 2021-04-27

## 2021-04-27 PROBLEM — E11.65 TYPE 2 DIABETES MELLITUS WITH HYPERGLYCEMIA, WITHOUT LONG-TERM CURRENT USE OF INSULIN (HCC): Chronic | Status: ACTIVE | Noted: 2021-02-03

## 2021-04-27 LAB
ALBUMIN SERPL BCP-MCNC: 3.3 G/DL (ref 3.5–5)
ALP SERPL-CCNC: 95 U/L (ref 46–116)
ALT SERPL W P-5'-P-CCNC: 15 U/L (ref 12–78)
AMPHETAMINES SERPL QL SCN: NEGATIVE
ANION GAP SERPL CALCULATED.3IONS-SCNC: 9 MMOL/L (ref 4–13)
AST SERPL W P-5'-P-CCNC: 7 U/L (ref 5–45)
BARBITURATES UR QL: NEGATIVE
BASOPHILS # BLD AUTO: 0.06 THOUSANDS/ΜL (ref 0–0.1)
BASOPHILS NFR BLD AUTO: 1 % (ref 0–1)
BENZODIAZ UR QL: NEGATIVE
BILIRUB SERPL-MCNC: 0.41 MG/DL (ref 0.2–1)
BUN SERPL-MCNC: 11 MG/DL (ref 5–25)
CALCIUM ALBUM COR SERPL-MCNC: 9.4 MG/DL (ref 8.3–10.1)
CALCIUM SERPL-MCNC: 8.8 MG/DL (ref 8.3–10.1)
CHLORIDE SERPL-SCNC: 105 MMOL/L (ref 100–108)
CHOLEST SERPL-MCNC: 213 MG/DL (ref 50–200)
CO2 SERPL-SCNC: 24 MMOL/L (ref 21–32)
COCAINE UR QL: NEGATIVE
CREAT SERPL-MCNC: 0.71 MG/DL (ref 0.6–1.3)
EOSINOPHIL # BLD AUTO: 0.1 THOUSAND/ΜL (ref 0–0.61)
EOSINOPHIL NFR BLD AUTO: 1 % (ref 0–6)
ERYTHROCYTE [DISTWIDTH] IN BLOOD BY AUTOMATED COUNT: 12.7 % (ref 11.6–15.1)
GFR SERPL CREATININE-BSD FRML MDRD: 101 ML/MIN/1.73SQ M
GLUCOSE SERPL-MCNC: 113 MG/DL (ref 65–140)
GLUCOSE SERPL-MCNC: 152 MG/DL (ref 65–140)
GLUCOSE SERPL-MCNC: 161 MG/DL (ref 65–140)
HCT VFR BLD AUTO: 45.3 % (ref 34.8–46.1)
HDLC SERPL-MCNC: 32 MG/DL
HGB BLD-MCNC: 15.4 G/DL (ref 11.5–15.4)
IMM GRANULOCYTES # BLD AUTO: 0.03 THOUSAND/UL (ref 0–0.2)
IMM GRANULOCYTES NFR BLD AUTO: 0 % (ref 0–2)
INR PPP: 0.92 (ref 0.84–1.19)
LDLC SERPL CALC-MCNC: 134 MG/DL (ref 0–100)
LIPASE SERPL-CCNC: 61 U/L (ref 73–393)
LYMPHOCYTES # BLD AUTO: 2.56 THOUSANDS/ΜL (ref 0.6–4.47)
LYMPHOCYTES NFR BLD AUTO: 32 % (ref 14–44)
MAGNESIUM SERPL-MCNC: 2 MG/DL (ref 1.6–2.6)
MCH RBC QN AUTO: 31.2 PG (ref 26.8–34.3)
MCHC RBC AUTO-ENTMCNC: 34 G/DL (ref 31.4–37.4)
MCV RBC AUTO: 92 FL (ref 82–98)
METHADONE UR QL: NEGATIVE
MONOCYTES # BLD AUTO: 0.49 THOUSAND/ΜL (ref 0.17–1.22)
MONOCYTES NFR BLD AUTO: 6 % (ref 4–12)
NEUTROPHILS # BLD AUTO: 4.78 THOUSANDS/ΜL (ref 1.85–7.62)
NEUTS SEG NFR BLD AUTO: 60 % (ref 43–75)
NRBC BLD AUTO-RTO: 0 /100 WBCS
OPIATES UR QL SCN: NEGATIVE
OXYCODONE+OXYMORPHONE UR QL SCN: NEGATIVE
PCP UR QL: NEGATIVE
PHOSPHATE SERPL-MCNC: 3.6 MG/DL (ref 2.7–4.5)
PLATELET # BLD AUTO: 212 THOUSANDS/UL (ref 149–390)
PMV BLD AUTO: 8.8 FL (ref 8.9–12.7)
POTASSIUM SERPL-SCNC: 4 MMOL/L (ref 3.5–5.3)
PROT SERPL-MCNC: 7.2 G/DL (ref 6.4–8.2)
PROTHROMBIN TIME: 12.2 SECONDS (ref 11.6–14.5)
RBC # BLD AUTO: 4.94 MILLION/UL (ref 3.81–5.12)
SODIUM SERPL-SCNC: 138 MMOL/L (ref 136–145)
THC UR QL: NEGATIVE
TRIGL SERPL-MCNC: 234 MG/DL
TROPONIN I SERPL-MCNC: <0.02 NG/ML
TROPONIN I SERPL-MCNC: <0.02 NG/ML
WBC # BLD AUTO: 8.02 THOUSAND/UL (ref 4.31–10.16)

## 2021-04-27 PROCEDURE — NC001 PR NO CHARGE: Performed by: FAMILY MEDICINE

## 2021-04-27 PROCEDURE — 99236 HOSP IP/OBS SAME DATE HI 85: CPT | Performed by: FAMILY MEDICINE

## 2021-04-27 PROCEDURE — 85025 COMPLETE CBC W/AUTO DIFF WBC: CPT | Performed by: FAMILY MEDICINE

## 2021-04-27 PROCEDURE — 85610 PROTHROMBIN TIME: CPT | Performed by: FAMILY MEDICINE

## 2021-04-27 PROCEDURE — 82948 REAGENT STRIP/BLOOD GLUCOSE: CPT

## 2021-04-27 PROCEDURE — 84100 ASSAY OF PHOSPHORUS: CPT | Performed by: FAMILY MEDICINE

## 2021-04-27 PROCEDURE — 83690 ASSAY OF LIPASE: CPT | Performed by: FAMILY MEDICINE

## 2021-04-27 PROCEDURE — 76705 ECHO EXAM OF ABDOMEN: CPT

## 2021-04-27 PROCEDURE — 80061 LIPID PANEL: CPT | Performed by: FAMILY MEDICINE

## 2021-04-27 PROCEDURE — 96376 TX/PRO/DX INJ SAME DRUG ADON: CPT

## 2021-04-27 PROCEDURE — 84484 ASSAY OF TROPONIN QUANT: CPT | Performed by: EMERGENCY MEDICINE

## 2021-04-27 PROCEDURE — 83735 ASSAY OF MAGNESIUM: CPT | Performed by: FAMILY MEDICINE

## 2021-04-27 PROCEDURE — 80053 COMPREHEN METABOLIC PANEL: CPT | Performed by: FAMILY MEDICINE

## 2021-04-27 PROCEDURE — 84484 ASSAY OF TROPONIN QUANT: CPT | Performed by: FAMILY MEDICINE

## 2021-04-27 RX ORDER — ACETAMINOPHEN 325 MG/1
650 TABLET ORAL EVERY 6 HOURS PRN
Status: DISCONTINUED | OUTPATIENT
Start: 2021-04-27 | End: 2021-04-27 | Stop reason: HOSPADM

## 2021-04-27 RX ORDER — LABETALOL 20 MG/4 ML (5 MG/ML) INTRAVENOUS SYRINGE
10 EVERY 6 HOURS PRN
Status: DISCONTINUED | OUTPATIENT
Start: 2021-04-27 | End: 2021-04-27 | Stop reason: HOSPADM

## 2021-04-27 RX ORDER — ASPIRIN 81 MG/1
81 TABLET ORAL DAILY
Qty: 30 TABLET | Refills: 0 | Status: SHIPPED | OUTPATIENT
Start: 2021-04-27 | End: 2021-05-27

## 2021-04-27 RX ORDER — DOCUSATE SODIUM 100 MG/1
100 CAPSULE, LIQUID FILLED ORAL 2 TIMES DAILY PRN
Status: DISCONTINUED | OUTPATIENT
Start: 2021-04-27 | End: 2021-04-27 | Stop reason: HOSPADM

## 2021-04-27 RX ORDER — NITROGLYCERIN 0.4 MG/1
0.4 TABLET SUBLINGUAL
Qty: 25 TABLET | Refills: 0 | Status: SHIPPED | OUTPATIENT
Start: 2021-04-27

## 2021-04-27 RX ORDER — HEPARIN SODIUM 5000 [USP'U]/ML
5000 INJECTION, SOLUTION INTRAVENOUS; SUBCUTANEOUS EVERY 8 HOURS SCHEDULED
Status: DISCONTINUED | OUTPATIENT
Start: 2021-04-27 | End: 2021-04-27 | Stop reason: HOSPADM

## 2021-04-27 RX ORDER — MAGNESIUM HYDROXIDE/ALUMINUM HYDROXICE/SIMETHICONE 120; 1200; 1200 MG/30ML; MG/30ML; MG/30ML
30 SUSPENSION ORAL EVERY 4 HOURS PRN
Status: DISCONTINUED | OUTPATIENT
Start: 2021-04-27 | End: 2021-04-27 | Stop reason: HOSPADM

## 2021-04-27 RX ORDER — ONDANSETRON 2 MG/ML
4 INJECTION INTRAMUSCULAR; INTRAVENOUS EVERY 4 HOURS PRN
Status: DISCONTINUED | OUTPATIENT
Start: 2021-04-27 | End: 2021-04-27 | Stop reason: HOSPADM

## 2021-04-27 RX ADMIN — HEPARIN SODIUM 5000 UNITS: 5000 INJECTION INTRAVENOUS; SUBCUTANEOUS at 05:50

## 2021-04-27 RX ADMIN — LABETALOL 20 MG/4 ML (5 MG/ML) INTRAVENOUS SYRINGE 10 MG: at 00:02

## 2021-04-27 NOTE — ASSESSMENT & PLAN NOTE
Lab Results   Component Value Date    HGBA1C 7 7 (H) 02/03/2021       No results for input(s): POCGLU in the last 72 hours  Blood Sugar Average: Last 72 hrs:     Recent diagnosis with low HDL and high HDL in presence of type 2 diabetes  Patient declined treatment with medications during recent primary care appointment, preferring to treat with lifestyle modification for now  Provide appropriate counseling and encourage close outpatient follow-up

## 2021-04-27 NOTE — NURSING NOTE
Patient discharged home today, IV removed intact, no bleeding noted; AVS printed and reviewed with patient, understanding verbalized  Patient ambulates per her request to exit accompanied by PCA and pt spouse

## 2021-04-27 NOTE — ASSESSMENT & PLAN NOTE
Lab Results   Component Value Date    HGBA1C 7 7 (H) 02/03/2021       No results for input(s): POCGLU in the last 72 hours  Blood Sugar Average: Last 72 hrs:     Diabetes recently diagnosed  Hemoglobin A1c above goal (less than 7)  Patient previously declined treatment with medications during primary care appointment, preferring to treat with lifestyle modification for now  Initiate insulin protocol with blood glucose AC and HS  Provide diabetes education and encourage close outpatient follow-up

## 2021-04-27 NOTE — H&P
H&P Exam - Chi Tripp 52 y o  female MRN: [id number]    Unit/Bed#: ED 12 Encounter: [id number]      Assessment/Plan       * Precordial chest pain  Assessment & Plan  Patient with multiple untreated medical conditions presenting with acute onset chest and RUQ pain in setting of elevated BP (220s/120s)  Asymptomatic following initial treatment in ER  Patient has multiple CAD risk factors but otherwise meets criteria for low risk ACS observation  Continue clinical monitoring, serial troponin, and telemetry  Also consider GI etiology, as initial symptoms were more right-sided and patient has mild RUQ tenderness  See diagnosis of RUQ pain for further detail  RUQ abdominal pain  Assessment & Plan  RUQ abdominal pain concerning for biliary colic  No acute surgical indication  No prior known biliary pathology, though patient reports frequent "heartburn" with tomato-based sauces and other spicy/fatty foods  RUQ US ordered to evaluate further  Consider further general surgery or GI evaluation pending results, likely as outpatient  Elevated TSH  Assessment & Plan  Mildly elevated TSH (8 5) at time of presentation  No prior lab results available; unclear chronicity  Patient does not appear to have any associated symptoms  Possible mild hypothyroidism vs acute reaction  Recommend retest no earlier than 4 weeks from now  Dyslipidemia associated with type 2 diabetes mellitus ([facility] [address] )  Assessment & Plan  Lab Results   Component Value Date    HGBA1C 7 7 (H) 02/03/2021       No results for input(s): POCGLU in the last 72 hours  Blood Sugar Average: Last 72 hrs:     Recent diagnosis with low HDL and high HDL in presence of type 2 diabetes  Patient declined treatment with medications during recent primary care appointment, preferring to treat with lifestyle modification for now  Provide appropriate counseling and encourage close outpatient follow-up        Noncompliance with treatment plan  Assessment & Plan  Patient has multiple chronic medical conditions which are likely to be exacerbated by noncompliance with medical recommendations  She also has a recent history of signing out from hospital AMA and declining evidence-based treatment for medical issues  Discussed importance of adherence to medical guidance in order to optimize health  Will work with case management to mitigate obstacles regarding health care  Morbid obesity with BMI of 40 0-44 9, adult ([facility])  Assessment & Plan  BMI 41 4  Consider consult to dietitian  Encourage lifestyle modifications  Hypertensive urgency  Assessment & Plan  Patient presented to ER with /121  Improved with labetalol 10mg IV (2 doses total given in ER)  Patient has little history in electronic record, but prior BP also elevated to roughly 160s/90s  She has previously declined medications as outpatient, preferring to treat with lifestyle modifications for now  Will continue labetalol PRN overnight  Recommend readdress importance of treatment for hypertension and encourage to start daily medication  Tobacco abuse  Assessment & Plan  Chronic condition  Encouraged cessation; patient declines assistance at this time  Offer nicotine patch during admission  Type 2 diabetes mellitus with hyperglycemia, without long-term current use of insulin ([facility])  Assessment & Plan  Lab Results   Component Value Date    HGBA1C 7 7 (H) 02/03/2021       No results for input(s): POCGLU in the last 72 hours  Blood Sugar Average: Last 72 hrs:     Diabetes recently diagnosed  Hemoglobin A1c above goal (less than 7)  Patient previously declined treatment with medications during primary care appointment, preferring to treat with lifestyle modification for now  Initiate insulin protocol with blood glucose AC and HS  Provide diabetes education and encourage close outpatient follow-up        History of Present Illness    HPI:  Rigoberto Roman is a 52 y o  female who presents with sudden onset chest pain at approximately 8 p m  on evening of admission  Patient was laying in bed at onset of chest pain, and had eaten a stromboli for dinner a short time earlier  She reports pain initially ran across her lower costal margin, then radiated to her chest and right upper back  Pain exacerbated by deep breaths, but no associated shortness of breath, cough, or palpitations  No recent leg swelling  No exertional or positional component  Patient was treated with aspirin and nitroglycerin by ER  She was also treated with labetalol for hypertension (220s/120s)  Unclear whether this treatment reduced chest pain, but patient does report full resolution of pain at time of evaluation  PCP: BRETT Boucher    Review of Systems   All other systems reviewed and are negative  Historical Information   Past Medical History:   Diagnosis Date    Dyslipidemia associated with type 2 diabetes mellitus ([facility] [address] ) 2021    Elevated TSH 2021    High cholesterol     Hypertensive urgency 2021    Morbid obesity with BMI of 40 0-44 9, adult ([address] ) 2021    Noncompliance with treatment plan 2021    Precordial chest pain 2021    Type 2 diabetes mellitus with hyperglycemia, without long-term current use of insulin ([address] ) 2/3/2021     Past Surgical History:   Procedure Laterality Date    APPENDECTOMY       SECTION      DILATION AND CURETTAGE OF UTERUS       Social History   Social History     Substance and Sexual Activity   Alcohol Use Never    Frequency: Never     Social History     Substance and Sexual Activity   Drug Use Never     Social History     Tobacco Use   Smoking Status Current Every Day Smoker    Packs/day: 1 00    Types: Cigarettes   Smokeless Tobacco Never Used     E-Cigarette/Vaping    E-Cigarette Use Never User       E-Cigarette/Vaping Substances     Family History: History reviewed   No pertinent family history  Meds/Allergies   all medications and allergies reviewed  Allergies   Allergen Reactions    Amoxicillin Hives and GI Intolerance    Lipitor [Atorvastatin] Chest Pain       Objective   Vitals: Blood pressure 159/84, pulse 93, temperature (!) 97 2 °F (36 2 °C), temperature source Temporal, resp  rate 17, height 5' 3" (1 6 m), weight 106 kg (233 lb 11 oz), last menstrual period 04/23/2021, SpO2 96 %  No intake or output data in the 24 hours ending 04/27/21 0031    Invasive Devices     Peripheral Intravenous Line            Peripheral IV 04/26/21 Right Antecubital less than 1 day                Physical Exam  Vitals signs and nursing note reviewed  Constitutional:       General: She is not in acute distress  Appearance: She is well-developed  She is obese  She is not diaphoretic  Comments: Morbidly obese female lying in hospital bed  Alert, conversant, no apparent distress  HENT:      Head: Normocephalic and atraumatic  Right Ear: External ear normal       Left Ear: External ear normal       Nose: Nose normal  No congestion or rhinorrhea  Mouth/Throat:      Mouth: Mucous membranes are moist       Pharynx: Oropharynx is clear  No oropharyngeal exudate or posterior oropharyngeal erythema  Eyes:      General: No scleral icterus  Right eye: No discharge  Left eye: No discharge  Pupils: Pupils are equal, round, and reactive to light  Neck:      Musculoskeletal: Neck supple  No neck rigidity or muscular tenderness  Vascular: No JVD  Trachea: No tracheal deviation  Cardiovascular:      Rate and Rhythm: Normal rate and regular rhythm  Pulses: Normal pulses  Heart sounds: Heart sounds are distant  No murmur  No friction rub  No gallop  Pulmonary:      Effort: Pulmonary effort is normal  No respiratory distress  Breath sounds: Normal breath sounds  No stridor  No wheezing, rhonchi or rales  Chest:      Chest wall: No tenderness  Abdominal:      General: Bowel sounds are normal  There is no distension  Palpations: Abdomen is soft  There is no mass  Tenderness: There is no abdominal tenderness  There is no right CVA tenderness, left CVA tenderness, guarding or rebound  Musculoskeletal:         General: No swelling, tenderness, deformity or signs of injury  Right lower leg: No edema  Left lower leg: No edema  Lymphadenopathy:      Cervical: No cervical adenopathy  Skin:     General: Skin is warm and dry  Capillary Refill: Capillary refill takes less than 2 seconds  Coloration: Skin is not jaundiced or pale  Findings: No bruising, erythema, lesion or rash  Neurological:      Mental Status: She is alert and oriented to person, place, and time  GCS: GCS eye subscore is 4  GCS verbal subscore is 5  GCS motor subscore is 6  Cranial Nerves: Cranial nerves are intact  No dysarthria or facial asymmetry  Sensory: No sensory deficit  Motor: No weakness, tremor, atrophy, abnormal muscle tone, seizure activity or pronator drift  Coordination: Coordination normal       Deep Tendon Reflexes: Reflexes are normal and symmetric  Psychiatric:         Attention and Perception: Attention and perception normal          Mood and Affect: Mood and affect normal          Speech: Speech normal          Behavior: Behavior normal          Thought Content: Thought content normal          Cognition and Memory: Cognition and memory normal          Judgment: Judgment normal      Lab Results: I have personally reviewed pertinent reports  Imaging: I have personally reviewed pertinent reports  and I have personally reviewed pertinent films in PACS  EKG, Pathology, and Other Studies: I have personally reviewed pertinent reports     and I have personally reviewed pertinent films in PACS    Code Status: Level 1 - Full Code  Advance Directive and Living Will:      Power of :    POLST: Counseling / Coordination of Care  Total floor / unit time spent today 45 minutes  Greater than 50% of total time was spent with the patient and / or family counseling and / or coordination of care  A description of the counseling / coordination of care:  Greater than 25 minutes spent with this patient discussing diagnosis, prognosis, and plan of care

## 2021-04-27 NOTE — PLAN OF CARE
Problem: PAIN - ADULT  Goal: Verbalizes/displays adequate comfort level or baseline comfort level  Description: Interventions:  - Encourage patient to monitor pain and request assistance  - Assess pain using appropriate pain scale  - Administer analgesics based on type and severity of pain and evaluate response  - Implement non-pharmacological measures as appropriate and evaluate response  - Consider cultural and social influences on pain and pain management  - Notify physician/advanced practitioner if interventions unsuccessful or patient reports new pain  Outcome: Progressing     Problem: INFECTION - ADULT  Goal: Absence or prevention of progression during hospitalization  Description: INTERVENTIONS:  - Assess and monitor for signs and symptoms of infection  - Monitor lab/diagnostic results  - Monitor all insertion sites, i e  indwelling lines, tubes, and drains  - Monitor endotracheal if appropriate and nasal secretions for changes in amount and color  - [place] appropriate cooling/warming therapies per order  - Administer medications as ordered  - Instruct and encourage patient and family to use good hand hygiene technique  - Identify and instruct in appropriate isolation precautions for identified infection/condition  Outcome: Progressing  Goal: Absence of fever/infection during neutropenic period  Description: INTERVENTIONS:  - Monitor WBC    Outcome: Progressing     Problem: SAFETY ADULT  Goal: Patient will remain free of falls  Description: INTERVENTIONS:  - Assess patient frequently for physical needs  -  Identify cognitive and physical deficits and behaviors that affect risk of falls    -  [place] fall precautions as indicated by assessment   - Educate patient/family on patient safety including physical limitations  - Instruct patient to call for assistance with activity based on assessment  - Modify environment to reduce risk of injury  - Consider OT/PT consult to assist with strengthening/mobility  Outcome: Progressing  Goal: Maintain or return to baseline ADL function  Description: INTERVENTIONS:  -  Assess patient's ability to carry out ADLs; assess patient's baseline for ADL function and identify physical deficits which impact ability to perform ADLs (bathing, care of mouth/teeth, toileting, grooming, dressing, etc )  - Assess/evaluate cause of self-care deficits   - Assess range of motion  - Assess patient's mobility; develop plan if impaired  - Assess patient's need for assistive devices and provide as appropriate  - Encourage maximum independence but intervene and supervise when necessary  - Involve family in performance of ADLs  - Assess for home care needs following discharge   - Consider OT consult to assist with ADL evaluation and planning for discharge  - Provide patient education as appropriate  Outcome: Progressing  Goal: Maintain or return mobility status to optimal level  Description: INTERVENTIONS:  - Assess patient's baseline mobility status (ambulation, transfers, stairs, etc )    - Identify cognitive and physical deficits and behaviors that affect mobility  - Identify mobility aids required to assist with transfers and/or ambulation (gait belt, sit-to-stand, lift, walker, cane, etc )  - [place] fall precautions as indicated by assessment  - Record patient progress and toleration of activity level on Mobility SBAR; progress patient to next Phase/Stage  - Instruct patient to call for assistance with activity based on assessment  - Consider rehabilitation consult to assist with strengthening/weightbearing, etc   Outcome: Progressing     Problem: DISCHARGE PLANNING  Goal: Discharge to home or other facility with appropriate resources  Description: INTERVENTIONS:  - Identify barriers to discharge w/patient and caregiver  - Arrange for needed discharge resources and transportation as appropriate  - Identify discharge learning needs (meds, wound care, etc )  - Arrange for interpretive services to assist at discharge as needed  - Refer to Case Management Department for coordinating discharge planning if the patient needs post-hospital services based on physician/advanced practitioner order or complex needs related to functional status, cognitive ability, or social support system  Outcome: Progressing     Problem: Knowledge Deficit  Goal: Patient/family/caregiver demonstrates understanding of disease process, treatment plan, medications, and discharge instructions  Description: Complete learning assessment and assess knowledge base    Interventions:  - Provide teaching at level of understanding  - Provide teaching via preferred learning methods  Outcome: Progressing     Problem: NEUROSENSORY - ADULT  Goal: Achieves stable or improved neurological status  Description: INTERVENTIONS  - Monitor and report changes in neurological status  - Monitor vital signs such as temperature, blood pressure, glucose, and any other labs ordered   - Initiate measures to prevent increased intracranial pressure  - Monitor for seizure activity and implement precautions if appropriate      Outcome: Progressing  Goal: Remains free of injury related to seizures activity  Description: INTERVENTIONS  - Maintain airway, patient safety  and administer oxygen as ordered  - Monitor patient for seizure activity, document and report duration and description of seizure to physician/advanced practitioner  - If seizure occurs,  ensure patient safety during seizure  - Reorient patient post seizure  - Seizure pads on all 4 side rails  - Instruct patient/family to notify RN of any seizure activity including if an aura is experienced  - Instruct patient/family to call for assistance with activity based on nursing assessment  - Administer anti-seizure medications if ordered    Outcome: Progressing  Goal: Achieves maximal functionality and self care  Description: INTERVENTIONS  - Monitor swallowing and airway patency with patient fatigue and changes in neurological status  - Encourage and assist patient to increase activity and self care     - Encourage visually impaired, hearing impaired and aphasic patients to use assistive/communication devices  Outcome: Progressing     Problem: CARDIOVASCULAR - ADULT  Goal: Maintains optimal cardiac output and hemodynamic stability  Description: INTERVENTIONS:  - Monitor I/O, vital signs and rhythm  - Monitor for S/S and trends of decreased cardiac output  - Administer and titrate ordered vasoactive medications to optimize hemodynamic stability  - Assess quality of pulses, skin color and temperature  - Assess for signs of decreased coronary artery perfusion  - Instruct patient to report change in severity of symptoms  Outcome: Progressing  Goal: Absence of cardiac dysrhythmias or at baseline rhythm  Description: INTERVENTIONS:  - Continuous cardiac monitoring, vital signs, obtain 12 lead EKG if ordered  - Administer antiarrhythmic and heart rate control medications as ordered  - Monitor electrolytes and administer replacement therapy as ordered  Outcome: Progressing     Problem: RESPIRATORY - ADULT  Goal: Achieves optimal ventilation and oxygenation  Description: INTERVENTIONS:  - Assess for changes in respiratory status  - Assess for changes in mentation and behavior  - Position to facilitate oxygenation and minimize respiratory effort  - Oxygen administered by appropriate delivery if ordered  - Initiate smoking cessation education as indicated  - Encourage broncho-pulmonary hygiene including cough, deep breathe, Incentive Spirometry  - Assess the need for suctioning and aspirate as needed  - Assess and instruct to report SOB or any respiratory difficulty  - Respiratory Therapy support as indicated  Outcome: Progressing     Problem: GASTROINTESTINAL - ADULT  Goal: Minimal or absence of nausea and/or vomiting  Description: INTERVENTIONS:  - Administer IV fluids if ordered to ensure adequate hydration  - Maintain NPO status until nausea and vomiting are resolved  - Nasogastric tube if ordered  - Administer ordered antiemetic medications as needed  - Provide nonpharmacologic comfort measures as appropriate  - Advance diet as tolerated, if ordered  - Consider nutrition services referral to assist patient with adequate nutrition and appropriate food choices  Outcome: Progressing  Goal: Maintains or returns to baseline bowel function  Description: INTERVENTIONS:  - Assess bowel function  - Encourage oral fluids to ensure adequate hydration  - Administer IV fluids if ordered to ensure adequate hydration  - Administer ordered medications as needed  - Encourage mobilization and activity  - Consider nutritional services referral to assist patient with adequate nutrition and appropriate food choices  Outcome: Progressing  Goal: Maintains adequate nutritional intake  Description: INTERVENTIONS:  - Monitor percentage of each meal consumed  - Identify factors contributing to decreased intake, treat as appropriate  - Assist with meals as needed  - Monitor I&O, weight, and lab values if indicated  - Obtain nutrition services referral as needed  Outcome: Progressing  Goal: Establish and maintain optimal ostomy function  Description: INTERVENTIONS:  - Assess bowel function  - Encourage oral fluids to ensure adequate hydration  - Administer IV fluids if ordered to ensure adequate hydration   - Administer ordered medications as needed  - Encourage mobilization and activity  - Nutrition services referral to assist patient with appropriate food choices  - Assess stoma site  - Consider wound care consult   Outcome: Progressing     Problem: GENITOURINARY - ADULT  Goal: Maintains or returns to baseline urinary function  Description: INTERVENTIONS:  - Assess urinary function  - Encourage oral fluids to ensure adequate hydration if ordered  - Administer IV fluids as ordered to ensure adequate hydration  - Administer ordered medications as needed  - Offer frequent toileting  - Follow urinary retention protocol if ordered  Outcome: Progressing  Goal: Absence of urinary retention  Description: INTERVENTIONS:  - Assess patients ability to void and empty bladder  - Monitor I/O  - Bladder scan as needed  - Discuss with physician/AP medications to alleviate retention as needed  - Discuss catheterization for long term situations as appropriate  Outcome: Progressing  Goal: Urinary catheter remains patent  Description: INTERVENTIONS:  - Assess patency of urinary catheter  - If patient has a chronic torres, consider changing catheter if non-functioning  - Follow guidelines for intermittent irrigation of non-functioning urinary catheter  Outcome: Progressing     Problem: METABOLIC, FLUID AND ELECTROLYTES - ADULT  Goal: Electrolytes maintained within normal limits  Description: INTERVENTIONS:  - Monitor labs and assess patient for signs and symptoms of electrolyte imbalances  - Administer electrolyte replacement as ordered  - Monitor response to electrolyte replacements, including repeat lab results as appropriate  - Instruct patient on fluid and nutrition as appropriate  Outcome: Progressing  Goal: Fluid balance maintained  Description: INTERVENTIONS:  - Monitor labs   - Monitor I/O and WT  - Instruct patient on fluid and nutrition as appropriate  - Assess for signs & symptoms of volume excess or deficit  Outcome: Progressing  Goal: Glucose maintained within target range  Description: INTERVENTIONS:  - Monitor Blood Glucose as ordered  - Assess for signs and symptoms of hyperglycemia and hypoglycemia  - Administer ordered medications to maintain glucose within target range  - Assess nutritional intake and initiate nutrition service referral as needed  Outcome: Progressing     Problem: SKIN/TISSUE INTEGRITY - ADULT  Goal: Skin integrity remains intact  Description: INTERVENTIONS  - Identify patients at risk for skin breakdown  - Assess and monitor skin integrity  - Assess and monitor nutrition and hydration status  - Monitor labs (i e  albumin)  - Assess for incontinence   - Turn and reposition patient  - Assist with mobility/ambulation  - Relieve pressure over bony prominences  - Avoid friction and shearing  - Provide appropriate hygiene as needed including keeping skin clean and dry  - Evaluate need for skin moisturizer/barrier cream  - Collaborate with interdisciplinary team (i e  Nutrition, Rehabilitation, etc )   - Patient/family teaching  Outcome: Progressing  Goal: Incision(s), wounds(s) or drain site(s) healing without S/S of infection  Description: INTERVENTIONS  - Assess and document risk factors for skin impairment   - Assess and document dressing, incision, wound bed, drain sites and surrounding tissue  - Consider nutrition services referral as needed  - Oral mucous membranes remain intact  - Provide patient/ family education  Outcome: Progressing  Goal: Oral mucous membranes remain intact  Description: INTERVENTIONS  - Assess oral mucosa and hygiene practices  - Implement preventative oral hygiene regimen  - Implement oral medicated treatments as ordered  - Initiate Nutrition services referral as needed  Outcome: Progressing     Problem: HEMATOLOGIC - ADULT  Goal: Maintains hematologic stability  Description: INTERVENTIONS  - Assess for signs and symptoms of bleeding or hemorrhage  - Monitor labs  - Administer supportive blood products/factors as ordered and appropriate  Outcome: Progressing     Problem: MUSCULOSKELETAL - ADULT  Goal: Maintain or return mobility to safest level of function  Description: INTERVENTIONS:  - Assess patient's ability to carry out ADLs; assess patient's baseline for ADL function and identify physical deficits which impact ability to perform ADLs (bathing, care of mouth/teeth, toileting, grooming, dressing, etc )  - Assess/evaluate cause of self-care deficits   - Assess range of motion  - Assess patient's mobility  - Assess patient's need for assistive devices and provide as appropriate  - Encourage maximum independence but intervene and supervise when necessary  - Involve family in performance of ADLs  - Assess for home care needs following discharge   - Consider OT consult to assist with ADL evaluation and planning for discharge  - Provide patient education as appropriate  Outcome: Progressing  Goal: Maintain proper alignment of affected body part  Description: INTERVENTIONS:  - Support, maintain and protect limb and body alignment  - Provide patient/ family with appropriate education  Outcome: Progressing

## 2021-04-27 NOTE — DISCHARGE SUMMARY
[address]  Discharge- [facility] 1970, 52 y o  female MRN: [id number]  Unit/Bed#: -01 Encounter: [id number]  Primary Care Provider: Chang Dove MD   Date and time admitted to hospital: 4/26/2021 10:39 PM    * Precordial chest pain  Assessment & Plan  ACS ruled out  Troponin x3 negative, no significant EKG change, previous echocardiogram was normal   Telemetry monitoring remained uneventful  Patient's symptoms improved  Patient be discharged home      Hepatic steatosis  Assessment & Plan  Low-fat, low-salt diet  Lifestyle modification with exercise  Smoking cessation     RUQ abdominal pain  Assessment & Plan  Ultrasound of abdomen remain normal except hepatic steatosis  Patient is to modify lifestyle modification      Elevated TSH  Assessment & Plan  Mildly elevated TSH (8 5) at time of presentation  No prior lab results available; unclear chronicity  Patient does not appear to have any associated symptoms  Possible mild hypothyroidism vs acute reaction  Recommend retest no earlier than 4 weeks from now  Patient also needs to follow-up with PCP for further management      Dyslipidemia associated with type 2 diabetes mellitus [facility])  Assessment & Plan  Lab Results   Component Value Date    HGBA1C 7 7 (H) 02/03/2021       No results for input(s): POCGLU in the last 72 hours  Blood Sugar Average: Last 72 hrs:     Recent diagnosis with low HDL and high HDL in presence of type 2 diabetes  Patient declined treatment with medications during recent primary care appointment, preferring to treat with lifestyle modification for now  Provide appropriate counseling and encourage close outpatient follow-up  Noncompliance with treatment plan  Assessment & Plan  Patient has multiple chronic medical conditions which are likely to be exacerbated by noncompliance with medical recommendations    She also has a recent history of signing out from hospital AMA and declining evidence-based treatment for medical issues  Discussed importance of adherence to medical guidance in order to optimize health  Morbid obesity with BMI of 40 0-44 9, adult ([facility])  Assessment & Plan  BMI 41 4  Consider consult to dietitian  Encourage lifestyle modifications  Hypertensive urgency  Assessment & Plan  Patient presented to ER with /121  Improved with labetalol 10mg IV (2 doses total given in ER)  Patient has little history in electronic record, but prior BP also elevated to roughly 160s/90s  She has previously declined medications as outpatient, preferring to treat with lifestyle modifications for now  Will continue labetalol PRN overnight  Recommend readdress importance of treatment for hypertension and encourage to start daily medication  Tobacco abuse  Assessment & Plan  Chronic condition  Encouraged cessation; patient declines assistance at this time  Offer nicotine patch during admission  Type 2 diabetes mellitus with hyperglycemia, without long-term current use of insulin ([facility])  Assessment & Plan  Lab Results   Component Value Date    HGBA1C 7 7 (H) 02/03/2021       No results for input(s): POCGLU in the last 72 hours  Blood Sugar Average: Last 72 hrs:     Diabetes recently diagnosed  Hemoglobin A1c above goal (less than 7)  Patient previously declined treatment with medications during primary care appointment, preferring to treat with lifestyle modification for now  Initiate insulin protocol with blood glucose AC and HS  Provide diabetes education and encourage close outpatient follow-up            Discharging Physician / Practitioner: Sekou Bernardo MD  PCP: Luiz Gross MD  Admission Date:   Admission Orders (From admission, onward)     Ordered        04/27/21 0020  Place in Observation  Once                   Discharge Date: 04/27/21    Resolved Problems  Date Reviewed: 4/27/2021    None          Consultations During [place] Stay:  · none    Procedures Performed:   US right upper quadrant   Final Result by David Broussard MD (04/27 1347)      Hepatic steatosis  No acute abnormality identified  Workstation performed: [id number]         XR chest 1 view   Final Result by Gladis Ridley MD (04/27 8326)      No acute cardiopulmonary disease  Workstation performed: [id number]         CT head without contrast   Final Result by Ortiz Kimble DO (04/26 9317)      No acute intracranial abnormality  Workstation performed: [id number]         ·   ·     Significant Findings / Test Results:   Lab Results   Component Value Date    WBC 8 02 04/27/2021    HGB 15 4 04/27/2021    HCT 45 3 04/27/2021    MCV 92 04/27/2021     04/27/2021   ·   Lab Results   Component Value Date    SODIUM 138 04/27/2021    K 4 0 04/27/2021     04/27/2021    CO2 24 04/27/2021    AGAP 9 04/27/2021    BUN 11 04/27/2021    CREATININE 0 71 04/27/2021    GLUC 161 (H) 04/27/2021    GLUF 221 (H) 02/04/2021    CALCIUM 8 8 04/27/2021    AST 7 04/27/2021    ALT 15 04/27/2021    ALKPHOS 95 04/27/2021    TP 7 2 04/27/2021    TBILI 0 41 04/27/2021    EGFR 101 04/27/2021   ·   ·     Incidental Findings:   · As mentioned above     Test Results Pending at Discharge (will require follow up):   · none     Outpatient Tests Requested:  · none    Complications:  None    Reason for Admission:  Chest pain    Hospital Course:     Rigoberto Roman is a 52 y o  female patient who originally presented to the hospital on 4/26/2021 due to chest pain, needs to rule out ACS  ACS rule out  EKG normal, troponin normal, previous echocardiogram was normal   Telemetry remained uneventful  Patient has done ultrasound of upper abdomen, which remained normal except hepatic steatosis  Patient's symptoms improved  Patient can resume all her home medication  Patient be discharged home with follow-up with PCP as soon as possible        Please see above list of diagnoses and related plan for additional information  Condition at Discharge: stable     Discharge Day Visit / Exam:     Subjective:  Seen and evaluated during the round  Denies any significant complaint  Vitals: Blood Pressure: 146/81 (04/27/21 1110)  Pulse: 77 (04/27/21 1110)  Temperature: 98 2 °F (36 8 °C) (04/27/21 1110)  Temp Source: Temporal (04/26/21 2244)  Respirations: 18 (04/27/21 1110)  Height: 5' 3" (160 cm) (04/26/21 2244)  Weight - Scale: 105 kg (230 lb 6 4 oz) (04/27/21 0600)  SpO2: 93 % (04/27/21 1110)  Exam:   Physical Exam  Vitals signs and nursing note reviewed  Constitutional:       Appearance: She is obese  HENT:      Nose: No congestion  Mouth/Throat:      Mouth: Mucous membranes are moist    Eyes:      General: No scleral icterus  Conjunctiva/sclera: Conjunctivae normal       Pupils: Pupils are equal, round, and reactive to light  Neck:      Musculoskeletal: Normal range of motion  Cardiovascular:      Rate and Rhythm: Normal rate  Heart sounds: No murmur  No friction rub  No gallop  Pulmonary:      Effort: Pulmonary effort is normal  No respiratory distress  Breath sounds: No stridor  No wheezing or rhonchi  Abdominal:      General: Abdomen is flat  Bowel sounds are normal  There is no distension  Palpations: There is no mass  Tenderness: There is no abdominal tenderness  Hernia: No hernia is present  Musculoskeletal: Normal range of motion  General: No swelling or tenderness  Lymphadenopathy:      Cervical: No cervical adenopathy  Skin:     General: Skin is warm  Capillary Refill: Capillary refill takes less than 2 seconds  Coloration: Skin is not jaundiced or pale  Findings: No bruising or erythema  Neurological:      General: No focal deficit present  Mental Status: She is alert and oriented to person, place, and time  Cranial Nerves: No cranial nerve deficit        Sensory: No sensory deficit  Motor: No weakness  Coordination: Coordination normal    Psychiatric:         Mood and Affect: Mood normal        Discussion with Family: with patient    Discharge instructions/Information to patient and family:   See after visit summary for information provided to patient and family  Provisions for Follow-Up Care:  See after visit summary for information related to follow-up care and any pertinent home health orders  Disposition:     Home    For Discharges to [facility] SNF:   · Not Applicable to this Patient - Not Applicable to this Patient    Planned Readmission:  If condition get worse     Discharge Statement:  I spent >35 minutes discharging the patient  This time was spent on the day of discharge  I had direct contact with the patient on the day of discharge  Greater than 50% of the total time was spent examining patient, answering all patient questions, arranging and discussing plan of care with patient as well as directly providing post-discharge instructions  Additional time then spent on discharge activities  Discharge Medications:  See after visit summary for reconciled discharge medications provided to patient and family        ** Please Note: This note has been constructed using a voice recognition system **

## 2021-04-27 NOTE — UTILIZATION REVIEW
Initial Clinical Review    Admission: Date/Time/Statement:   Admission Orders (From admission, onward)     Ordered        04/27/21 0020  Place in Observation  Once                   Orders Placed This Encounter   Procedures    Place in Observation     Standing Status:   Standing     Number of Occurrences:   1     Order Specific Question:   Level of Care     Answer:   Med Surg [16]     ED Arrival Information     Expected Arrival Acuity Means of Arrival Escorted By Service Admission Type    - 4/26/2021 22:39 Emergent Walk-In Self Hospitalist Emergency    Arrival Complaint    Chest pain        Chief Complaint   Patient presents with    Chest Pain     Patient reports CP beginning around 1800  States the pain is now worse and it is radiating up "into her neck" and right arm  Initial Presentation:      52year old female presents to ed from home for evaluation and treatment of chest pain  PMHX:  DM, HEMATURIA , PYELONEPHTRITIS  Clinical assessment significant for RIQ pain and hypertension  Treated in ed with aspirin , labetalol, nitrostat, iv fluids  Admit to observation for chest pain and rule out biliary colic    Plan includes: telemetry, trend troponin, URQ US      ED Triage Vitals [04/26/21 2244]   (!) 97 2 °F (36 2 °C) (!) 125 20 (!) 228/121 95 %      Temporal Monitor         Pain Score       8          04/27/21 105 kg (230 lb 6 4 oz)     Additional Vital Signs:     Date/Time  Temp  Pulse  Resp  BP  MAP (mmHg)  SpO2  O2 Device    04/27/21 07:22:01  97 9 °F (36 6 °C)  88  18  146/78  101  92 %  --    04/27/21 0239  --  --  --  --  --  --  None (Room air)    04/27/21 00:44:17  97 8 °F (36 6 °C)  95  22  152/77  102  96 %  None (Room air)    04/27/21 0000  --  93  17  159/84  116  96 %  --    04/26/21 2345  --  95  17  169/90  120  93 %  --    04/26/21 2330  --  94  19  187/91  Abnormal   130  94 %  None (Room air)    04/26/21 2315  --  97  17  205/98  Abnormal   140  94 %  --    04/26/21 2304  --  102 19  205/98  Abnormal   --  95 %  None (Room air)              Pertinent Labs/Diagnostic Test Results:     XR chest 1 view   (04/27 0907)      No acute cardiopulmonary disease  CT head without contrast    (04/26 2337)      No acute intracranial abnormality        US right upper quadrant          Results from last 7 days   Lab Units 04/27/21  0558 04/26/21  2257   WBC Thousand/uL 8 02 10 41*   HEMOGLOBIN g/dL 15 4 17 1*   HEMATOCRIT % 45 3 50 0*   PLATELETS Thousands/uL 212 228   NEUTROS ABS Thousands/µL 4 78 5 83         Results from last 7 days   Lab Units 04/27/21  0558 04/26/21  2257   SODIUM mmol/L 138 138   POTASSIUM mmol/L 4 0 3 6   CHLORIDE mmol/L 105 101   CO2 mmol/L 24 30   ANION GAP mmol/L 9 7   BUN mg/dL 11 12   CREATININE mg/dL 0 71 0 79   EGFR ml/min/1 73sq m 101 89   CALCIUM mg/dL 8 8 9 4   MAGNESIUM mg/dL 2 0 2 1   PHOSPHORUS mg/dL 3 6  --      Results from last 7 days   Lab Units 04/27/21  0558 04/26/21  2257   AST U/L 7 11   ALT U/L 15 20   ALK PHOS U/L 95 104   TOTAL PROTEIN g/dL 7 2 8 2   ALBUMIN g/dL 3 3* 3 8   TOTAL BILIRUBIN mg/dL 0 41 0 38     Results from last 7 days   Lab Units 04/27/21  0722   POC GLUCOSE mg/dl 152*     Results from last 7 days   Lab Units 04/27/21  0558 04/26/21  2257   GLUCOSE RANDOM mg/dL 161* 195*       Results from last 7 days   Lab Units 04/26/21  2257   CK TOTAL U/L 120     Results from last 7 days   Lab Units 04/27/21  0558 04/27/21  0148 04/26/21  2257   TROPONIN I ng/mL <0 02 <0 02 <0 02     Results from last 7 days   Lab Units 04/26/21  2257   D-DIMER QUANTITATIVE ug/ml FEU 0 38     Results from last 7 days   Lab Units 04/27/21  0558 04/26/21  2257   PROTIME seconds 12 2 12 5   INR  0 92 0 95     Results from last 7 days   Lab Units 04/26/21  2257   TSH 3RD GENERATON uIU/mL 8 325*       Results from last 7 days   Lab Units 04/27/21  0558 04/26/21  2257   LIPASE u/L 61* 140       Results from last 7 days   Lab Units 04/26/21  2303   CLARITY UA  Clear   COLOR UA Yellow   SPEC GRAV UA  1 010   PH UA  7 0   GLUCOSE UA mg/dl 100 (1/10%)*   KETONES UA mg/dl Negative   BLOOD UA  Small*   PROTEIN UA mg/dl Negative   NITRITE UA  Negative   BILIRUBIN UA  Negative   UROBILINOGEN UA E U /dl 0 2   LEUKOCYTES UA  Negative   WBC UA /hpf None Seen   RBC UA /hpf 0-1   BACTERIA UA /hpf Occasional   EPITHELIAL CELLS WET PREP /hpf Occasional             Results from last 7 days   Lab Units 04/26/21  2303   AMPH/METH  Negative   BARBITURATE UR  Negative   BENZODIAZEPINE UR  Negative   COCAINE UR  Negative   METHADONE URINE  Negative   OPIATE UR  Negative   PCP UR  Negative   THC UR  Negative     ED Treatment:   Medication Administration from 04/26/2021 2239 to 04/27/2021 0039       Date/Time Order Dose Route Action     04/26/2021 2302 aspirin chewable tablet 324 mg 324 mg Oral Given     04/26/2021 2301 sodium chloride 0 9 % bolus 1,000 mL 1,000 mL Intravenous New Bag     04/26/2021 2304 Labetalol HCl (NORMODYNE) injection 10 mg 10 mg Intravenous Given     04/26/2021 2334 nitroglycerin (NITROSTAT) SL tablet 0 4 mg 0 4 mg Sublingual Given     04/27/2021 0002 Labetalol HCl (NORMODYNE) injection 10 mg 10 mg Intravenous Given        Past Medical History:   Diagnosis Date    Dyslipidemia associated with type 2 diabetes mellitus ([facility] [address] ) 4/27/2021    Elevated TSH 4/27/2021    High cholesterol     Hypertensive urgency 4/27/2021    Morbid obesity with BMI of 40 0-44 9, adult ([facility] [address] ) 4/27/2021    Noncompliance with treatment plan 4/27/2021    Precordial chest pain 4/27/2021    RUQ abdominal pain 4/27/2021    Type 2 diabetes mellitus with hyperglycemia, without long-term current use of insulin ([facility] [address] ) 2/3/2021     Present on Admission:   Precordial chest pain   Hypertensive urgency   Type 2 diabetes mellitus with hyperglycemia, without long-term current use of insulin (HCC)   Tobacco abuse   Dyslipidemia associated with type 2 diabetes mellitus (HCC)   Elevated TSH   RUQ abdominal pain      Admitting Diagnosis: Chest pain [R07 9]  Hypertension, unspecified type [I10]  Abnormal serum thyroid stimulating hormone (TSH) level [R79 89]  Age/Sex: 52 y o  female  Admission Orders:  Scheduled Medications:  heparin (porcine), 5,000 Units, Subcutaneous, Q8H KARRIE  insulin lispro, 1-6 Units, Subcutaneous, TID AC  insulin lispro, 1-6 Units, Subcutaneous, HS      Continuous IV Infusions:     PRN Meds:  acetaminophen, 650 mg, Oral, Q6H PRN  aluminum-magnesium hydroxide-simethicone, 30 mL, Oral, Q4H PRN  docusate sodium, 100 mg, Oral, BID PRN  Labetalol HCl, 10 mg, Intravenous, Q6H PRN  ondansetron, 4 mg, Intravenous, Q4H PRN  sodium chloride (PF), 3 mL, Intravenous, Q1H PRN        IP CONSULT TO CASE MANAGEMENT    Network Utilization Review Department  ATTENTION: Please call with any questions or concerns to [phone] and carefully listen to the prompts so that you are directed to the right person  All voicemails are confidential   Farrel Bodily all requests for admission clinical reviews, approved or denied determinations and any other requests to dedicated fax number below belonging to the campus where the patient is receiving treatment   List of dedicated fax numbers for the Facilities:  [address] DENIALS (Administrative/Medical Necessity) [phone]   [address] (Maternity/NICU/Pediatrics) [phone] [address] [phone] [address] [place] [address] [id number] [place] [address] [place] [place] [phone] [facility] [place] [phone]

## 2021-04-27 NOTE — PHYSICAL THERAPY NOTE
Physical Therapy Screen    Patient Name: Shamar ADDISON Date: 2021     Problem List  Principal Problem:    Precordial chest pain  Active Problems:    Type 2 diabetes mellitus with hyperglycemia, without long-term current use of insulin (HCC)    Tobacco abuse    Hypertensive urgency    Morbid obesity with BMI of 40 0-44 9, adult ([facility] [address] )    Noncompliance with treatment plan    Dyslipidemia associated with type 2 diabetes mellitus (HCC)    Elevated TSH    RUQ abdominal pain       Past Medical History  Past Medical History:   Diagnosis Date    Dyslipidemia associated with type 2 diabetes mellitus ([facility] [address] ) 2021    Elevated TSH 2021    High cholesterol     Hypertensive urgency 2021    Morbid obesity with BMI of 40 0-44 9, adult ([facility] [address] ) 2021    Noncompliance with treatment plan 2021    Precordial chest pain 2021    RUQ abdominal pain 2021    Type 2 diabetes mellitus with hyperglycemia, without long-term current use of insulin ([facility] [address] ) 2/3/2021        Past Surgical History  Past Surgical History:   Procedure Laterality Date    APPENDECTOMY       SECTION      DILATION AND CURETTAGE OF UTERUS          21 0910   PT Last Visit   PT Visit Date 21   Note Type   Note type Screen        Received order for PT consult  Chart reviewed  Pt admitted with diagnosis precordial chest pain  Per nursing, RN, Addis Hare and PCA, patient is independent in room  Spoke with patient  Pt reports being independent PTA  Reports being at her PLOF at this time  No difficulty with ambulation in room or ADLs  Reports no concerns with returning home at time of discharge  Should pt's status change, please re-consult       Velia Conrad, PT,DPT

## 2021-04-27 NOTE — PLAN OF CARE
Problem: PAIN - ADULT  Goal: Verbalizes/displays adequate comfort level or baseline comfort level  Description: Interventions:  - Encourage patient to monitor pain and request assistance  - Assess pain using appropriate pain scale  - Administer analgesics based on type and severity of pain and evaluate response  - Implement non-pharmacological measures as appropriate and evaluate response  - Consider cultural and social influences on pain and pain management  - Notify physician/advanced practitioner if interventions unsuccessful or patient reports new pain  4/27/2021 1434 by Saira Saab RN  Outcome: Adequate for Discharge  4/27/2021 1120 by Saira Saab RN  Outcome: Progressing     Problem: INFECTION - ADULT  Goal: Absence or prevention of progression during hospitalization  Description: INTERVENTIONS:  - Assess and monitor for signs and symptoms of infection  - Monitor lab/diagnostic results  - Monitor all insertion sites, i e  indwelling lines, tubes, and drains  - Monitor endotracheal if appropriate and nasal secretions for changes in amount and color  - [place] appropriate cooling/warming therapies per order  - Administer medications as ordered  - Instruct and encourage patient and family to use good hand hygiene technique  - Identify and instruct in appropriate isolation precautions for identified infection/condition  4/27/2021 1434 by Saira Saab RN  Outcome: Adequate for Discharge  4/27/2021 1120 by Saira Saab RN  Outcome: Progressing  Goal: Absence of fever/infection during neutropenic period  Description: INTERVENTIONS:  - Monitor WBC    Outcome: Adequate for Discharge     Problem: SAFETY ADULT  Goal: Patient will remain free of falls  Description: INTERVENTIONS:  - Assess patient frequently for physical needs  -  Identify cognitive and physical deficits and behaviors that affect risk of falls    -  [place] fall precautions as indicated by assessment   - Educate patient/family on patient safety including physical limitations  - Instruct patient to call for assistance with activity based on assessment  - Modify environment to reduce risk of injury  - Consider OT/PT consult to assist with strengthening/mobility  4/27/2021 1434 by Lon Najera RN  Outcome: Adequate for Discharge  4/27/2021 1120 by Lon Najera RN  Outcome: Progressing  Goal: Maintain or return to baseline ADL function  Description: INTERVENTIONS:  -  Assess patient's ability to carry out ADLs; assess patient's baseline for ADL function and identify physical deficits which impact ability to perform ADLs (bathing, care of mouth/teeth, toileting, grooming, dressing, etc )  - Assess/evaluate cause of self-care deficits   - Assess range of motion  - Assess patient's mobility; develop plan if impaired  - Assess patient's need for assistive devices and provide as appropriate  - Encourage maximum independence but intervene and supervise when necessary  - Involve family in performance of ADLs  - Assess for home care needs following discharge   - Consider OT consult to assist with ADL evaluation and planning for discharge  - Provide patient education as appropriate  4/27/2021 1434 by Lon Najera RN  Outcome: Adequate for Discharge  4/27/2021 1120 by Lon Najera RN  Outcome: Progressing  Goal: Maintain or return mobility status to optimal level  Description: INTERVENTIONS:  - Assess patient's baseline mobility status (ambulation, transfers, stairs, etc )    - Identify cognitive and physical deficits and behaviors that affect mobility  - Identify mobility aids required to assist with transfers and/or ambulation (gait belt, sit-to-stand, lift, walker, cane, etc )  - [place] fall precautions as indicated by assessment  - Record patient progress and toleration of activity level on Mobility SBAR; progress patient to next Phase/Stage  - Instruct patient to call for assistance with activity based on assessment  - Consider rehabilitation consult to assist with strengthening/weightbearing, etc   4/27/2021 1434 by Sherlyn Hidalgo RN  Outcome: Adequate for Discharge  4/27/2021 1120 by Sherlyn Hidalgo RN  Outcome: Progressing     Problem: DISCHARGE PLANNING  Goal: Discharge to home or other facility with appropriate resources  Description: INTERVENTIONS:  - Identify barriers to discharge w/patient and caregiver  - Arrange for needed discharge resources and transportation as appropriate  - Identify discharge learning needs (meds, wound care, etc )  - Arrange for interpretive services to assist at discharge as needed  - Refer to Case Management Department for coordinating discharge planning if the patient needs post-hospital services based on physician/advanced practitioner order or complex needs related to functional status, cognitive ability, or social support system  4/27/2021 1434 by Sherlyn Hidalgo RN  Outcome: Adequate for Discharge  4/27/2021 1120 by Sherlyn Hidalgo RN  Outcome: Progressing     Problem: Knowledge Deficit  Goal: Patient/family/caregiver demonstrates understanding of disease process, treatment plan, medications, and discharge instructions  Description: Complete learning assessment and assess knowledge base    Interventions:  - Provide teaching at level of understanding  - Provide teaching via preferred learning methods  4/27/2021 1434 by Sherlyn Hidalgo RN  Outcome: Adequate for Discharge  4/27/2021 1120 by Sherlyn Hidalgo RN  Outcome: Progressing     Problem: NEUROSENSORY - ADULT  Goal: Achieves stable or improved neurological status  Description: INTERVENTIONS  - Monitor and report changes in neurological status  - Monitor vital signs such as temperature, blood pressure, glucose, and any other labs ordered   - Initiate measures to prevent increased intracranial pressure  - Monitor for seizure activity and implement precautions if appropriate      4/27/2021 1434 by Sherlyn Hidalgo RN  Outcome: Adequate for Discharge  4/27/2021 1120 by Sherlyn Hidalgo RN  Outcome: Progressing  Goal: Remains free of injury related to seizures activity  Description: INTERVENTIONS  - Maintain airway, patient safety  and administer oxygen as ordered  - Monitor patient for seizure activity, document and report duration and description of seizure to physician/advanced practitioner  - If seizure occurs,  ensure patient safety during seizure  - Reorient patient post seizure  - Seizure pads on all 4 side rails  - Instruct patient/family to notify RN of any seizure activity including if an aura is experienced  - Instruct patient/family to call for assistance with activity based on nursing assessment  - Administer anti-seizure medications if ordered    4/27/2021 1434 by Jeannette Oh RN  Outcome: Adequate for Discharge  4/27/2021 1120 by Jeannette Oh RN  Outcome: Progressing  Goal: Achieves maximal functionality and self care  Description: INTERVENTIONS  - Monitor swallowing and airway patency with patient fatigue and changes in neurological status  - Encourage and assist patient to increase activity and self care     - Encourage visually impaired, hearing impaired and aphasic patients to use assistive/communication devices  4/27/2021 1434 by Jeannette Oh RN  Outcome: Adequate for Discharge  4/27/2021 1120 by Jeannette Oh RN  Outcome: Progressing     Problem: CARDIOVASCULAR - ADULT  Goal: Maintains optimal cardiac output and hemodynamic stability  Description: INTERVENTIONS:  - Monitor I/O, vital signs and rhythm  - Monitor for S/S and trends of decreased cardiac output  - Administer and titrate ordered vasoactive medications to optimize hemodynamic stability  - Assess quality of pulses, skin color and temperature  - Assess for signs of decreased coronary artery perfusion  - Instruct patient to report change in severity of symptoms  4/27/2021 1434 by Jeannette Oh RN  Outcome: Adequate for Discharge  4/27/2021 1120 by Jeannette Oh RN  Outcome: Progressing  Goal: Absence of cardiac dysrhythmias or at baseline rhythm  Description: INTERVENTIONS:  - Continuous cardiac monitoring, vital signs, obtain 12 lead EKG if ordered  - Administer antiarrhythmic and heart rate control medications as ordered  - Monitor electrolytes and administer replacement therapy as ordered  4/27/2021 1434 by Omar Pena RN  Outcome: Adequate for Discharge  4/27/2021 1120 by Omar Pena RN  Outcome: Progressing     Problem: RESPIRATORY - ADULT  Goal: Achieves optimal ventilation and oxygenation  Description: INTERVENTIONS:  - Assess for changes in respiratory status  - Assess for changes in mentation and behavior  - Position to facilitate oxygenation and minimize respiratory effort  - Oxygen administered by appropriate delivery if ordered  - Initiate smoking cessation education as indicated  - Encourage broncho-pulmonary hygiene including cough, deep breathe, Incentive Spirometry  - Assess the need for suctioning and aspirate as needed  - Assess and instruct to report SOB or any respiratory difficulty  - Respiratory Therapy support as indicated  4/27/2021 1434 by Omar Pena RN  Outcome: Adequate for Discharge  4/27/2021 1120 by Omar Pena RN  Outcome: Progressing     Problem: GASTROINTESTINAL - ADULT  Goal: Minimal or absence of nausea and/or vomiting  Description: INTERVENTIONS:  - Administer IV fluids if ordered to ensure adequate hydration  - Maintain NPO status until nausea and vomiting are resolved  - Nasogastric tube if ordered  - Administer ordered antiemetic medications as needed  - Provide nonpharmacologic comfort measures as appropriate  - Advance diet as tolerated, if ordered  - Consider nutrition services referral to assist patient with adequate nutrition and appropriate food choices  4/27/2021 1434 by Omar Pena RN  Outcome: Adequate for Discharge  4/27/2021 1120 by Omar Pena RN  Outcome: Progressing  Goal: Maintains or returns to baseline bowel function  Description: INTERVENTIONS:  - Assess bowel function  - Encourage oral fluids to ensure adequate hydration  - Administer IV fluids if ordered to ensure adequate hydration  - Administer ordered medications as needed  - Encourage mobilization and activity  - Consider nutritional services referral to assist patient with adequate nutrition and appropriate food choices  4/27/2021 1434 by Balta Resendiz RN  Outcome: Adequate for Discharge  4/27/2021 1120 by Balta Resendiz RN  Outcome: Progressing  Goal: Maintains adequate nutritional intake  Description: INTERVENTIONS:  - Monitor percentage of each meal consumed  - Identify factors contributing to decreased intake, treat as appropriate  - Assist with meals as needed  - Monitor I&O, weight, and lab values if indicated  - Obtain nutrition services referral as needed  4/27/2021 1434 by Balta Resendiz RN  Outcome: Adequate for Discharge  4/27/2021 1120 by Balta Resendiz RN  Outcome: Progressing  Goal: Establish and maintain optimal ostomy function  Description: INTERVENTIONS:  - Assess bowel function  - Encourage oral fluids to ensure adequate hydration  - Administer IV fluids if ordered to ensure adequate hydration   - Administer ordered medications as needed  - Encourage mobilization and activity  - Nutrition services referral to assist patient with appropriate food choices  - Assess stoma site  - Consider wound care consult   4/27/2021 1434 by Balta Resendiz RN  Outcome: Adequate for Discharge  4/27/2021 1120 by Balta Resendiz RN  Outcome: Progressing     Problem: GENITOURINARY - ADULT  Goal: Maintains or returns to baseline urinary function  Description: INTERVENTIONS:  - Assess urinary function  - Encourage oral fluids to ensure adequate hydration if ordered  - Administer IV fluids as ordered to ensure adequate hydration  - Administer ordered medications as needed  - Offer frequent toileting  - Follow urinary retention protocol if ordered  4/27/2021 1434 by Balta Resendiz RN  Outcome: Adequate for Discharge  4/27/2021 1120 by Balta Resendiz RN  Outcome: Progressing  Goal: Absence of urinary retention  Description: INTERVENTIONS:  - Assess patients ability to void and empty bladder  - Monitor I/O  - Bladder scan as needed  - Discuss with physician/AP medications to alleviate retention as needed  - Discuss catheterization for long term situations as appropriate  4/27/2021 1434 by Yazan Forrest RN  Outcome: Adequate for Discharge  4/27/2021 1120 by Yazan Forrest RN  Outcome: Progressing  Goal: Urinary catheter remains patent  Description: INTERVENTIONS:  - Assess patency of urinary catheter  - If patient has a chronic torres, consider changing catheter if non-functioning  - Follow guidelines for intermittent irrigation of non-functioning urinary catheter  4/27/2021 1434 by Yazan Forrest RN  Outcome: Adequate for Discharge  4/27/2021 1120 by Yazan Forrest RN  Outcome: Progressing     Problem: METABOLIC, FLUID AND ELECTROLYTES - ADULT  Goal: Electrolytes maintained within normal limits  Description: INTERVENTIONS:  - Monitor labs and assess patient for signs and symptoms of electrolyte imbalances  - Administer electrolyte replacement as ordered  - Monitor response to electrolyte replacements, including repeat lab results as appropriate  - Instruct patient on fluid and nutrition as appropriate  4/27/2021 1434 by Yazan Forrest RN  Outcome: Adequate for Discharge  4/27/2021 1120 by Yazan Forrest RN  Outcome: Progressing  Goal: Fluid balance maintained  Description: INTERVENTIONS:  - Monitor labs   - Monitor I/O and WT  - Instruct patient on fluid and nutrition as appropriate  - Assess for signs & symptoms of volume excess or deficit  4/27/2021 1434 by Yazan Forrest RN  Outcome: Adequate for Discharge  4/27/2021 1120 by Yazan Forrest RN  Outcome: Progressing  Goal: Glucose maintained within target range  Description: INTERVENTIONS:  - Monitor Blood Glucose as ordered  - Assess for signs and symptoms of hyperglycemia and hypoglycemia  - Administer ordered medications to maintain glucose within target range  - Assess nutritional intake and initiate nutrition service referral as needed  4/27/2021 1434 by Melissa Hopper RN  Outcome: Adequate for Discharge  4/27/2021 1120 by Melissa Hopper RN  Outcome: Progressing     Problem: SKIN/TISSUE INTEGRITY - ADULT  Goal: Skin integrity remains intact  Description: INTERVENTIONS  - Identify patients at risk for skin breakdown  - Assess and monitor skin integrity  - Assess and monitor nutrition and hydration status  - Monitor labs (i e  albumin)  - Assess for incontinence   - Turn and reposition patient  - Assist with mobility/ambulation  - Relieve pressure over bony prominences  - Avoid friction and shearing  - Provide appropriate hygiene as needed including keeping skin clean and dry  - Evaluate need for skin moisturizer/barrier cream  - Collaborate with interdisciplinary team (i e  Nutrition, Rehabilitation, etc )   - Patient/family teaching  4/27/2021 1434 by Melissa Hopper RN  Outcome: Adequate for Discharge  4/27/2021 1120 by Melissa Hopper RN  Outcome: Progressing  Goal: Incision(s), wounds(s) or drain site(s) healing without S/S of infection  Description: INTERVENTIONS  - Assess and document risk factors for skin impairment   - Assess and document dressing, incision, wound bed, drain sites and surrounding tissue  - Consider nutrition services referral as needed  - Oral mucous membranes remain intact  - Provide patient/ family education  4/27/2021 1434 by Melissa Hopper RN  Outcome: Adequate for Discharge  4/27/2021 1120 by Melissa Hopper RN  Outcome: Progressing  Goal: Oral mucous membranes remain intact  Description: INTERVENTIONS  - Assess oral mucosa and hygiene practices  - Implement preventative oral hygiene regimen  - Implement oral medicated treatments as ordered  - Initiate Nutrition services referral as needed  4/27/2021 1434 by Melissa Hopper RN  Outcome: Adequate for Discharge  4/27/2021 1120 by Melissa Hopper RN  Outcome: Progressing     Problem: HEMATOLOGIC - ADULT  Goal: Maintains hematologic stability  Description: INTERVENTIONS  - Assess for signs and symptoms of bleeding or hemorrhage  - Monitor labs  - Administer supportive blood products/factors as ordered and appropriate  4/27/2021 1434 by Lon Najera RN  Outcome: Adequate for Discharge  4/27/2021 1120 by Lon Najera RN  Outcome: Progressing     Problem: MUSCULOSKELETAL - ADULT  Goal: Maintain or return mobility to safest level of function  Description: INTERVENTIONS:  - Assess patient's ability to carry out ADLs; assess patient's baseline for ADL function and identify physical deficits which impact ability to perform ADLs (bathing, care of mouth/teeth, toileting, grooming, dressing, etc )  - Assess/evaluate cause of self-care deficits   - Assess range of motion  - Assess patient's mobility  - Assess patient's need for assistive devices and provide as appropriate  - Encourage maximum independence but intervene and supervise when necessary  - Involve family in performance of ADLs  - Assess for home care needs following discharge   - Consider OT consult to assist with ADL evaluation and planning for discharge  - Provide patient education as appropriate  4/27/2021 1434 by Lon Najera RN  Outcome: Adequate for Discharge  4/27/2021 1120 by oLn Najera RN  Outcome: Progressing  Goal: Maintain proper alignment of affected body part  Description: INTERVENTIONS:  - Support, maintain and protect limb and body alignment  - Provide patient/ family with appropriate education  4/27/2021 1434 by Lon Najera RN  Outcome: Adequate for Discharge  4/27/2021 1120 by Lon Najera RN  Outcome: Progressing

## 2021-04-27 NOTE — ASSESSMENT & PLAN NOTE
ACS ruled out  Troponin x3 negative, no significant EKG change, previous echocardiogram was normal   Telemetry monitoring remained uneventful  Patient's symptoms improved  Patient be discharged home

## 2021-04-27 NOTE — OCCUPATIONAL THERAPY NOTE
Occupational Therapy Screen     Patient Name: Shamar Dale  UDANIEL Date: 4/27/2021  Problem List  Principal Problem:    Precordial chest pain  Active Problems:    Type 2 diabetes mellitus with hyperglycemia, without long-term current use of insulin (HCC)    Tobacco abuse    Hypertensive urgency    Morbid obesity with BMI of 40 0-44 9, adult ([facility] [address] )    Noncompliance with treatment plan    Dyslipidemia associated with type 2 diabetes mellitus (HCC)    Elevated TSH    RUQ abdominal pain         OT orders received  Chart review completed  Pt admitted to [address] [place] [address] under observation on 4/26/2021 with Dx: precordial chest pain  Spoke with Pt this AM who reports completing ADLs and functional ambulation in room @ I with no difficulty noted  Pt has no concerns for returning home at this time and has support as needed  Pt does not require any further acute OT services at this time  D/c OT effective 4/27/2021  If new concerns arise, please re-consult          04/27/21 6033   Note Type   Note type Screen       Kelvin Chappell, OTR/L

## 2021-04-27 NOTE — ASSESSMENT & PLAN NOTE
Patient presented to ER with /121  Improved with labetalol 10mg IV (2 doses total given in ER)  Patient has little history in electronic record, but prior BP also elevated to roughly 160s/90s  She has previously declined medications as outpatient, preferring to treat with lifestyle modifications for now  Will continue labetalol PRN overnight  Recommend readdress importance of treatment for hypertension and encourage to start daily medication

## 2021-04-27 NOTE — PLAN OF CARE
Problem: PAIN - ADULT  Goal: Verbalizes/displays adequate comfort level or baseline comfort level  Description: Interventions:  - Encourage patient to monitor pain and request assistance  - Assess pain using appropriate pain scale  - Administer analgesics based on type and severity of pain and evaluate response  - Implement non-pharmacological measures as appropriate and evaluate response  - Consider cultural and social influences on pain and pain management  - Notify physician/advanced practitioner if interventions unsuccessful or patient reports new pain  Outcome: Progressing     Problem: INFECTION - ADULT  Goal: Absence or prevention of progression during hospitalization  Description: INTERVENTIONS:  - Assess and monitor for signs and symptoms of infection  - Monitor lab/diagnostic results  - Monitor all insertion sites, i e  indwelling lines, tubes, and drains  - Monitor endotracheal if appropriate and nasal secretions for changes in amount and color  - [place] appropriate cooling/warming therapies per order  - Administer medications as ordered  - Instruct and encourage patient and family to use good hand hygiene technique  - Identify and instruct in appropriate isolation precautions for identified infection/condition  Outcome: Progressing     Problem: SAFETY ADULT  Goal: Patient will remain free of falls  Description: INTERVENTIONS:  - Assess patient frequently for physical needs  -  Identify cognitive and physical deficits and behaviors that affect risk of falls    -  [place] fall precautions as indicated by assessment   - Educate patient/family on patient safety including physical limitations  - Instruct patient to call for assistance with activity based on assessment  - Modify environment to reduce risk of injury  - Consider OT/PT consult to assist with strengthening/mobility  Outcome: Progressing  Goal: Maintain or return to baseline ADL function  Description: INTERVENTIONS:  -  Assess patient's ability to carry out ADLs; assess patient's baseline for ADL function and identify physical deficits which impact ability to perform ADLs (bathing, care of mouth/teeth, toileting, grooming, dressing, etc )  - Assess/evaluate cause of self-care deficits   - Assess range of motion  - Assess patient's mobility; develop plan if impaired  - Assess patient's need for assistive devices and provide as appropriate  - Encourage maximum independence but intervene and supervise when necessary  - Involve family in performance of ADLs  - Assess for home care needs following discharge   - Consider OT consult to assist with ADL evaluation and planning for discharge  - Provide patient education as appropriate  Outcome: Progressing  Goal: Maintain or return mobility status to optimal level  Description: INTERVENTIONS:  - Assess patient's baseline mobility status (ambulation, transfers, stairs, etc )    - Identify cognitive and physical deficits and behaviors that affect mobility  - Identify mobility aids required to assist with transfers and/or ambulation (gait belt, sit-to-stand, lift, walker, cane, etc )  - [place] fall precautions as indicated by assessment  - Record patient progress and toleration of activity level on Mobility SBAR; progress patient to next Phase/Stage  - Instruct patient to call for assistance with activity based on assessment  - Consider rehabilitation consult to assist with strengthening/weightbearing, etc   Outcome: Progressing     Problem: DISCHARGE PLANNING  Goal: Discharge to home or other facility with appropriate resources  Description: INTERVENTIONS:  - Identify barriers to discharge w/patient and caregiver  - Arrange for needed discharge resources and transportation as appropriate  - Identify discharge learning needs (meds, wound care, etc )  - Arrange for interpretive services to assist at discharge as needed  - Refer to Case Management Department for coordinating discharge planning if the patient needs post-hospital services based on physician/advanced practitioner order or complex needs related to functional status, cognitive ability, or social support system  Outcome: Progressing     Problem: Knowledge Deficit  Goal: Patient/family/caregiver demonstrates understanding of disease process, treatment plan, medications, and discharge instructions  Description: Complete learning assessment and assess knowledge base  Interventions:  - Provide teaching at level of understanding  - Provide teaching via preferred learning methods  Outcome: Progressing     Problem: NEUROSENSORY - ADULT  Goal: Achieves stable or improved neurological status  Description: INTERVENTIONS  - Monitor and report changes in neurological status  - Monitor vital signs such as temperature, blood pressure, glucose, and any other labs ordered   - Initiate measures to prevent increased intracranial pressure  - Monitor for seizure activity and implement precautions if appropriate      Outcome: Progressing  Goal: Remains free of injury related to seizures activity  Description: INTERVENTIONS  - Maintain airway, patient safety  and administer oxygen as ordered  - Monitor patient for seizure activity, document and report duration and description of seizure to physician/advanced practitioner  - If seizure occurs,  ensure patient safety during seizure  - Reorient patient post seizure  - Seizure pads on all 4 side rails  - Instruct patient/family to notify RN of any seizure activity including if an aura is experienced  - Instruct patient/family to call for assistance with activity based on nursing assessment  - Administer anti-seizure medications if ordered    Outcome: Progressing  Goal: Achieves maximal functionality and self care  Description: INTERVENTIONS  - Monitor swallowing and airway patency with patient fatigue and changes in neurological status  - Encourage and assist patient to increase activity and self care     - Encourage visually impaired, hearing impaired and aphasic patients to use assistive/communication devices  Outcome: Progressing     Problem: CARDIOVASCULAR - ADULT  Goal: Maintains optimal cardiac output and hemodynamic stability  Description: INTERVENTIONS:  - Monitor I/O, vital signs and rhythm  - Monitor for S/S and trends of decreased cardiac output  - Administer and titrate ordered vasoactive medications to optimize hemodynamic stability  - Assess quality of pulses, skin color and temperature  - Assess for signs of decreased coronary artery perfusion  - Instruct patient to report change in severity of symptoms  Outcome: Progressing  Goal: Absence of cardiac dysrhythmias or at baseline rhythm  Description: INTERVENTIONS:  - Continuous cardiac monitoring, vital signs, obtain 12 lead EKG if ordered  - Administer antiarrhythmic and heart rate control medications as ordered  - Monitor electrolytes and administer replacement therapy as ordered  Outcome: Progressing     Problem: RESPIRATORY - ADULT  Goal: Achieves optimal ventilation and oxygenation  Description: INTERVENTIONS:  - Assess for changes in respiratory status  - Assess for changes in mentation and behavior  - Position to facilitate oxygenation and minimize respiratory effort  - Oxygen administered by appropriate delivery if ordered  - Initiate smoking cessation education as indicated  - Encourage broncho-pulmonary hygiene including cough, deep breathe, Incentive Spirometry  - Assess the need for suctioning and aspirate as needed  - Assess and instruct to report SOB or any respiratory difficulty  - Respiratory Therapy support as indicated  Outcome: Progressing     Problem: GASTROINTESTINAL - ADULT  Goal: Minimal or absence of nausea and/or vomiting  Description: INTERVENTIONS:  - Administer IV fluids if ordered to ensure adequate hydration  - Maintain NPO status until nausea and vomiting are resolved  - Nasogastric tube if ordered  - Administer ordered antiemetic medications as needed  - Provide nonpharmacologic comfort measures as appropriate  - Advance diet as tolerated, if ordered  - Consider nutrition services referral to assist patient with adequate nutrition and appropriate food choices  Outcome: Progressing  Goal: Maintains or returns to baseline bowel function  Description: INTERVENTIONS:  - Assess bowel function  - Encourage oral fluids to ensure adequate hydration  - Administer IV fluids if ordered to ensure adequate hydration  - Administer ordered medications as needed  - Encourage mobilization and activity  - Consider nutritional services referral to assist patient with adequate nutrition and appropriate food choices  Outcome: Progressing  Goal: Maintains adequate nutritional intake  Description: INTERVENTIONS:  - Monitor percentage of each meal consumed  - Identify factors contributing to decreased intake, treat as appropriate  - Assist with meals as needed  - Monitor I&O, weight, and lab values if indicated  - Obtain nutrition services referral as needed  Outcome: Progressing  Goal: Establish and maintain optimal ostomy function  Description: INTERVENTIONS:  - Assess bowel function  - Encourage oral fluids to ensure adequate hydration  - Administer IV fluids if ordered to ensure adequate hydration   - Administer ordered medications as needed  - Encourage mobilization and activity  - Nutrition services referral to assist patient with appropriate food choices  - Assess stoma site  - Consider wound care consult   Outcome: Progressing     Problem: GENITOURINARY - ADULT  Goal: Maintains or returns to baseline urinary function  Description: INTERVENTIONS:  - Assess urinary function  - Encourage oral fluids to ensure adequate hydration if ordered  - Administer IV fluids as ordered to ensure adequate hydration  - Administer ordered medications as needed  - Offer frequent toileting  - Follow urinary retention protocol if ordered  Outcome: Progressing  Goal: Absence of urinary retention  Description: INTERVENTIONS:  - Assess patients ability to void and empty bladder  - Monitor I/O  - Bladder scan as needed  - Discuss with physician/AP medications to alleviate retention as needed  - Discuss catheterization for long term situations as appropriate  Outcome: Progressing  Goal: Urinary catheter remains patent  Description: INTERVENTIONS:  - Assess patency of urinary catheter  - If patient has a chronic torres, consider changing catheter if non-functioning  - Follow guidelines for intermittent irrigation of non-functioning urinary catheter  Outcome: Progressing     Problem: METABOLIC, FLUID AND ELECTROLYTES - ADULT  Goal: Electrolytes maintained within normal limits  Description: INTERVENTIONS:  - Monitor labs and assess patient for signs and symptoms of electrolyte imbalances  - Administer electrolyte replacement as ordered  - Monitor response to electrolyte replacements, including repeat lab results as appropriate  - Instruct patient on fluid and nutrition as appropriate  Outcome: Progressing  Goal: Fluid balance maintained  Description: INTERVENTIONS:  - Monitor labs   - Monitor I/O and WT  - Instruct patient on fluid and nutrition as appropriate  - Assess for signs & symptoms of volume excess or deficit  Outcome: Progressing  Goal: Glucose maintained within target range  Description: INTERVENTIONS:  - Monitor Blood Glucose as ordered  - Assess for signs and symptoms of hyperglycemia and hypoglycemia  - Administer ordered medications to maintain glucose within target range  - Assess nutritional intake and initiate nutrition service referral as needed  Outcome: Progressing     Problem: SKIN/TISSUE INTEGRITY - ADULT  Goal: Skin integrity remains intact  Description: INTERVENTIONS  - Identify patients at risk for skin breakdown  - Assess and monitor skin integrity  - Assess and monitor nutrition and hydration status  - Monitor labs (i e  albumin)  - Assess for incontinence   - Turn and reposition patient  - Assist with mobility/ambulation  - Relieve pressure over bony prominences  - Avoid friction and shearing  - Provide appropriate hygiene as needed including keeping skin clean and dry  - Evaluate need for skin moisturizer/barrier cream  - Collaborate with interdisciplinary team (i e  Nutrition, Rehabilitation, etc )   - Patient/family teaching  Outcome: Progressing  Goal: Incision(s), wounds(s) or drain site(s) healing without S/S of infection  Description: INTERVENTIONS  - Assess and document risk factors for skin impairment   - Assess and document dressing, incision, wound bed, drain sites and surrounding tissue  - Consider nutrition services referral as needed  - Oral mucous membranes remain intact  - Provide patient/ family education  Outcome: Progressing  Goal: Oral mucous membranes remain intact  Description: INTERVENTIONS  - Assess oral mucosa and hygiene practices  - Implement preventative oral hygiene regimen  - Implement oral medicated treatments as ordered  - Initiate Nutrition services referral as needed  Outcome: Progressing     Problem: HEMATOLOGIC - ADULT  Goal: Maintains hematologic stability  Description: INTERVENTIONS  - Assess for signs and symptoms of bleeding or hemorrhage  - Monitor labs  - Administer supportive blood products/factors as ordered and appropriate  Outcome: Progressing     Problem: MUSCULOSKELETAL - ADULT  Goal: Maintain or return mobility to safest level of function  Description: INTERVENTIONS:  - Assess patient's ability to carry out ADLs; assess patient's baseline for ADL function and identify physical deficits which impact ability to perform ADLs (bathing, care of mouth/teeth, toileting, grooming, dressing, etc )  - Assess/evaluate cause of self-care deficits   - Assess range of motion  - Assess patient's mobility  - Assess patient's need for assistive devices and provide as appropriate  - Encourage maximum independence but intervene and supervise when necessary  - Involve family in performance of ADLs  - Assess for home care needs following discharge   - Consider OT consult to assist with ADL evaluation and planning for discharge  - Provide patient education as appropriate  Outcome: Progressing  Goal: Maintain proper alignment of affected body part  Description: INTERVENTIONS:  - Support, maintain and protect limb and body alignment  - Provide patient/ family with appropriate education  Outcome: Progressing

## 2021-04-27 NOTE — ASSESSMENT & PLAN NOTE
Ultrasound of abdomen remain normal except hepatic steatosis  Patient is to modify lifestyle modification

## 2021-04-27 NOTE — PROGRESS NOTES
Chart reviewed  CM consulted to discussed health management:  Pt is here with precordial chest pain, ? Gallbladder issue  CM met with patient at the bedside,baseline information  was obtained  CM discussed the role of CM in helping the patient develop a discharge plan and assist the patient in carry out their plan  CM reviewed obs notification  Pt was agreeable and signed  CM provided copy and placed original in medical records  04/27/21 0837   Referral Data   Obs Notice Signed 04/27/21   Referral Reason   (health management)   Activities of Daily Living Prior to Admission   Functional Status Independent   Assistive Device No device needed   Living Arrangement Lives with someone;House   Ambulation [address] [place] [address] [address]: [address] Self     Patient has identified:  Amy Francisco her spouse as jennifer primary   Amy Francisco is able to assist upon discharge  No POA: No advance directive:  Pt verbalized Amy Francisco would be the person assisting with decisions with making if need be  At this time patient is not interested in receiving information  PCP:   Requesting she wants to change to Dr Balta Anderson from the provider she is seeing [facility]  Patient was in agreement for me to make a PCP appointment for her- only stipulation it had to be on Tuesday-- her only day off as she works at [employer]  Pt has a prescription plan and uses [facility]  Patient is Not a [place]:   Pt denies SA   MH history   _ has some anxiety but she manages on her own    Pt resides with her spouse of 32 years and 21year old son    House set up: 1 story home  Functional level PTA: I/pta  DME use: CPAP unsure of angency  Prior use of Home Care or STR: None  Transportation: +  and will have a ride home   Community Resources: None    CM reviewed d/c planning process including the following: identifying help at home, patient preference for d/c planning needs, availability of treatment team to discuss questions or concerns patient and/or family may have regarding understanding medications and recognizing signs and symptoms once discharged  CM also encouraged patient to follow up with all recommended appointments after discharge  Patient advised of importance for patient and family to participate in managing patients medical well being  Goal of patient upon discharge is to go home    PCP appointment was made for next Tuesday, information on AVS     In discussion of health, pt knows she should take better care of self, lose weight, comply to medical management--

## 2021-04-27 NOTE — ED PROVIDER NOTES
History  Chief Complaint   Patient presents with    Chest Pain     Patient reports CP beginning around 1800  States the pain is now worse and it is radiating up "into her neck" and right arm  28-year-old female with a past medical history of nephrolithiasis, sepsis, pyelonephritis, diabetes, daily tobacco usage, hematuria, hyperlipidemia, obesity, status post appendectomy presents with chest pain that started at 6:00 p m  Today  Patient states she has chest tightness and pressure in her substernal area that is radiating to her right neck, shoulder and right arm  Pain is presently 8/10  Patient states chest pain started at rest   No medications taken prior to ED arrival   Patient denies previous history of myocardial infarction or stents but states that her father passed away from a heart attack at the age of 46  Patient has never seen a cardiologist  Patient denies fever, chills, dizziness, diaphoresis, loss of taste or smell, headache, neck stiffness, sore throat, cough, sputum production, nausea, vomiting, shortness of breath, back pain, rash, abdominal pain, urinary symptoms, vaginal bleeding or discharge, focal motor or sensory deficits, lower extremity swelling or pain, diarrhea, bloody stools or constipation  Review of medical chart shows admission on February 5, 2021 for sepsis secondary to kidney infection   is at bedside  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 due to COVID-19 pandemic including bonnet, eye goggles, face mask and gloves            History provided by:  Patient and medical records   used: No    Chest Pain  Pain location:  Substernal area and R lateral chest  Pain quality: pressure and tightness    Pain radiates to:  R shoulder, R arm and R jaw  Pain radiates to the back: no    Pain severity:  Severe  Onset quality:  Sudden  Duration:  4 hours  Timing:  Constant  Progression:  Unchanged  Chronicity:  New  Context: at rest    Context: not breathing, no drug use, not eating, no intercourse, not lifting, no movement, not raising an arm, no stress and no trauma    Relieved by:  Nothing  Worsened by:  Nothing tried  Ineffective treatments:  None tried  Associated symptoms: no abdominal pain, no AICD problem, no altered mental status, no anorexia, no anxiety, no back pain, no claudication, no cough, no diaphoresis, no dizziness, no dysphagia, no fatigue, no fever, no headache, no heartburn, no lower extremity edema, no nausea, no near-syncope, no numbness, no orthopnea, no palpitations, no PND, no shortness of breath, no syncope, not vomiting and no weakness    Risk factors: diabetes mellitus, high cholesterol, obesity and smoking    Risk factors: no aortic disease, no birth control, no coronary artery disease, no Adore-Danlos syndrome, no hypertension, no immobilization, not male, no Marfan's syndrome, not pregnant, no prior DVT/PE and no surgery        Prior to Admission Medications   Prescriptions Last Dose Informant Patient Reported?  Taking?   ibuprofen (MOTRIN) 600 mg tablet Past Week at Unknown time Self Yes Yes   Sig: Take 600 mg by mouth every 6 (six) hours as needed for mild pain or headaches   ondansetron (ZOFRAN) 4 mg tablet Not Taking at Unknown time  No No   Sig: Take 1 tablet (4 mg total) by mouth every 8 (eight) hours as needed for nausea or vomiting   Patient not taking: Reported on 4/26/2021   oxyCODONE-acetaminophen (PERCOCET) 5-325 mg per tablet Not Taking at Unknown time  No No   Sig: Take 1 tablet by mouth every 4 (four) hours as needed for moderate painMax Daily Amount: 6 tablets   Patient not taking: Reported on 4/26/2021      Facility-Administered Medications: None       Past Medical History:   Diagnosis Date    Dyslipidemia associated with type 2 diabetes mellitus ([facility] ) 4/27/2021    Elevated TSH 4/27/2021    High cholesterol     Hypertensive urgency 4/27/2021    Morbid obesity with BMI of 40 0-44 9, adult ([facility] ) 4/27/2021    Noncompliance with treatment plan 2021    Precordial chest pain 2021    RUQ abdominal pain 2021    Type 2 diabetes mellitus with hyperglycemia, without long-term current use of insulin ([facility] ) 2/3/2021       Past Surgical History:   Procedure Laterality Date    APPENDECTOMY       SECTION      DILATION AND CURETTAGE OF UTERUS         History reviewed  No pertinent family history  I have reviewed and agree with the history as documented  E-Cigarette/Vaping    E-Cigarette Use Never User      E-Cigarette/Vaping Substances     Social History     Tobacco Use    Smoking status: Current Every Day Smoker     Packs/day: 1 00     Types: Cigarettes    Smokeless tobacco: Never Used   Substance Use Topics    Alcohol use: Never     Frequency: Never    Drug use: Never       Review of Systems   Constitutional: Negative for chills, diaphoresis, fatigue and fever  HENT: Negative for congestion, drooling, facial swelling, nosebleeds, sneezing, trouble swallowing and voice change  Eyes: Negative for photophobia, pain and visual disturbance  Respiratory: Negative for cough, chest tightness, shortness of breath and wheezing  Cardiovascular: Positive for chest pain  Negative for palpitations, orthopnea, claudication, leg swelling, syncope, PND and near-syncope  Gastrointestinal: Negative for abdominal pain, anorexia, constipation, diarrhea, heartburn, nausea and vomiting  Genitourinary: Negative for decreased urine volume, difficulty urinating, dysuria, flank pain, frequency, hematuria, urgency, vaginal bleeding and vaginal discharge  Musculoskeletal: Negative for arthralgias, back pain, myalgias, neck pain and neck stiffness  Skin: Negative for color change, pallor, rash and wound  Allergic/Immunologic: Negative for immunocompromised state     Neurological: Negative for dizziness, tremors, seizures, syncope, facial asymmetry, speech difficulty, weakness, light-headedness, numbness and headaches  Hematological: Negative for adenopathy  Psychiatric/Behavioral: Negative for agitation, confusion, hallucinations and suicidal ideas  The patient is not nervous/anxious  Physical Exam  Physical Exam  Vitals signs and nursing note reviewed  Exam conducted with a chaperone present  Constitutional:       General: She is not in acute distress  Appearance: Normal appearance  She is well-developed  She is obese  She is not ill-appearing, toxic-appearing or diaphoretic  Comments: Patient appears uncomfortable, in no acute distress   HENT:      Head: Normocephalic and atraumatic  Jaw: There is normal jaw occlusion  Right Ear: Hearing, tympanic membrane, ear canal and external ear normal  There is no impacted cerumen  No mastoid tenderness  No hemotympanum  Left Ear: Hearing, tympanic membrane, ear canal and external ear normal  There is no impacted cerumen  No mastoid tenderness  No hemotympanum  Nose: Nose normal       Right Nostril: No epistaxis  Left Nostril: No epistaxis  Right Sinus: No maxillary sinus tenderness or frontal sinus tenderness  Left Sinus: No maxillary sinus tenderness or frontal sinus tenderness  Mouth/Throat:      Lips: Pink  No lesions  Mouth: Mucous membranes are moist  No lacerations or angioedema  Tongue: No lesions  Tongue does not deviate from midline  Palate: No mass and lesions  Pharynx: Oropharynx is clear  Uvula midline  No pharyngeal swelling, oropharyngeal exudate, posterior oropharyngeal erythema or uvula swelling  Tonsils: No tonsillar exudate or tonsillar abscesses  Eyes:      General: Lids are normal  Vision grossly intact  Gaze aligned appropriately  No visual field deficit or scleral icterus  Right eye: No discharge  Left eye: No discharge  Extraocular Movements: Extraocular movements intact  Right eye: No nystagmus  Left eye: No nystagmus  Conjunctiva/sclera: Conjunctivae normal       Right eye: Right conjunctiva is not injected  Left eye: Left conjunctiva is not injected  Pupils: Pupils are equal, round, and reactive to light  Neck:      Musculoskeletal: Full passive range of motion without pain, normal range of motion and neck supple  No neck rigidity, spinous process tenderness or muscular tenderness  Thyroid: No thyroid mass or thyromegaly  Trachea: Trachea and phonation normal    Cardiovascular:      Rate and Rhythm: Normal rate and regular rhythm  Pulses: Normal pulses  Radial pulses are 2+ on the right side and 2+ on the left side  Dorsalis pedis pulses are 2+ on the right side and 2+ on the left side  Heart sounds: Normal heart sounds, S1 normal and S2 normal    Pulmonary:      Effort: Pulmonary effort is normal  No tachypnea, accessory muscle usage, respiratory distress or retractions  Breath sounds: Normal breath sounds and air entry  No stridor or decreased air movement  No decreased breath sounds, wheezing, rhonchi or rales  Chest:      Chest wall: No mass, lacerations, deformity, swelling, tenderness, crepitus or edema  There is no dullness to percussion  Abdominal:      General: Abdomen is flat  Bowel sounds are normal  There is no distension  Palpations: Abdomen is soft  Abdomen is not rigid  There is no hepatomegaly, splenomegaly or mass  Tenderness: There is no abdominal tenderness  There is no right CVA tenderness, left CVA tenderness, guarding or rebound  Negative signs include Francis's sign and McBurney's sign  Hernia: No hernia is present  Musculoskeletal: Normal range of motion  General: No swelling, tenderness, deformity or signs of injury  Right lower leg: She exhibits no tenderness  No edema  Left lower leg: She exhibits no tenderness  No edema  Lymphadenopathy:      Cervical: No cervical adenopathy     Skin:     General: Skin is warm and dry  Capillary Refill: Capillary refill takes less than 2 seconds  Coloration: Skin is not ashen, cyanotic, jaundiced, mottled, pale or sallow  Findings: No abrasion, abscess, acne, bruising, burn, ecchymosis, erythema, signs of injury, laceration, lesion, petechiae, rash or wound  Rash is not macular or papular  Neurological:      General: No focal deficit present  Mental Status: She is alert and oriented to person, place, and time  Mental status is at baseline  She is not disoriented  GCS: GCS eye subscore is 4  GCS verbal subscore is 5  GCS motor subscore is 6  Cranial Nerves: Cranial nerves are intact  No cranial nerve deficit, dysarthria or facial asymmetry  Sensory: Sensation is intact  No sensory deficit  Motor: Motor function is intact  No weakness, tremor, atrophy, abnormal muscle tone or seizure activity  Coordination: Coordination is intact  Coordination normal       Gait: Gait is intact  Gait normal       Comments: Patient is AAOx4, GCS 15; speaking clearly and appropriately; motor and sensation intact; visual fields intact; cranial nerves II-XII grossly intact; no facial droop, slurred speech or arm drift   Psychiatric:         Attention and Perception: Attention and perception normal  She is attentive  Mood and Affect: Mood and affect normal          Speech: Speech normal          Behavior: Behavior normal  Behavior is cooperative  Thought Content:  Thought content normal          Cognition and Memory: Cognition and memory normal          Judgment: Judgment normal          Vital Signs  ED Triage Vitals [04/26/21 2244]   Temperature Pulse Respirations Blood Pressure SpO2   (!) 97 2 °F (36 2 °C) (!) 125 20 (!) 228/121 95 %      Temp Source Heart Rate Source Patient Position - Orthostatic VS BP Location FiO2 (%)   Temporal Monitor Lying Right arm --      Pain Score       8           Vitals:    04/26/21 2330 04/26/21 2345 04/27/21 0000 04/27/21 0044   BP: (!) 187/91 169/90 159/84 152/77   Pulse: 94 95 93 95   Patient Position - Orthostatic VS: Lying            Visual Acuity      ED Medications  Medications   sodium chloride (PF) 0 9 % injection 3 mL (has no administration in time range)   heparin (porcine) subcutaneous injection 5,000 Units (has no administration in time range)   insulin lispro (HumaLOG) 100 units/mL subcutaneous injection 1-6 Units (has no administration in time range)   insulin lispro (HumaLOG) 100 units/mL subcutaneous injection 1-6 Units (has no administration in time range)   ondansetron (ZOFRAN) injection 4 mg (has no administration in time range)   aluminum-magnesium hydroxide-simethicone (MYLANTA) oral suspension 30 mL (has no administration in time range)   docusate sodium (COLACE) capsule 100 mg (has no administration in time range)   acetaminophen (TYLENOL) tablet 650 mg (has no administration in time range)   Labetalol HCl (NORMODYNE) injection 10 mg (has no administration in time range)   aspirin chewable tablet 324 mg (324 mg Oral Given 4/26/21 2302)   sodium chloride 0 9 % bolus 1,000 mL (1,000 mL Intravenous New Bag 4/26/21 2301)   Labetalol HCl (NORMODYNE) injection 10 mg (10 mg Intravenous Given 4/26/21 2304)   nitroglycerin (NITROSTAT) SL tablet 0 4 mg (0 4 mg Sublingual Given 4/26/21 2334)   Labetalol HCl (NORMODYNE) injection 10 mg (10 mg Intravenous Given 4/27/21 0002)       Diagnostic Studies  Results Reviewed     Procedure Component Value Units Date/Time    Troponin I repeat in 3hrs [[id number]]  (Normal) Collected: 04/27/21 0148    Lab Status: Final result Specimen: Blood from Arm, Left Updated: 04/27/21 0215     Troponin I <0 02 ng/mL     Rapid drug screen, urine [[id number]]  (Normal) Collected: 04/26/21 2303    Lab Status: Final result Specimen: Urine, Clean Catch Updated: 04/27/21 0055     Amph/Meth UR Negative     Barbiturate Ur Negative     Benzodiazepine Urine Negative     Cocaine Urine Negative Methadone Urine Negative     Opiate Urine Negative     PCP Ur Negative     THC Urine Negative     Oxycodone Urine Negative    Narrative:      FOR MEDICAL PURPOSES ONLY  IF CONFIRMATION NEEDED PLEASE CONTACT THE LAB WITHIN 5 DAYS  Drug Screen Cutoff Levels:  AMPHETAMINE/METHAMPHETAMINES  1000 ng/mL  BARBITURATES     200 ng/mL  BENZODIAZEPINES     200 ng/mL  COCAINE      300 ng/mL  METHADONE      300 ng/mL  OPIATES      300 ng/mL  PHENCYCLIDINE     25 ng/mL  THC       50 ng/mL  OXYCODONE      100 ng/mL    Urine Microscopic [[id number]] Collected: 04/26/21 2303    Lab Status: Final result Specimen: Urine, Clean Catch Updated: 04/26/21 2338     RBC, UA 0-1 /hpf      WBC, UA None Seen /hpf      Epithelial Cells Occasional /hpf      Bacteria, UA Occasional /hpf      AMORPH PHOSPATES Occasional /hpf     hCG, qualitative pregnancy [[id number]]  (Normal) Collected: 04/26/21 2257    Lab Status: Final result Specimen: Blood from Arm, Right Updated: 04/26/21 2330     Preg, Serum Negative    TSH, 3rd generation [[id number]]  (Abnormal) Collected: 04/26/21 2257    Lab Status: Final result Specimen: Blood from Arm, Right Updated: 04/26/21 2329     TSH 3RD GENERATON 8 325 uIU/mL     Narrative:      Patients undergoing fluorescein dye angiography may retain small amounts of fluorescein in the body for 48-72 hours post procedure  Samples containing fluorescein can produce falsely depressed TSH values  If the patient had this procedure,a specimen should be resubmitted post fluorescein clearance        Troponin I [[id number]]  (Normal) Collected: 04/26/21 2257    Lab Status: Final result Specimen: Blood from Arm, Right Updated: 04/26/21 2324     Troponin I <0 02 ng/mL     Comprehensive metabolic panel [[id number]]  (Abnormal) Collected: 04/26/21 2257    Lab Status: Final result Specimen: Blood from Arm, Right Updated: 04/26/21 2323     Sodium 138 mmol/L      Potassium 3 6 mmol/L      Chloride 101 mmol/L      CO2 30 mmol/L      ANION GAP 7 mmol/L      BUN 12 mg/dL      Creatinine 0 79 mg/dL      Glucose 195 mg/dL      Calcium 9 4 mg/dL      AST 11 U/L      ALT 20 U/L      Alkaline Phosphatase 104 U/L      Total Protein 8 2 g/dL      Albumin 3 8 g/dL      Total Bilirubin 0 38 mg/dL      eGFR 89 ml/min/1 73sq m     Narrative:      [place] guidelines for Chronic Kidney Disease (CKD):     Stage 1 with normal or high GFR (GFR > 90 mL/min/1 73 square meters)    Stage 2 Mild CKD (GFR = 60-89 mL/min/1 73 square meters)    Stage 3A Moderate CKD (GFR = 45-59 mL/min/1 73 square meters)    Stage 3B Moderate CKD (GFR = 30-44 mL/min/1 73 square meters)    Stage 4 Severe CKD (GFR = 15-29 mL/min/1 73 square meters)    Stage 5 End Stage CKD (GFR <15 mL/min/1 73 square meters)  Note: GFR calculation is accurate only with a steady state creatinine    Lipase [[id number]]  (Normal) Collected: 04/26/21 2257    Lab Status: Final result Specimen: Blood from Arm, Right Updated: 04/26/21 2323     Lipase 140 u/L     Magnesium [[id number]]  (Normal) Collected: 04/26/21 2257    Lab Status: Final result Specimen: Blood from Arm, Right Updated: 04/26/21 2323     Magnesium 2 1 mg/dL     CK (with reflex to MB) [[id number]]  (Normal) Collected: 04/26/21 2257    Lab Status: Final result Specimen: Blood from Arm, Right Updated: 04/26/21 2323     Total  U/L     D-dimer, quantitative [[id number]]  (Normal) Collected: 04/26/21 2257    Lab Status: Final result Specimen: Blood from Arm, Right Updated: 04/26/21 2320     D-Dimer, Quant 0 38 ug/ml FEU     Protime-INR [[id number]]  (Normal) Collected: 04/26/21 2257    Lab Status: Final result Specimen: Blood from Arm, Right Updated: 04/26/21 2319     Protime 12 5 seconds      INR 0 95    UA w Reflex to Microscopic w Reflex to Culture [[id number]]  (Abnormal) Collected: 04/26/21 2303    Lab Status: Final result Specimen: Urine, Clean Catch Updated: 04/26/21 5137     Color, UA Yellow     Clarity, UA Clear Specific Willard, UA 1 010     pH, UA 7 0     Leukocytes, UA Negative     Nitrite, UA Negative     Protein, UA Negative mg/dl      Glucose,  (1/10%) mg/dl      Ketones, UA Negative mg/dl      Urobilinogen, UA 0 2 E U /dl      Bilirubin, UA Negative     Blood, UA Small    CBC and differential [[id number]]  (Abnormal) Collected: 04/26/21 2257    Lab Status: Final result Specimen: Blood from Arm, Right Updated: 04/26/21 2309     WBC 10 41 Thousand/uL      RBC 5 49 Million/uL      Hemoglobin 17 1 g/dL      Hematocrit 50 0 %      MCV 91 fL      MCH 31 1 pg      MCHC 34 2 g/dL      RDW 12 5 %      MPV 8 7 fL      Platelets 771 Thousands/uL      nRBC 0 /100 WBCs      Neutrophils Relative 55 %      Immat GRANS % 1 %      Lymphocytes Relative 35 %      Monocytes Relative 6 %      Eosinophils Relative 2 %      Basophils Relative 1 %      Neutrophils Absolute 5 83 Thousands/µL      Immature Grans Absolute 0 07 Thousand/uL      Lymphocytes Absolute 3 68 Thousands/µL      Monocytes Absolute 0 60 Thousand/µL      Eosinophils Absolute 0 17 Thousand/µL      Basophils Absolute 0 06 Thousands/µL                  CT head without contrast   Final Result by Femi Schilling DO (04/26 2337)      No acute intracranial abnormality  Workstation performed: [id number]         XR chest 1 view    (Results Pending)   US right upper quadrant    (Results Pending)              Procedures  ECG 12 Lead Documentation Only    Date/Time: 4/26/2021 10:48 PM  Performed by: Clyde Puga MD  Authorized by:  Clyde Puga MD     Indications / Diagnosis:  Cp  ECG reviewed by me, the ED Provider: yes    Patient location:  ED  Previous ECG:     Comparison to cardiac monitor: Yes    Interpretation:     Interpretation: normal    Rate:     ECG rate:  110bpm    ECG rate assessment: tachycardic    Rhythm:     Rhythm: sinus tachycardia    Ectopy:     Ectopy: none    QRS:     QRS axis:  Right  Conduction:     Conduction: normal    ST segments:     ST segments:  Normal  T waves:     T waves: flattening and inverted      Flattening:  AVL and V1    Inverted:  AVR  Comments:      No STEMI  ID 188ms  QRS 86ms  QT 460ms    Cardiac monitoring ordered  Heart rate and rhythm as above  I have personally read and interpreted the EKG as above  ED Course  ED Course as of Apr 27 0537   Mon Apr 26, 2021   2338 Hcg negative      2341 Reassessed patient  She still has 5/10 chest pain after ASA 324mg PO and one SL nitro  Will give morphine 2mg IV and reassess  2342 HEART Score 3      2342 Texted Dr Anne Carrera, Hospitalist for MS observation for rule out acute coronary syndrome  Patient will also need thyroid work-up and evaluation for hypertension  Tue Apr 27, 2021   0002 Delta troponin at 0200       0002 Dr Anne Carrera accepts patient to MS observation  Updated patient                  MDM  Number of Diagnoses or Management Options     Amount and/or Complexity of Data Reviewed  Clinical lab tests: ordered and reviewed  Tests in the radiology section of CPT®: ordered and reviewed  Tests in the medicine section of CPT®: reviewed and ordered  Obtain history from someone other than the patient: yes ( at bedside)  Review and summarize past medical records: yes  Discuss the patient with other providers: yes  Independent visualization of images, tracings, or specimens: yes (Chest x-ray, EKG)    Risk of Complications, Morbidity, and/or Mortality  Presenting problems: moderate  Diagnostic procedures: moderate  Management options: moderate    Patient Progress  Patient progress: stable      Disposition  Final diagnoses:   Chest pain   Abnormal serum thyroid stimulating hormone (TSH) level   Hypertension, unspecified type     Time reflects when diagnosis was documented in both MDM as applicable and the Disposition within this note     Time User Action Codes Description Comment    4/26/2021 11:45 PM Sebas Paige Add [R07 9] Chest pain     4/26/2021 11:46 PM Alessandrabob Orozco Add [R79 89] Abnormal serum thyroid stimulating hormone (TSH) level     4/26/2021 11:46 PM Alessandrabob Orozco Add [I10] Hypertension, unspecified type       ED Disposition     ED Disposition Condition Date/Time Comment    Admit Stable Tue Apr 27, 2021 12:03 AM Case was discussed with Dr Vic Mathew and the patient's admission status was agreed to be Admission Status: observation status to the service of Dr Vic Mathew    Follow-up Information    None         Current Discharge Medication List      CONTINUE these medications which have NOT CHANGED    Details   ibuprofen (MOTRIN) 600 mg tablet Take 600 mg by mouth every 6 (six) hours as needed for mild pain or headaches      ondansetron (ZOFRAN) 4 mg tablet Take 1 tablet (4 mg total) by mouth every 8 (eight) hours as needed for nausea or vomiting  Qty: 20 tablet, Refills: 0    Associated Diagnoses: Left flank pain; Nephrolithiasis; Intractable pain      oxyCODONE-acetaminophen (PERCOCET) 5-325 mg per tablet Take 1 tablet by mouth every 4 (four) hours as needed for moderate painMax Daily Amount: 6 tablets  Qty: 12 tablet, Refills: 0    Associated Diagnoses: Left flank pain; Nephrolithiasis; Intractable pain           No discharge procedures on file      PDMP Review     None          ED Provider  Electronically Signed by      Ludwig Headings, MD Cheryle Gutting, MD  04/27/21 8964

## 2021-04-27 NOTE — ASSESSMENT & PLAN NOTE
Patient has multiple chronic medical conditions which are likely to be exacerbated by noncompliance with medical recommendations  She also has a recent history of signing out from hospital AMA and declining evidence-based treatment for medical issues  Discussed importance of adherence to medical guidance in order to optimize health

## 2021-04-27 NOTE — ASSESSMENT & PLAN NOTE
Mildly elevated TSH (8 5) at time of presentation  No prior lab results available; unclear chronicity  Patient does not appear to have any associated symptoms  Possible mild hypothyroidism vs acute reaction  Recommend retest no earlier than 4 weeks from now    Patient also needs to follow-up with PCP for further management

## 2021-04-27 NOTE — DISCHARGE INSTRUCTIONS
Chest Pain   WHAT YOU NEED TO KNOW:   Chest pain can be caused by a range of conditions, from not serious to life-threatening  Chest pain can be a symptom of a digestive problem, such as acid reflux or a stomach ulcer  An anxiety attack or a strong emotion, such as anger, can also cause chest pain  Infection, inflammation, or a fracture in the bones or cartilage in your chest can cause pain or discomfort  Sometimes chest pain or pressure is caused by poor blood flow to your heart (angina)  Chest pain may also be caused by life-threatening conditions such as a heart attack or blood clot in your lungs  DISCHARGE INSTRUCTIONS:   Call your local emergency number (911 in the [address] [facility]) or have someone call if:   · You have any of the following signs of a heart attack:      ? Squeezing, pressure, or pain in your chest    ? You may  also have any of the following:     § Discomfort or pain in your back, neck, jaw, stomach, or arm    § Shortness of breath    § Nausea or vomiting    § Lightheadedness or a sudden cold sweat      Return to the emergency department if:   · You have chest discomfort that gets worse, even with medicine  · You cough or vomit blood  · Your bowel movements are black or bloody  · You cannot stop vomiting, or it hurts to swallow  Call your doctor if:   · You have questions or concerns about your condition or care  Medicines:   · Medicines  may be given to treat the cause of your chest pain  Examples include pain medicine, anxiety medicine, or medicines to increase blood flow to your heart  · Do not take certain medicines without asking your healthcare provider first   These include NSAIDs, herbal or vitamin supplements, or hormones (estrogen or progestin)  · Take your medicine as directed  Contact your healthcare provider if you think your medicine is not helping or if you have side effects  Tell him or her if you are allergic to any medicine   Keep a list of the medicines, vitamins, and herbs you take  Include the amounts, and when and why you take them  Bring the list or the pill bottles to follow-up visits  Carry your medicine list with you in case of an emergency  Healthy living tips: The following are general healthy guidelines  If the cause of your chest pain is known, your healthcare provider will give you specific guidelines to follow  · Do not smoke  Nicotine and other chemicals in cigarettes and cigars can cause lung and heart damage  Ask your healthcare provider for information if you currently smoke and need help to quit  E-cigarettes or smokeless tobacco still contain nicotine  Talk to your healthcare provider before you use these products  · Choose a variety of healthy foods as often as possible  Include fresh, frozen, or canned fruits and vegetables  Also include low-fat dairy products, fish, chicken (without skin), and lean meats  Your healthcare provider or a dietitian can help you create meal plans  You may need to avoid certain foods or drinks if your pain is caused by a digestion problem  · Lower your sodium (salt) intake  Limit foods that are high in sodium, such as canned foods, salty snacks, and cold cuts  If you add salt when you cook food, do not add more at the table  Choose low-sodium canned foods as much as possible  · Drink plenty of water every day  Water helps your body to control your temperature and blood pressure  Ask your healthcare provider how much water you should drink every day  · Ask about activity  Your healthcare provider will tell you which activities to limit or avoid  Ask when you can drive, return to work, and have sex  Ask about the best exercise plan for you  · Maintain a healthy weight  Ask your healthcare provider what a healthy weight is for you  Ask him or her to help you create a safe weight loss plan if you are overweight  · Ask about vaccines you may need    Get the influenza (flu) vaccine every year as soon as recommended, usually in September or October  You may also need a pneumococcal vaccine to prevent pneumonia  The vaccine is usually given every 5 years, starting at age 72  Your healthcare provider can tell you if should get other vaccines, and when to get them  Follow up with your healthcare provider within 72 hours, or as directed: You may need to return for more tests to find the cause of your chest pain  You may be referred to a specialist, such as a cardiologist or gastroenterologist  Write down your questions so you remember to ask them during your visits  © Copyright [employer] 2021 Information is for End User's use only and may not be sold, redistributed or otherwise used for commercial purposes  All illustrations and images included in CareNotes® are the copyrighted property of [employer]  or Leatha Galindo  The above information is an  only  It is not intended as medical advice for individual conditions or treatments  Talk to your doctor, nurse or pharmacist before following any medical regimen to see if it is safe and effective for you

## 2021-05-02 LAB
ATRIAL RATE: 110 BPM
P AXIS: 76 DEGREES
PR INTERVAL: 188 MS
QRS AXIS: 94 DEGREES
QRSD INTERVAL: 86 MS
QT INTERVAL: 340 MS
QTC INTERVAL: 460 MS
T WAVE AXIS: 56 DEGREES
VENTRICULAR RATE: 110 BPM

## 2021-05-02 PROCEDURE — 93010 ELECTROCARDIOGRAM REPORT: CPT | Performed by: INTERNAL MEDICINE

## 2021-05-07 ENCOUNTER — APPOINTMENT (EMERGENCY)
Dept: CT IMAGING | Facility: HOSPITAL | Age: 51
End: 2021-05-07
Payer: COMMERCIAL

## 2021-05-07 ENCOUNTER — HOSPITAL ENCOUNTER (EMERGENCY)
Facility: HOSPITAL | Age: 51
Discharge: HOME | End: 2021-05-07
Attending: EMERGENCY MEDICINE | Admitting: EMERGENCY MEDICINE
Payer: COMMERCIAL

## 2021-05-07 ENCOUNTER — APPOINTMENT (EMERGENCY)
Dept: RADIOLOGY | Facility: HOSPITAL | Age: 51
End: 2021-05-07
Payer: COMMERCIAL

## 2021-05-07 VITALS
SYSTOLIC BLOOD PRESSURE: 141 MMHG | BODY MASS INDEX: 39.91 KG/M2 | OXYGEN SATURATION: 95 % | HEART RATE: 101 BPM | TEMPERATURE: 96.7 F | WEIGHT: 225.31 LBS | DIASTOLIC BLOOD PRESSURE: 78 MMHG | RESPIRATION RATE: 18 BRPM

## 2021-05-07 DIAGNOSIS — J18.9 PNEUMONIA: ICD-10-CM

## 2021-05-07 DIAGNOSIS — R10.9 ABDOMINAL PAIN: Primary | ICD-10-CM

## 2021-05-07 LAB
ALBUMIN SERPL BCP-MCNC: 3.5 G/DL (ref 3.5–5)
ALP SERPL-CCNC: 89 U/L (ref 46–116)
ALT SERPL W P-5'-P-CCNC: 16 U/L (ref 12–78)
ANION GAP SERPL CALCULATED.3IONS-SCNC: 8 MMOL/L (ref 4–13)
AST SERPL W P-5'-P-CCNC: 9 U/L (ref 5–45)
ATRIAL RATE: 93 BPM
BASOPHILS # BLD AUTO: 0.05 THOUSANDS/ΜL (ref 0–0.1)
BASOPHILS NFR BLD AUTO: 1 % (ref 0–1)
BILIRUB SERPL-MCNC: 0.6 MG/DL (ref 0.2–1)
BILIRUB UR QL STRIP: NEGATIVE
BUN SERPL-MCNC: 9 MG/DL (ref 5–25)
CALCIUM SERPL-MCNC: 9.4 MG/DL (ref 8.3–10.1)
CHLORIDE SERPL-SCNC: 101 MMOL/L (ref 100–108)
CLARITY UR: CLEAR
CO2 SERPL-SCNC: 28 MMOL/L (ref 21–32)
COLOR UR: YELLOW
CREAT SERPL-MCNC: 0.73 MG/DL (ref 0.6–1.3)
EOSINOPHIL # BLD AUTO: 0.12 THOUSAND/ΜL (ref 0–0.61)
EOSINOPHIL NFR BLD AUTO: 1 % (ref 0–6)
ERYTHROCYTE [DISTWIDTH] IN BLOOD BY AUTOMATED COUNT: 12.1 % (ref 11.6–15.1)
GFR SERPL CREATININE-BSD FRML MDRD: 98 ML/MIN/1.73SQ M
GLUCOSE SERPL-MCNC: 150 MG/DL (ref 65–140)
GLUCOSE UR STRIP-MCNC: NEGATIVE MG/DL
HCT VFR BLD AUTO: 45.4 % (ref 34.8–46.1)
HGB BLD-MCNC: 15.8 G/DL (ref 11.5–15.4)
HGB UR QL STRIP.AUTO: NEGATIVE
IMM GRANULOCYTES # BLD AUTO: 0.03 THOUSAND/UL (ref 0–0.2)
IMM GRANULOCYTES NFR BLD AUTO: 0 % (ref 0–2)
KETONES UR STRIP-MCNC: ABNORMAL MG/DL
LACTATE SERPL-SCNC: 0.7 MMOL/L (ref 0.5–2)
LEUKOCYTE ESTERASE UR QL STRIP: NEGATIVE
LIPASE SERPL-CCNC: 60 U/L (ref 73–393)
LYMPHOCYTES # BLD AUTO: 2.79 THOUSANDS/ΜL (ref 0.6–4.47)
LYMPHOCYTES NFR BLD AUTO: 33 % (ref 14–44)
MCH RBC QN AUTO: 31.5 PG (ref 26.8–34.3)
MCHC RBC AUTO-ENTMCNC: 34.8 G/DL (ref 31.4–37.4)
MCV RBC AUTO: 91 FL (ref 82–98)
MONOCYTES # BLD AUTO: 0.65 THOUSAND/ΜL (ref 0.17–1.22)
MONOCYTES NFR BLD AUTO: 8 % (ref 4–12)
NEUTROPHILS # BLD AUTO: 4.86 THOUSANDS/ΜL (ref 1.85–7.62)
NEUTS SEG NFR BLD AUTO: 57 % (ref 43–75)
NITRITE UR QL STRIP: NEGATIVE
NRBC BLD AUTO-RTO: 0 /100 WBCS
P AXIS: 73 DEGREES
PH UR STRIP.AUTO: 5.5 [PH]
PLATELET # BLD AUTO: 247 THOUSANDS/UL (ref 149–390)
PMV BLD AUTO: 8.9 FL (ref 8.9–12.7)
POTASSIUM SERPL-SCNC: 3.7 MMOL/L (ref 3.5–5.3)
PR INTERVAL: 196 MS
PROT SERPL-MCNC: 8 G/DL (ref 6.4–8.2)
PROT UR STRIP-MCNC: NEGATIVE MG/DL
QRS AXIS: 79 DEGREES
QRSD INTERVAL: 88 MS
QT INTERVAL: 392 MS
QTC INTERVAL: 487 MS
RBC # BLD AUTO: 5.01 MILLION/UL (ref 3.81–5.12)
SARS-COV-2 RNA RESP QL NAA+PROBE: NEGATIVE
SODIUM SERPL-SCNC: 137 MMOL/L (ref 136–145)
SP GR UR STRIP.AUTO: <=1.005 (ref 1–1.03)
T WAVE AXIS: 65 DEGREES
TROPONIN I SERPL-MCNC: <0.02 NG/ML
UROBILINOGEN UR QL STRIP.AUTO: 0.2 E.U./DL
VENTRICULAR RATE: 93 BPM
WBC # BLD AUTO: 8.5 THOUSAND/UL (ref 4.31–10.16)

## 2021-05-07 PROCEDURE — 96374 THER/PROPH/DIAG INJ IV PUSH: CPT

## 2021-05-07 PROCEDURE — 74177 CT ABD & PELVIS W/CONTRAST: CPT

## 2021-05-07 PROCEDURE — 80053 COMPREHEN METABOLIC PANEL: CPT | Performed by: EMERGENCY MEDICINE

## 2021-05-07 PROCEDURE — 93005 ELECTROCARDIOGRAM TRACING: CPT

## 2021-05-07 PROCEDURE — 96375 TX/PRO/DX INJ NEW DRUG ADDON: CPT

## 2021-05-07 PROCEDURE — 36415 COLL VENOUS BLD VENIPUNCTURE: CPT | Performed by: EMERGENCY MEDICINE

## 2021-05-07 PROCEDURE — 83690 ASSAY OF LIPASE: CPT | Performed by: EMERGENCY MEDICINE

## 2021-05-07 PROCEDURE — 85025 COMPLETE CBC W/AUTO DIFF WBC: CPT | Performed by: EMERGENCY MEDICINE

## 2021-05-07 PROCEDURE — 93010 ELECTROCARDIOGRAM REPORT: CPT | Performed by: INTERNAL MEDICINE

## 2021-05-07 PROCEDURE — U0003 INFECTIOUS AGENT DETECTION BY NUCLEIC ACID (DNA OR RNA); SEVERE ACUTE RESPIRATORY SYNDROME CORONAVIRUS 2 (SARS-COV-2) (CORONAVIRUS DISEASE [COVID-19]), AMPLIFIED PROBE TECHNIQUE, MAKING USE OF HIGH THROUGHPUT TECHNOLOGIES AS DESCRIBED BY CMS-2020-01-R: HCPCS | Performed by: EMERGENCY MEDICINE

## 2021-05-07 PROCEDURE — 83605 ASSAY OF LACTIC ACID: CPT | Performed by: EMERGENCY MEDICINE

## 2021-05-07 PROCEDURE — U0005 INFEC AGEN DETEC AMPLI PROBE: HCPCS | Performed by: EMERGENCY MEDICINE

## 2021-05-07 PROCEDURE — 96361 HYDRATE IV INFUSION ADD-ON: CPT

## 2021-05-07 PROCEDURE — 99285 EMERGENCY DEPT VISIT HI MDM: CPT

## 2021-05-07 PROCEDURE — 99285 EMERGENCY DEPT VISIT HI MDM: CPT | Performed by: EMERGENCY MEDICINE

## 2021-05-07 PROCEDURE — 71045 X-RAY EXAM CHEST 1 VIEW: CPT

## 2021-05-07 PROCEDURE — 81003 URINALYSIS AUTO W/O SCOPE: CPT | Performed by: EMERGENCY MEDICINE

## 2021-05-07 PROCEDURE — 84484 ASSAY OF TROPONIN QUANT: CPT | Performed by: EMERGENCY MEDICINE

## 2021-05-07 RX ORDER — ONDANSETRON 2 MG/ML
4 INJECTION INTRAMUSCULAR; INTRAVENOUS ONCE
Status: COMPLETED | OUTPATIENT
Start: 2021-05-07 | End: 2021-05-07

## 2021-05-07 RX ORDER — ONDANSETRON 4 MG/1
4 TABLET, FILM COATED ORAL EVERY 6 HOURS PRN
Qty: 10 TABLET | Refills: 0 | Status: SHIPPED | OUTPATIENT
Start: 2021-05-07

## 2021-05-07 RX ORDER — AZITHROMYCIN 250 MG/1
TABLET, FILM COATED ORAL
Qty: 6 TABLET | Refills: 0 | Status: SHIPPED | OUTPATIENT
Start: 2021-05-07 | End: 2021-05-11

## 2021-05-07 RX ORDER — MORPHINE SULFATE 4 MG/ML
4 INJECTION, SOLUTION INTRAMUSCULAR; INTRAVENOUS ONCE
Status: COMPLETED | OUTPATIENT
Start: 2021-05-07 | End: 2021-05-07

## 2021-05-07 RX ORDER — KETOROLAC TROMETHAMINE 30 MG/ML
10 INJECTION, SOLUTION INTRAMUSCULAR; INTRAVENOUS ONCE
Status: COMPLETED | OUTPATIENT
Start: 2021-05-07 | End: 2021-05-07

## 2021-05-07 RX ORDER — MORPHINE SULFATE 15 MG/1
15 TABLET ORAL EVERY 8 HOURS PRN
Qty: 4 TABLET | Refills: 0 | Status: SHIPPED | OUTPATIENT
Start: 2021-05-07

## 2021-05-07 RX ADMIN — MORPHINE SULFATE 4 MG: 4 INJECTION INTRAVENOUS at 05:13

## 2021-05-07 RX ADMIN — ONDANSETRON 4 MG: 2 INJECTION INTRAMUSCULAR; INTRAVENOUS at 05:13

## 2021-05-07 RX ADMIN — IOHEXOL 100 ML: 350 INJECTION, SOLUTION INTRAVENOUS at 05:53

## 2021-05-07 RX ADMIN — KETOROLAC TROMETHAMINE 9.9 MG: 30 INJECTION, SOLUTION INTRAMUSCULAR at 06:34

## 2021-05-07 RX ADMIN — SODIUM CHLORIDE 1000 ML: 0.9 INJECTION, SOLUTION INTRAVENOUS at 05:16

## 2021-05-07 NOTE — ED PROVIDER NOTES
History  Chief Complaint   Patient presents with    Abdominal Pain     Patient has RUQ abdominal pain  Patient denies NVD at this time  73-year-old female with spells complains of severe right upper quadrant abdominal pain that began subacutely and epigastrically with heartburn at about 4:30 pm last evening after eating lunch about 2:30 p m , salad  States pain is same as prior when recently evaluated at emergency department and hospitalized, followed up with family physician as an outpatient  No nausea or vomiting  No fever chills  No loss of taste or smell  She denies hematuria dysuria, frequency  No constipation or diarrhea recently  History of GERD, endoscopy 15 y/a  Not amenable to ibuprofen 800 mg about a 1/2 hour prior to arrival     Past medical history includes diabetes, nephrolithiasis  Surgical history includes appendectomy,       History provided by:  Patient  Abdominal Pain  Pain location:  Epigastric and RUQ  Pain quality: cramping    Pain quality comment:  Spasming  Pain radiates to:  Does not radiate  Pain severity:  Severe  Onset quality:  Gradual  Timing:  Constant  Progression:  Worsening  Chronicity:  Recurrent  Context: not trauma    Relieved by:  Nothing  Worsened by:  Palpation  Ineffective treatments:  NSAIDs  Associated symptoms: no chest pain, no fever and no shortness of breath    Risk factors: recent hospitalization        Prior to Admission Medications   Prescriptions Last Dose Informant Patient Reported?  Taking?   aspirin (ECOTRIN LOW STRENGTH) 81 mg EC tablet   No No   Sig: Take 1 tablet (81 mg total) by mouth daily With food   metFORMIN (GLUCOPHAGE) 1000 MG tablet   No Yes   Sig: Take 1 tablet (1,000 mg total) by mouth 2 (two) times a day with meals   nitroglycerin (NITROSTAT) 0 4 mg SL tablet   No No   Sig: Place 1 tablet (0 4 mg total) under the tongue every 5 (five) minutes as needed for chest pain      Facility-Administered Medications: None       Past Medical History:   Diagnosis Date    Dyslipidemia associated with type 2 diabetes mellitus ([facility] ) 2021    Elevated TSH 2021    High cholesterol     Hypertensive urgency 2021    Morbid obesity with BMI of 40 0-44 9, adult ([facility] ) 2021    Noncompliance with treatment plan 2021    Precordial chest pain 2021    RUQ abdominal pain 2021    Type 2 diabetes mellitus with hyperglycemia, without long-term current use of insulin ([facility] ) 2/3/2021       Past Surgical History:   Procedure Laterality Date    APPENDECTOMY       SECTION      DILATION AND CURETTAGE OF UTERUS         History reviewed  No pertinent family history  I have reviewed and agree with the history as documented  E-Cigarette/Vaping    E-Cigarette Use Never User      E-Cigarette/Vaping Substances     Social History     Tobacco Use    Smoking status: Current Every Day Smoker     Packs/day: 1 00     Types: Cigarettes    Smokeless tobacco: Never Used   Substance Use Topics    Alcohol use: Never     Frequency: Never    Drug use: Never       Review of Systems   Constitutional: Negative for fever  Respiratory: Negative for shortness of breath  Cardiovascular: Negative for chest pain  Gastrointestinal: Positive for abdominal pain  All other systems reviewed and are negative  Physical Exam  Physical Exam  Vitals signs and nursing note reviewed  Constitutional:       Comments: Pleasant, uncomfortable-appearing, holding right upper quadrant of abdomen   HENT:      Head: Normocephalic and atraumatic  Eyes:      Conjunctiva/sclera: Conjunctivae normal       Pupils: Pupils are equal, round, and reactive to light  Neck:      Musculoskeletal: Neck supple  Cardiovascular:      Rate and Rhythm: Normal rate and regular rhythm  Heart sounds: Normal heart sounds  Pulmonary:      Effort: Pulmonary effort is normal       Breath sounds: Normal breath sounds     Abdominal:      General: Bowel sounds are normal  There is no distension  Palpations: Abdomen is soft  Tenderness: There is abdominal tenderness in the right upper quadrant  There is no guarding or rebound  Musculoskeletal: Normal range of motion  Skin:     General: Skin is warm and dry  Neurological:      Mental Status: She is alert and oriented to person, place, and time  Cranial Nerves: No cranial nerve deficit  Coordination: Coordination normal    Psychiatric:         Behavior: Behavior normal          Thought Content:  Thought content normal          Judgment: Judgment normal          Vital Signs  ED Triage Vitals   Temperature Pulse Respirations Blood Pressure SpO2   05/07/21 0446 05/07/21 0451 05/07/21 0446 05/07/21 0451 05/07/21 0451   (!) 96 7 °F (35 9 °C) 101 20 141/78 95 %      Temp Source Heart Rate Source Patient Position - Orthostatic VS BP Location FiO2 (%)   05/07/21 0446 05/07/21 0451 05/07/21 0451 05/07/21 0451 --   Temporal Monitor Lying Right arm       Pain Score       05/07/21 0446       8           Vitals:    05/07/21 0451   BP: 141/78   Pulse: 101   Patient Position - Orthostatic VS: Lying         Visual Acuity      ED Medications  Medications   sodium chloride 0 9 % bolus 1,000 mL (1,000 mL Intravenous New Bag 5/7/21 0516)   morphine (PF) 4 mg/mL injection 4 mg (4 mg Intravenous Given 5/7/21 0513)   ondansetron (ZOFRAN) injection 4 mg (4 mg Intravenous Given 5/7/21 0513)   iohexol (OMNIPAQUE) 350 MG/ML injection (SINGLE-DOSE) 100 mL (100 mL Intravenous Given 5/7/21 0753)   ketorolac (TORADOL) injection 9 9 mg (9 9 mg Intravenous Given 5/7/21 1331)       Diagnostic Studies  Results Reviewed     Procedure Component Value Units Date/Time    UA (URINE) with reflex to Scope [[id number]]  (Abnormal) Collected: 05/07/21 0622    Lab Status: Final result Specimen: Urine, Clean Catch Updated: 05/07/21 0629     Color, UA Yellow     Clarity, UA Clear     Specific Gravity, UA <=1 005     pH, UA 5 5     Leukocytes, UA Negative     Nitrite, UA Negative     Protein, UA Negative mg/dl      Glucose, UA Negative mg/dl      Ketones, UA 15 (1+) mg/dl      Urobilinogen, UA 0 2 E U /dl      Bilirubin, UA Negative     Blood, UA Negative    Novel Coronavirus (Covid-19),PCR [facility] - 2 Hour Stat [[id number]]  (Normal) Collected: 05/07/21 0508    Lab Status: Final result Specimen: Nares from Nose Updated: 05/07/21 1779     SARS-CoV-2 Negative    Narrative: The specimen collection materials, transport medium, and/or testing methodology utilized in the production of these test results have been proven to be reliable in a limited validation with an abbreviated program under the Emergency Utilization Authorization provided by the FDA  Testing reported as "Presumptive positive" will be confirmed with secondary testing to ensure result accuracy  Clinical caution and judgement should be used with the interpretation of these results with consideration of the clinical impression and other laboratory testing  Testing reported as "Positive" or "Negative" has been proven to be accurate according to standard laboratory validation requirements  All testing is performed with control materials showing appropriate reactivity at standard intervals  Lactic acid [[id number]]  (Normal) Collected: 05/07/21 0508    Lab Status: Final result Specimen: Blood from Arm, Left Updated: 05/07/21 0539     LACTIC ACID 0 7 mmol/L     Narrative:      Result may be elevated if tourniquet was used during collection      Troponin I [[id number]]  (Normal) Collected: 05/07/21 0508    Lab Status: Final result Specimen: Blood from Arm, Left Updated: 05/07/21 0539     Troponin I <0 02 ng/mL     Comprehensive metabolic panel [[id number]]  (Abnormal) Collected: 05/07/21 0508    Lab Status: Final result Specimen: Blood from Arm, Left Updated: 05/07/21 0534     Sodium 137 mmol/L      Potassium 3 7 mmol/L      Chloride 101 mmol/L      CO2 28 mmol/L      ANION GAP 8 mmol/L      BUN 9 mg/dL      Creatinine 0 73 mg/dL      Glucose 150 mg/dL      Calcium 9 4 mg/dL      AST 9 U/L      ALT 16 U/L      Alkaline Phosphatase 89 U/L      Total Protein 8 0 g/dL      Albumin 3 5 g/dL      Total Bilirubin 0 60 mg/dL      eGFR 98 ml/min/1 73sq m     Narrative:      [facility] guidelines for Chronic Kidney Disease (CKD):     Stage 1 with normal or high GFR (GFR > 90 mL/min/1 73 square meters)    Stage 2 Mild CKD (GFR = 60-89 mL/min/1 73 square meters)    Stage 3A Moderate CKD (GFR = 45-59 mL/min/1 73 square meters)    Stage 3B Moderate CKD (GFR = 30-44 mL/min/1 73 square meters)    Stage 4 Severe CKD (GFR = 15-29 mL/min/1 73 square meters)    Stage 5 End Stage CKD (GFR <15 mL/min/1 73 square meters)  Note: GFR calculation is accurate only with a steady state creatinine    Lipase [[id number]]  (Abnormal) Collected: 05/07/21 0508    Lab Status: Final result Specimen: Blood from Arm, Left Updated: 05/07/21 0534     Lipase 60 u/L     CBC and differential [[id number]]  (Abnormal) Collected: 05/07/21 0508    Lab Status: Final result Specimen: Blood from Arm, Left Updated: 05/07/21 0516     WBC 8 50 Thousand/uL      RBC 5 01 Million/uL      Hemoglobin 15 8 g/dL      Hematocrit 45 4 %      MCV 91 fL      MCH 31 5 pg      MCHC 34 8 g/dL      RDW 12 1 %      MPV 8 9 fL      Platelets 688 Thousands/uL      nRBC 0 /100 WBCs      Neutrophils Relative 57 %      Immat GRANS % 0 %      Lymphocytes Relative 33 %      Monocytes Relative 8 %      Eosinophils Relative 1 %      Basophils Relative 1 %      Neutrophils Absolute 4 86 Thousands/µL      Immature Grans Absolute 0 03 Thousand/uL      Lymphocytes Absolute 2 79 Thousands/µL      Monocytes Absolute 0 65 Thousand/µL      Eosinophils Absolute 0 12 Thousand/µL      Basophils Absolute 0 05 Thousands/µL                  CT abdomen pelvis with contrast   Final Result by Shannon Isidro MD (05/07 8908)      No evidence of acute abdominopelvic process  Workstation performed: [id number]         XR chest 1 view   ED Interpretation by Shira Dean DO (05/07 7530)   NAD                 Procedures  Procedures         ED Course  ED Course as of May 07 0701   Fri May 07, 2021   0517 WBC: 8 50   0528 Notes pain improved 6/10      0535 Pain and is 2/10 and defers additional analgesic   Lipase(!): 60   0552 Troponin I: <0 02   0552 LACTIC ACID: 0 7   0552 EKG 5:24 a m  Normal sinus rhythm rate 93 normal axis normal intervals no ST elevation or depression interpreted by me      0611 SARS-COV-2: Negative   0638 Ketones, UA(!): 15 (1+)   0638 Glucose, UA: Negative   0647 Surgery Gladystine Rocher will arrange further testing  Patient comfortable and agreeable close outpatient follow-up, will return immediately if worse or new symptoms                HEART Risk Score      Most Recent Value   Heart Score Risk Calculator   History  0 Filed at: 05/07/2021 0553   ECG  0 Filed at: 05/07/2021 0553   Age  1 Filed at: 05/07/2021 0553   Risk Factors  1 Filed at: 05/07/2021 0553   Troponin  0 Filed at: 05/07/2021 0553   HEART Score  2 Filed at: 05/07/2021 2436                                    MDM    Disposition  Final diagnoses:   Abdominal pain     Time reflects when diagnosis was documented in both MDM as applicable and the Disposition within this note     Time User Action Codes Description Comment    5/7/2021  5:06 AM Kera Prince Add [R10 9] Abdominal pain       ED Disposition     ED Disposition Condition Date/Time Comment    Discharge Stable Fri May 7, 2021  6:50 AM Randall Diaz discharge to home/self care              Follow-up Information     Follow up With Specialties Details Why Contact Info    Brian Temple DO General Surgery Schedule an appointment as soon as possible for a visit in 1 week  [address]            Patient's Medications   Discharge Prescriptions    MORPHINE (MSIR) 15 MG TABLET    Take 1 tablet (15 mg total) by mouth every 8 (eight) hours as needed for severe pain for up to 4 dosesMax Daily Amount: 45 mg       Start Date: 5/7/2021  End Date: --       Order Dose: 15 mg       Quantity: 4 tablet    Refills: 0    ONDANSETRON (ZOFRAN) 4 MG TABLET    Take 1 tablet (4 mg total) by mouth every 6 (six) hours as needed for nausea or vomiting       Start Date: 5/7/2021  End Date: --       Order Dose: 4 mg       Quantity: 10 tablet    Refills: 0         PDMP Review       Value Time User    PDMP Reviewed  Yes 4/27/2021  2:23 PM Yasir Velez MD          ED Provider  Electronically Signed by           Elizabeth Crow DO  05/07/21 5430

## 2021-05-07 NOTE — Clinical Note
Lis Mayer was seen and treated in our emergency department on 5/7/2021  Diagnosis:      Geri Kidney  may return to work on return date  She may return on this date: 05/10/2021          If you have any questions or concerns, please don't hesitate to call        Bren Hanley DO    ______________________________           _______________          _______________  Hospital Representative                              Date                                Time

## 2021-05-07 NOTE — ED NOTES
IV catheter discontinued intact  Site without signs and symptoms of complications  Dressing and pressure applied  Patient verbalized understanding of discharge instructions including medication administration and follow-up care  No further questions or concerns at this time          Tom Virk RN  05/07/21 0148

## 2022-10-06 ENCOUNTER — HOSPITAL ENCOUNTER (EMERGENCY)
Facility: HOSPITAL | Age: 52
Discharge: HOME | End: 2022-10-06
Attending: EMERGENCY MEDICINE
Payer: COMMERCIAL

## 2022-10-06 ENCOUNTER — APPOINTMENT (OUTPATIENT)
Dept: RADIOLOGY | Facility: HOSPITAL | Age: 52
End: 2022-10-06
Payer: COMMERCIAL

## 2022-10-06 VITALS
SYSTOLIC BLOOD PRESSURE: 179 MMHG | HEART RATE: 99 BPM | OXYGEN SATURATION: 94 % | BODY MASS INDEX: 37.84 KG/M2 | RESPIRATION RATE: 20 BRPM | WEIGHT: 213.6 LBS | DIASTOLIC BLOOD PRESSURE: 99 MMHG | TEMPERATURE: 97.3 F

## 2022-10-06 DIAGNOSIS — J40 BRONCHITIS: Primary | ICD-10-CM

## 2022-10-06 LAB
ALBUMIN SERPL BCP-MCNC: 3.8 G/DL (ref 3.5–5)
ALP SERPL-CCNC: 72 U/L (ref 46–116)
ALT SERPL W P-5'-P-CCNC: 22 U/L (ref 12–78)
ANION GAP SERPL CALCULATED.3IONS-SCNC: 9 MMOL/L (ref 4–13)
APTT PPP: 31 SECONDS (ref 23–37)
AST SERPL W P-5'-P-CCNC: 11 U/L (ref 5–45)
ATRIAL RATE: 111 BPM
BASOPHILS # BLD AUTO: 0.04 THOUSANDS/ÂΜL (ref 0–0.1)
BASOPHILS NFR BLD AUTO: 1 % (ref 0–1)
BILIRUB SERPL-MCNC: 0.52 MG/DL (ref 0.2–1)
BUN SERPL-MCNC: 8 MG/DL (ref 5–25)
CALCIUM SERPL-MCNC: 9.5 MG/DL (ref 8.3–10.1)
CARDIAC TROPONIN I PNL SERPL HS: 6 NG/L
CHLORIDE SERPL-SCNC: 104 MMOL/L (ref 96–108)
CO2 SERPL-SCNC: 26 MMOL/L (ref 21–32)
CREAT SERPL-MCNC: 0.59 MG/DL (ref 0.6–1.3)
D DIMER PPP FEU-MCNC: 0.42 UG/ML FEU
EOSINOPHIL # BLD AUTO: 0.1 THOUSAND/ÂΜL (ref 0–0.61)
EOSINOPHIL NFR BLD AUTO: 2 % (ref 0–6)
ERYTHROCYTE [DISTWIDTH] IN BLOOD BY AUTOMATED COUNT: 13.2 % (ref 11.6–15.1)
FLUAV RNA RESP QL NAA+PROBE: NEGATIVE
FLUBV RNA RESP QL NAA+PROBE: NEGATIVE
GFR SERPL CREATININE-BSD FRML MDRD: 107 ML/MIN/1.73SQ M
GLUCOSE SERPL-MCNC: 154 MG/DL (ref 65–140)
HCT VFR BLD AUTO: 49.5 % (ref 34.8–46.1)
HGB BLD-MCNC: 16.9 G/DL (ref 11.5–15.4)
IMM GRANULOCYTES # BLD AUTO: 0.03 THOUSAND/UL (ref 0–0.2)
IMM GRANULOCYTES NFR BLD AUTO: 1 % (ref 0–2)
INR PPP: 0.88 (ref 0.84–1.19)
LACTATE SERPL-SCNC: 1.1 MMOL/L (ref 0.5–2)
LIPASE SERPL-CCNC: 61 U/L (ref 73–393)
LYMPHOCYTES # BLD AUTO: 2.04 THOUSANDS/ÂΜL (ref 0.6–4.47)
LYMPHOCYTES NFR BLD AUTO: 34 % (ref 14–44)
MCH RBC QN AUTO: 31.3 PG (ref 26.8–34.3)
MCHC RBC AUTO-ENTMCNC: 34.1 G/DL (ref 31.4–37.4)
MCV RBC AUTO: 92 FL (ref 82–98)
MONOCYTES # BLD AUTO: 0.33 THOUSAND/ÂΜL (ref 0.17–1.22)
MONOCYTES NFR BLD AUTO: 6 % (ref 4–12)
NEUTROPHILS # BLD AUTO: 3.48 THOUSANDS/ÂΜL (ref 1.85–7.62)
NEUTS SEG NFR BLD AUTO: 56 % (ref 43–75)
NRBC BLD AUTO-RTO: 0 /100 WBCS
NT-PROBNP SERPL-MCNC: 285 PG/ML
P AXIS: 79 DEGREES
PLATELET # BLD AUTO: 200 THOUSANDS/UL (ref 149–390)
PMV BLD AUTO: 8.3 FL (ref 8.9–12.7)
POTASSIUM SERPL-SCNC: 3.7 MMOL/L (ref 3.5–5.3)
PR INTERVAL: 178 MS
PROT SERPL-MCNC: 8.1 G/DL (ref 6.4–8.4)
PROTHROMBIN TIME: 12.1 SECONDS (ref 11.6–14.5)
QRS AXIS: 70 DEGREES
QRSD INTERVAL: 82 MS
QT INTERVAL: 338 MS
QTC INTERVAL: 459 MS
RBC # BLD AUTO: 5.4 MILLION/UL (ref 3.81–5.12)
RSV RNA RESP QL NAA+PROBE: NEGATIVE
SARS-COV-2 RNA RESP QL NAA+PROBE: NEGATIVE
SODIUM SERPL-SCNC: 139 MMOL/L (ref 135–147)
T WAVE AXIS: 90 DEGREES
VENTRICULAR RATE: 111 BPM
WBC # BLD AUTO: 6.02 THOUSAND/UL (ref 4.31–10.16)

## 2022-10-06 PROCEDURE — 0241U HB NFCT DS VIR RESP RNA 4 TRGT: CPT | Performed by: EMERGENCY MEDICINE

## 2022-10-06 PROCEDURE — 83605 ASSAY OF LACTIC ACID: CPT | Performed by: EMERGENCY MEDICINE

## 2022-10-06 PROCEDURE — 84484 ASSAY OF TROPONIN QUANT: CPT | Performed by: EMERGENCY MEDICINE

## 2022-10-06 PROCEDURE — 85379 FIBRIN DEGRADATION QUANT: CPT | Performed by: EMERGENCY MEDICINE

## 2022-10-06 PROCEDURE — 99285 EMERGENCY DEPT VISIT HI MDM: CPT

## 2022-10-06 PROCEDURE — 83690 ASSAY OF LIPASE: CPT | Performed by: EMERGENCY MEDICINE

## 2022-10-06 PROCEDURE — 85730 THROMBOPLASTIN TIME PARTIAL: CPT | Performed by: EMERGENCY MEDICINE

## 2022-10-06 PROCEDURE — 71045 X-RAY EXAM CHEST 1 VIEW: CPT

## 2022-10-06 PROCEDURE — 83880 ASSAY OF NATRIURETIC PEPTIDE: CPT | Performed by: EMERGENCY MEDICINE

## 2022-10-06 PROCEDURE — 85025 COMPLETE CBC W/AUTO DIFF WBC: CPT | Performed by: EMERGENCY MEDICINE

## 2022-10-06 PROCEDURE — 93005 ELECTROCARDIOGRAM TRACING: CPT

## 2022-10-06 PROCEDURE — 36415 COLL VENOUS BLD VENIPUNCTURE: CPT | Performed by: EMERGENCY MEDICINE

## 2022-10-06 PROCEDURE — 99285 EMERGENCY DEPT VISIT HI MDM: CPT | Performed by: EMERGENCY MEDICINE

## 2022-10-06 PROCEDURE — 87040 BLOOD CULTURE FOR BACTERIA: CPT | Performed by: EMERGENCY MEDICINE

## 2022-10-06 PROCEDURE — 80053 COMPREHEN METABOLIC PANEL: CPT | Performed by: EMERGENCY MEDICINE

## 2022-10-06 PROCEDURE — 85610 PROTHROMBIN TIME: CPT | Performed by: EMERGENCY MEDICINE

## 2022-10-06 RX ORDER — AZITHROMYCIN 250 MG/1
500 TABLET, FILM COATED ORAL ONCE
Status: COMPLETED | OUTPATIENT
Start: 2022-10-06 | End: 2022-10-06

## 2022-10-06 RX ORDER — ALBUTEROL SULFATE 90 UG/1
2 AEROSOL, METERED RESPIRATORY (INHALATION) EVERY 6 HOURS PRN
Qty: 6.7 G | Refills: 0 | Status: SHIPPED | OUTPATIENT
Start: 2022-10-06

## 2022-10-06 RX ORDER — AZITHROMYCIN 500 MG/1
500 TABLET, FILM COATED ORAL
Qty: 25 TABLET | Refills: 0 | Status: SHIPPED | OUTPATIENT
Start: 2022-10-06 | End: 2022-10-11

## 2022-10-06 RX ADMIN — AZITHROMYCIN MONOHYDRATE 500 MG: 250 TABLET ORAL at 08:31

## 2022-10-06 NOTE — ED PROVIDER NOTES
History  Chief Complaint   Patient presents with    Shortness of Breath     Started with head cold Friday, symptoms getting worse now inclused shortness of breath, cough and achy  Thinks she has pneumonia  Has been sick for the past 4 days  Congestion, cough, headaches, bring up phlegm  Does smoke  Son sick with same  Son is COVID negative  Not vaccinated  No vomiting or diarrhea  Feels achy all over  Took 4 ibuprofen at 6:00 a m  History provided by:  Patient   used: No    Shortness of Breath  Severity:  Mild  Onset quality:  Gradual  Duration:  4 days  Timing:  Constant  Progression:  Worsening  Chronicity:  New  Context: URI    Relieved by:  NSAIDs  Worsened by:  Nothing  Ineffective treatments:  None tried  Associated symptoms: cough, fever, headaches, sore throat and sputum production    Associated symptoms: no abdominal pain, no chest pain, no ear pain, no neck pain, no rash, no vomiting and no wheezing        Prior to Admission Medications   Prescriptions Last Dose Informant Patient Reported?  Taking?   aspirin (ECOTRIN LOW STRENGTH) 81 mg EC tablet   No Yes   Sig: Take 1 tablet (81 mg total) by mouth daily With food   metFORMIN (GLUCOPHAGE) 1000 MG tablet   No Yes   Sig: Take 1 tablet (1,000 mg total) by mouth 2 (two) times a day with meals   morphine (MSIR) 15 mg tablet   No Yes   Sig: Take 1 tablet (15 mg total) by mouth every 8 (eight) hours as needed for severe pain for up to 4 dosesMax Daily Amount: 45 mg   nitroglycerin (NITROSTAT) 0 4 mg SL tablet   No Yes   Sig: Place 1 tablet (0 4 mg total) under the tongue every 5 (five) minutes as needed for chest pain   ondansetron (ZOFRAN) 4 mg tablet   No Yes   Sig: Take 1 tablet (4 mg total) by mouth every 6 (six) hours as needed for nausea or vomiting      Facility-Administered Medications: None       Past Medical History:   Diagnosis Date    [facility] mellitus [facility] 4/27/2021    Elevated TSH 2021    High cholesterol     Hypertensive urgency 2021    Morbid obesity with BMI of 40 0-44 9, adult ([facility] ) 2021    Noncompliance with treatment plan 2021    Precordial chest pain 2021    RUQ abdominal pain 2021    Type 2 diabetes mellitus with hyperglycemia, without long-term current use of insulin ([facility] ) 2/3/2021       Past Surgical History:   Procedure Laterality Date    APPENDECTOMY       SECTION      DILATION AND CURETTAGE OF UTERUS         History reviewed  No pertinent family history  I have reviewed and agree with the history as documented  E-Cigarette/Vaping    E-Cigarette Use Never User      E-Cigarette/Vaping Substances     Social History     Tobacco Use    Smoking status: Current Every Day Smoker     Packs/day: 1 00     Types: Cigarettes    Smokeless tobacco: Never Used   Vaping Use    Vaping Use: Never used   Substance Use Topics    Alcohol use: Never    Drug use: Never       Review of Systems   Constitutional: Positive for fever  Negative for chills  HENT: Positive for sore throat  Negative for ear pain, hearing loss, trouble swallowing and voice change  Eyes: Negative for pain and discharge  Respiratory: Positive for cough, sputum production and shortness of breath  Negative for wheezing  Cardiovascular: Negative for chest pain and palpitations  Gastrointestinal: Negative for abdominal pain, blood in stool, constipation, diarrhea, nausea and vomiting  Genitourinary: Negative for dysuria, flank pain, frequency and hematuria  Musculoskeletal: Negative for joint swelling, neck pain and neck stiffness  Skin: Negative for rash and wound  Neurological: Positive for headaches  Negative for dizziness, seizures, syncope and facial asymmetry  Psychiatric/Behavioral: Negative for hallucinations, self-injury and suicidal ideas  All other systems reviewed and are negative        Physical Exam  Physical Exam  Vitals and nursing note reviewed  Constitutional:       General: She is not in acute distress  Appearance: She is well-developed  HENT:      Head: Normocephalic and atraumatic  Right Ear: External ear normal       Left Ear: External ear normal    Eyes:      General: No scleral icterus  Right eye: No discharge  Left eye: No discharge  Extraocular Movements: Extraocular movements intact  Conjunctiva/sclera: Conjunctivae normal    Cardiovascular:      Rate and Rhythm: Normal rate and regular rhythm  Heart sounds: Normal heart sounds  No murmur heard  Pulmonary:      Effort: Pulmonary effort is normal       Breath sounds: Normal breath sounds  No wheezing or rales  Abdominal:      General: Bowel sounds are normal  There is no distension  Palpations: Abdomen is soft  Tenderness: There is no abdominal tenderness  There is no guarding or rebound  Musculoskeletal:         General: No deformity  Normal range of motion  Cervical back: Normal range of motion and neck supple  Skin:     General: Skin is warm and dry  Findings: No rash  Neurological:      General: No focal deficit present  Mental Status: She is alert and oriented to person, place, and time  Cranial Nerves: No cranial nerve deficit  Psychiatric:         Mood and Affect: Mood normal          Behavior: Behavior normal          Thought Content:  Thought content normal          Judgment: Judgment normal          Vital Signs  ED Triage Vitals   Temperature Pulse Respirations Blood Pressure SpO2   10/06/22 0716 10/06/22 0716 10/06/22 0716 10/06/22 0716 10/06/22 0716   (!) 97 3 °F (36 3 °C) (!) 117 16 (!) 213/130 94 %      Temp Source Heart Rate Source Patient Position - Orthostatic VS BP Location FiO2 (%)   10/06/22 0716 10/06/22 0716 10/06/22 0800 10/06/22 0800 --   Temporal Monitor Lying Right arm       Pain Score       10/06/22 0716       6           Vitals:    10/06/22 0716 10/06/22 0800 10/06/22 0815   BP: (!) 213/130 (!) 196/102 (!) 181/107   Pulse: (!) 117 101 (!) 110   Patient Position - Orthostatic VS:  Lying Lying         Visual Acuity      ED Medications  Medications   azithromycin (ZITHROMAX) tablet 500 mg (has no administration in time range)       Diagnostic Studies  Results Reviewed     Procedure Component Value Units Date/Time    FLU/RSV/COVID - if FLU/RSV clinically relevant [[id number]]  (Normal) Collected: 10/06/22 0733    Lab Status: Final result Specimen: Nares from Nasopharyngeal Swab Updated: 10/06/22 0818     SARS-CoV-2 Negative     INFLUENZA A PCR Negative     INFLUENZA B PCR Negative     RSV PCR Negative    Narrative:      FOR PEDIATRIC PATIENTS - copy/paste COVID Guidelines URL to browser: [email or web address]  [email or web address]    SARS-CoV-2 assay is a Nucleic Acid Amplification assay intended for the  qualitative detection of nucleic acid from SARS-CoV-2 in nasopharyngeal  swabs  Results are for the presumptive identification of SARS-CoV-2 RNA  Positive results are indicative of infection with SARS-CoV-2, the virus  causing COVID-19, but do not rule out bacterial infection or co-infection  with other viruses  Laboratories within the [place] and its  territories are required to report all positive results to the appropriate  public health authorities  Negative results do not preclude SARS-CoV-2  infection and should not be used as the sole basis for treatment or other  patient management decisions  Negative results must be combined with  clinical observations, patient history, and epidemiological information  This test has not been FDA cleared or approved  This test has been authorized by FDA under an Emergency Use Authorization  (EUA)   This test is only authorized for the duration of time the  declaration that circumstances exist justifying the authorization of the  emergency use of an in vitro diagnostic tests for detection of SARS-CoV-2  virus and/or diagnosis of COVID-19 infection under section 564(b)(1) of  the Act, 21 U  S C  672BOF-6(N)(3), unless the authorization is terminated  or revoked sooner  The test has been validated but independent review by FDA  and CLIA is pending  Test performed using [employer] GeneXpert: This RT-PCR assay targets N2,  a region unique to SARS-CoV-2  A conserved region in the E-gene was chosen  for pan-Sarbecovirus detection which includes SARS-CoV-2  According to CMS-2020-01-R, this platform meets the definition of high-throughput technology      Comprehensive metabolic panel [[id number]]  (Abnormal) Collected: 10/06/22 0733    Lab Status: Final result Specimen: Blood from Arm, Right Updated: 10/06/22 0813     Sodium 139 mmol/L      Potassium 3 7 mmol/L      Chloride 104 mmol/L      CO2 26 mmol/L      ANION GAP 9 mmol/L      BUN 8 mg/dL      Creatinine 0 59 mg/dL      Glucose 154 mg/dL      Calcium 9 5 mg/dL      AST 11 U/L      ALT 22 U/L      Alkaline Phosphatase 72 U/L      Total Protein 8 1 g/dL      Albumin 3 8 g/dL      Total Bilirubin 0 52 mg/dL      eGFR 107 ml/min/1 73sq m     Narrative:      [place] guidelines for Chronic Kidney Disease (CKD):     Stage 1 with normal or high GFR (GFR > 90 mL/min/1 73 square meters)    Stage 2 Mild CKD (GFR = 60-89 mL/min/1 73 square meters)    Stage 3A Moderate CKD (GFR = 45-59 mL/min/1 73 square meters)    Stage 3B Moderate CKD (GFR = 30-44 mL/min/1 73 square meters)    Stage 4 Severe CKD (GFR = 15-29 mL/min/1 73 square meters)    Stage 5 End Stage CKD (GFR <15 mL/min/1 73 square meters)  Note: GFR calculation is accurate only with a steady state creatinine    Lipase [[id number]]  (Abnormal) Collected: 10/06/22 0733    Lab Status: Final result Specimen: Blood from Arm, Right Updated: 10/06/22 0813     Lipase 61 u/L     NT-BNP PRO [[id number]]  (Abnormal) Collected: 10/06/22 0733    Lab Status: Final result Specimen: Blood from Arm, Right Updated: 10/06/22 0813     NT-proBNP 285 pg/mL     HS Troponin 0hr (reflex protocol) [[id number]]  (Normal) Collected: 10/06/22 0733    Lab Status: Final result Specimen: Blood from Arm, Right Updated: 10/06/22 0805     hs TnI 0hr 6 ng/L     D-Dimer [[id number]]  (Normal) Collected: 10/06/22 0733    Lab Status: Final result Specimen: Blood from Arm, Right Updated: 10/06/22 0804     D-Dimer, Quant 0 42 ug/ml FEU     Narrative: In the evaluation for possible pulmonary embolism, in the appropriate (Well's Score of 4 or less) patient, the age adjusted d-dimer cutoff for this patient can be calculated as:    Age x 0 01 (in ug/mL) for Age-adjusted D-dimer exclusion threshold for a patient over 50 years  Lactic acid [[id number]]  (Normal) Collected: 10/06/22 0733    Lab Status: Final result Specimen: Blood from Arm, Right Updated: 10/06/22 0801     LACTIC ACID 1 1 mmol/L     Narrative:      Result may be elevated if tourniquet was used during collection      Protime-INR [[id number]]  (Normal) Collected: 10/06/22 0733    Lab Status: Final result Specimen: Blood from Arm, Right Updated: 10/06/22 0800     Protime 12 1 seconds      INR 0 88    APTT [[id number]]  (Normal) Collected: 10/06/22 0733    Lab Status: Final result Specimen: Blood from Arm, Right Updated: 10/06/22 0800     PTT 31 seconds     CBC and differential [[id number]]  (Abnormal) Collected: 10/06/22 0733    Lab Status: Final result Specimen: Blood from Arm, Right Updated: 10/06/22 0740     WBC 6 02 Thousand/uL      RBC 5 40 Million/uL      Hemoglobin 16 9 g/dL      Hematocrit 49 5 %      MCV 92 fL      MCH 31 3 pg      MCHC 34 1 g/dL      RDW 13 2 %      MPV 8 3 fL      Platelets 595 Thousands/uL      nRBC 0 /100 WBCs      Neutrophils Relative 56 %      Immat GRANS % 1 %      Lymphocytes Relative 34 %      Monocytes Relative 6 %      Eosinophils Relative 2 %      Basophils Relative 1 %      Neutrophils Absolute 3 48 Thousands/µL      Immature Grans Absolute 0 03 Thousand/uL      Lymphocytes Absolute 2 04 Thousands/µL      Monocytes Absolute 0 33 Thousand/µL      Eosinophils Absolute 0 10 Thousand/µL      Basophils Absolute 0 04 Thousands/µL     Blood culture #2 [[id number]] Collected: 10/06/22 0733    Lab Status: In process Specimen: Blood from Arm, Left Updated: 10/06/22 0738    Blood culture #1 [[id number]] Collected: 10/06/22 0733    Lab Status: In process Specimen: Blood from Arm, Right Updated: 10/06/22 0738                 XR chest 1 view portable   ED Interpretation by Kalina Nina MD (98/01 [id number])   NAD      Final Result by Carmelita Dejesus MD (10/06 3443)      No acute cardiopulmonary disease  Workstation performed: [id number]                    Procedures  ECG 12 Lead Documentation Only    Date/Time: 10/6/2022 7:28 AM  Performed by: Kalina Nina MD  Authorized by: Kalina Nina MD     ECG reviewed by me, the ED Provider: yes    Patient location:  ED  Rate:     ECG rate:  110  Rhythm:     Rhythm: sinus rhythm    Ectopy:     Ectopy: none    QRS:     QRS axis:  Normal             ED Course                               SBIRT 20yo+    Flowsheet Row Most Recent Value   SBIRT (25 yo +)    In order to provide better care to our patients, we are screening all of our patients for alcohol and drug use  Would it be okay to ask you these screening questions? Yes Filed at: 10/06/2022 4582   Initial Alcohol Screen: US AUDIT-C     1  How often do you have a drink containing alcohol? 0 Filed at: 10/06/2022 0738   2  How many drinks containing alcohol do you have on a typical day you are drinking? 0 Filed at: 10/06/2022 0738   3a  Male UNDER 65: How often do you have five or more drinks on one occasion? 0 Filed at: 10/06/2022 0738   3b  FEMALE Any Age, or MALE 65+: How often do you have 4 or more drinks on one occassion?  0 Filed at: 10/06/2022 0738   Audit-C Score 0 Filed at: 10/06/2022 1064   MORGAN: How many times in the past year have you    Used an illegal drug or used a prescription medication for non-medical reasons? Never Filed at: 10/06/2022 4962                    [facility]  Number of Diagnoses or Management Options     Amount and/or Complexity of Data Reviewed  Clinical lab tests: reviewed  Review and summarize past medical records: yes        Disposition  Final diagnoses:   Bronchitis     Time reflects when diagnosis was documented in both MDM as applicable and the Disposition within this note     Time User Action Codes Description Comment    10/6/2022  8:20 AM Pavel Lucio Add [J40] Bronchitis       ED Disposition     ED Disposition   Discharge    Condition   Stable    Date/Time   Thu Oct 6, 2022  8:20 AM    Comment   Chi Tripp discharge to home/self care  Follow-up Information    None         Patient's Medications   Discharge Prescriptions    AZITHROMYCIN (ZITHROMAX) 500 MG TABLET    Take 1 tablet (500 mg total) by mouth 5 (five) times a day for 5 days Take 2 tablets today then 1 tablet daily x 4 days       Start Date: 10/6/2022 End Date: 10/11/2022       Order Dose: 500 mg       Quantity: 25 tablet    Refills: 0       No discharge procedures on file      PDMP Review       Value Time User    PDMP Reviewed  Yes 4/27/2021  2:23 PM Gino Ormond, MD          ED Provider  Electronically Signed by           Lazarus Christine, MD  10/06/22 5421

## 2022-10-06 NOTE — Clinical Note
Bhavik Paris was seen and treated in our emergency department on 10/6/2022  Diagnosis:     Ernst Virk  may return to work on return date  She may return on this date: 10/08/2022         If you have any questions or concerns, please don't hesitate to call        Soledad Goode MD    ______________________________           _______________          _______________  Hospital Representative                              Date                                Time

## 2022-10-09 LAB
BACTERIA BLD CULT: NORMAL
BACTERIA BLD CULT: NORMAL

## 2022-10-11 LAB
BACTERIA BLD CULT: NORMAL
BACTERIA BLD CULT: NORMAL

## 2022-10-12 PROBLEM — R50.9 FEVER: Status: RESOLVED | Noted: 2021-02-07 | Resolved: 2022-10-12

## 2025-07-19 ENCOUNTER — OCCMED (OUTPATIENT)
Dept: URGENT CARE | Facility: CLINIC | Age: 55
End: 2025-07-19
Payer: OTHER MISCELLANEOUS

## 2025-07-19 ENCOUNTER — APPOINTMENT (OUTPATIENT)
Dept: RADIOLOGY | Facility: CLINIC | Age: 55
End: 2025-07-19
Attending: PHYSICIAN ASSISTANT
Payer: OTHER MISCELLANEOUS

## 2025-07-19 DIAGNOSIS — M25.511 ACUTE PAIN OF RIGHT SHOULDER: ICD-10-CM

## 2025-07-19 DIAGNOSIS — M25.511 ACUTE PAIN OF RIGHT SHOULDER: Primary | ICD-10-CM

## 2025-07-19 PROCEDURE — 99283 EMERGENCY DEPT VISIT LOW MDM: CPT | Performed by: PHYSICIAN ASSISTANT

## 2025-07-19 PROCEDURE — G0382 LEV 3 HOSP TYPE B ED VISIT: HCPCS | Performed by: PHYSICIAN ASSISTANT

## 2025-07-19 PROCEDURE — 96372 THER/PROPH/DIAG INJ SC/IM: CPT

## 2025-07-19 PROCEDURE — 73030 X-RAY EXAM OF SHOULDER: CPT

## 2025-07-22 ENCOUNTER — OCCMED (OUTPATIENT)
Dept: URGENT CARE | Facility: CLINIC | Age: 55
End: 2025-07-22
Payer: OTHER MISCELLANEOUS

## 2025-07-22 DIAGNOSIS — M25.511 ACUTE PAIN OF RIGHT SHOULDER: Primary | ICD-10-CM

## 2025-07-22 PROCEDURE — 99213 OFFICE O/P EST LOW 20 MIN: CPT

## 2025-07-30 ENCOUNTER — OCCMED (OUTPATIENT)
Dept: URGENT CARE | Facility: CLINIC | Age: 55
End: 2025-07-30
Payer: OTHER MISCELLANEOUS

## 2025-07-30 DIAGNOSIS — M25.511 ACUTE PAIN OF RIGHT SHOULDER: Primary | ICD-10-CM

## 2025-07-30 PROCEDURE — 99213 OFFICE O/P EST LOW 20 MIN: CPT

## 2025-08-06 ENCOUNTER — APPOINTMENT (OUTPATIENT)
Dept: URGENT CARE | Facility: CLINIC | Age: 55
End: 2025-08-06
Payer: OTHER MISCELLANEOUS